# Patient Record
Sex: MALE | Race: WHITE | Employment: OTHER | URBAN - METROPOLITAN AREA
[De-identification: names, ages, dates, MRNs, and addresses within clinical notes are randomized per-mention and may not be internally consistent; named-entity substitution may affect disease eponyms.]

---

## 2017-03-06 ENCOUNTER — OFFICE VISIT (OUTPATIENT)
Dept: ORTHOPEDIC SURGERY | Age: 82
End: 2017-03-06

## 2017-03-06 DIAGNOSIS — M17.0 PRIMARY OSTEOARTHRITIS OF BOTH KNEES: Primary | ICD-10-CM

## 2017-03-06 PROCEDURE — 20611 DRAIN/INJ JOINT/BURSA W/US: CPT | Performed by: PHYSICIAN ASSISTANT

## 2017-05-09 PROBLEM — M00.062 STAPHYLOCOCCAL ARTHRITIS OF LEFT KNEE (HCC): Status: ACTIVE | Noted: 2017-05-09

## 2017-05-16 ENCOUNTER — TELEPHONE (OUTPATIENT)
Dept: ORTHOPEDIC SURGERY | Age: 82
End: 2017-05-16

## 2017-05-22 PROBLEM — M00.09 STAPHYLOCOCCAL ARTHRITIS OF MULTIPLE SITES (HCC): Status: ACTIVE | Noted: 2017-05-09

## 2017-05-22 PROBLEM — I80.8 THROMBOPHLEBITIS ARM: Status: ACTIVE | Noted: 2017-05-22

## 2017-05-31 ENCOUNTER — OFFICE VISIT (OUTPATIENT)
Dept: ORTHOPEDIC SURGERY | Age: 82
End: 2017-05-31

## 2017-05-31 DIAGNOSIS — M00.09 STAPHYLOCOCCAL ARTHRITIS OF MULTIPLE SITES (HCC): Primary | ICD-10-CM

## 2017-05-31 PROCEDURE — 99024 POSTOP FOLLOW-UP VISIT: CPT | Performed by: PHYSICIAN ASSISTANT

## 2017-09-08 ENCOUNTER — OFFICE VISIT (OUTPATIENT)
Dept: ORTHOPEDIC SURGERY | Age: 82
End: 2017-09-08

## 2017-09-08 DIAGNOSIS — M17.0 PRIMARY OSTEOARTHRITIS OF BOTH KNEES: Primary | ICD-10-CM

## 2017-09-08 PROCEDURE — 99213 OFFICE O/P EST LOW 20 MIN: CPT | Performed by: PHYSICIAN ASSISTANT

## 2019-01-08 ENCOUNTER — HOSPITAL ENCOUNTER (OUTPATIENT)
Dept: OPERATING ROOM | Age: 84
Setting detail: OUTPATIENT SURGERY
Discharge: HOME | End: 2019-01-08
Payer: MEDICARE

## 2019-01-08 VITALS — HEART RATE: 91 BPM | SYSTOLIC BLOOD PRESSURE: 177 MMHG | DIASTOLIC BLOOD PRESSURE: 73 MMHG

## 2019-01-08 PROCEDURE — 66821 AFTER CATARACT LASER SURGERY: CPT

## 2019-01-08 PROCEDURE — 6370000000 HC RX 637 (ALT 250 FOR IP): Performed by: OPHTHALMOLOGY

## 2019-01-08 RX ORDER — TROPICAMIDE 10 MG/ML
1 SOLUTION/ DROPS OPHTHALMIC
Status: COMPLETED | OUTPATIENT
Start: 2019-01-08 | End: 2019-01-08

## 2019-01-08 RX ORDER — PROPARACAINE HYDROCHLORIDE 5 MG/ML
1 SOLUTION/ DROPS OPHTHALMIC
Status: ACTIVE | OUTPATIENT
Start: 2019-01-08 | End: 2019-01-08

## 2019-01-08 RX ADMIN — TROPICAMIDE 1 DROP: 10 SOLUTION/ DROPS OPHTHALMIC at 07:01

## 2019-01-29 ENCOUNTER — HOSPITAL ENCOUNTER (OUTPATIENT)
Dept: OPERATING ROOM | Age: 84
Setting detail: OUTPATIENT SURGERY
Discharge: HOME | End: 2019-01-29
Payer: MEDICARE

## 2019-01-29 VITALS
SYSTOLIC BLOOD PRESSURE: 184 MMHG | HEART RATE: 53 BPM | OXYGEN SATURATION: 95 % | DIASTOLIC BLOOD PRESSURE: 71 MMHG | RESPIRATION RATE: 18 BRPM

## 2019-01-29 PROCEDURE — 66821 AFTER CATARACT LASER SURGERY: CPT

## 2019-01-29 PROCEDURE — 6370000000 HC RX 637 (ALT 250 FOR IP): Performed by: OPHTHALMOLOGY

## 2019-01-29 RX ORDER — PROPARACAINE HYDROCHLORIDE 5 MG/ML
1 SOLUTION/ DROPS OPHTHALMIC
Status: DISCONTINUED | OUTPATIENT
Start: 2019-01-29 | End: 2019-01-29

## 2019-01-29 RX ORDER — TROPICAMIDE 10 MG/ML
1 SOLUTION/ DROPS OPHTHALMIC
Status: DISCONTINUED | OUTPATIENT
Start: 2019-01-29 | End: 2019-01-29

## 2019-01-29 RX ADMIN — TROPICAMIDE 1 DROP: 10 SOLUTION/ DROPS OPHTHALMIC at 07:01

## 2021-03-22 ENCOUNTER — HOSPITAL ENCOUNTER (INPATIENT)
Age: 86
LOS: 2 days | Discharge: HOME | DRG: 391 | End: 2021-03-24
Attending: EMERGENCY MEDICINE | Admitting: INTERNAL MEDICINE
Payer: MEDICARE

## 2021-03-22 ENCOUNTER — APPOINTMENT (OUTPATIENT)
Dept: GENERAL RADIOLOGY | Age: 86
DRG: 391 | End: 2021-03-22
Payer: MEDICARE

## 2021-03-22 DIAGNOSIS — I49.3 PVC'S (PREMATURE VENTRICULAR CONTRACTIONS): ICD-10-CM

## 2021-03-22 DIAGNOSIS — R10.13 ABDOMINAL PAIN, EPIGASTRIC: ICD-10-CM

## 2021-03-22 DIAGNOSIS — R00.2 PALPITATIONS: Primary | ICD-10-CM

## 2021-03-22 LAB
A/G RATIO: 1.5 (ref 1.1–2.2)
ALBUMIN SERPL-MCNC: 4.1 G/DL (ref 3.4–5)
ALP BLD-CCNC: 52 U/L (ref 40–129)
ALT SERPL-CCNC: 29 U/L (ref 10–40)
ANION GAP SERPL CALCULATED.3IONS-SCNC: 12 MMOL/L (ref 3–16)
AST SERPL-CCNC: 33 U/L (ref 15–37)
BASOPHILS ABSOLUTE: 0 K/UL (ref 0–0.2)
BASOPHILS RELATIVE PERCENT: 0.6 %
BILIRUB SERPL-MCNC: 0.4 MG/DL (ref 0–1)
BUN BLDV-MCNC: 22 MG/DL (ref 7–20)
CALCIUM SERPL-MCNC: 9.4 MG/DL (ref 8.3–10.6)
CHLORIDE BLD-SCNC: 93 MMOL/L (ref 99–110)
CO2: 32 MMOL/L (ref 21–32)
CREAT SERPL-MCNC: 0.8 MG/DL (ref 0.8–1.3)
EKG ATRIAL RATE: 86 BPM
EKG DIAGNOSIS: NORMAL
EKG P AXIS: 23 DEGREES
EKG P-R INTERVAL: 172 MS
EKG Q-T INTERVAL: 418 MS
EKG QRS DURATION: 154 MS
EKG QTC CALCULATION (BAZETT): 500 MS
EKG R AXIS: -64 DEGREES
EKG T AXIS: 36 DEGREES
EKG VENTRICULAR RATE: 86 BPM
EOSINOPHILS ABSOLUTE: 0 K/UL (ref 0–0.6)
EOSINOPHILS RELATIVE PERCENT: 0.1 %
GFR AFRICAN AMERICAN: >60
GFR NON-AFRICAN AMERICAN: >60
GLOBULIN: 2.7 G/DL
GLUCOSE BLD-MCNC: 133 MG/DL (ref 70–99)
HCT VFR BLD CALC: 40 % (ref 40.5–52.5)
HEMOGLOBIN: 14.1 G/DL (ref 13.5–17.5)
LYMPHOCYTES ABSOLUTE: 0.6 K/UL (ref 1–5.1)
LYMPHOCYTES RELATIVE PERCENT: 9.6 %
MAGNESIUM: 1.9 MG/DL (ref 1.8–2.4)
MCH RBC QN AUTO: 32.8 PG (ref 26–34)
MCHC RBC AUTO-ENTMCNC: 35.4 G/DL (ref 31–36)
MCV RBC AUTO: 92.6 FL (ref 80–100)
MONOCYTES ABSOLUTE: 0.4 K/UL (ref 0–1.3)
MONOCYTES RELATIVE PERCENT: 6.1 %
NEUTROPHILS ABSOLUTE: 5.5 K/UL (ref 1.7–7.7)
NEUTROPHILS RELATIVE PERCENT: 83.6 %
PDW BLD-RTO: 14.2 % (ref 12.4–15.4)
PLATELET # BLD: 283 K/UL (ref 135–450)
PMV BLD AUTO: 7.3 FL (ref 5–10.5)
POTASSIUM SERPL-SCNC: 3.5 MMOL/L (ref 3.5–5.1)
RBC # BLD: 4.32 M/UL (ref 4.2–5.9)
SODIUM BLD-SCNC: 137 MMOL/L (ref 136–145)
TOTAL PROTEIN: 6.8 G/DL (ref 6.4–8.2)
TROPONIN: <0.01 NG/ML
WBC # BLD: 6.6 K/UL (ref 4–11)

## 2021-03-22 PROCEDURE — 84484 ASSAY OF TROPONIN QUANT: CPT

## 2021-03-22 PROCEDURE — 93005 ELECTROCARDIOGRAM TRACING: CPT | Performed by: EMERGENCY MEDICINE

## 2021-03-22 PROCEDURE — 80053 COMPREHEN METABOLIC PANEL: CPT

## 2021-03-22 PROCEDURE — 93010 ELECTROCARDIOGRAM REPORT: CPT | Performed by: INTERNAL MEDICINE

## 2021-03-22 PROCEDURE — 2060000000 HC ICU INTERMEDIATE R&B

## 2021-03-22 PROCEDURE — 96374 THER/PROPH/DIAG INJ IV PUSH: CPT

## 2021-03-22 PROCEDURE — 2500000003 HC RX 250 WO HCPCS: Performed by: EMERGENCY MEDICINE

## 2021-03-22 PROCEDURE — 83735 ASSAY OF MAGNESIUM: CPT

## 2021-03-22 PROCEDURE — 85025 COMPLETE CBC W/AUTO DIFF WBC: CPT

## 2021-03-22 PROCEDURE — 71045 X-RAY EXAM CHEST 1 VIEW: CPT

## 2021-03-22 PROCEDURE — 99285 EMERGENCY DEPT VISIT HI MDM: CPT

## 2021-03-22 RX ORDER — ASPIRIN 81 MG/1
81 TABLET, CHEWABLE ORAL DAILY
Status: DISCONTINUED | OUTPATIENT
Start: 2021-03-23 | End: 2021-03-24 | Stop reason: HOSPADM

## 2021-03-22 RX ORDER — ATORVASTATIN CALCIUM 10 MG/1
10 TABLET, FILM COATED ORAL DAILY
Status: DISCONTINUED | OUTPATIENT
Start: 2021-03-23 | End: 2021-03-24 | Stop reason: HOSPADM

## 2021-03-22 RX ORDER — ALBUTEROL SULFATE 90 UG/1
2 AEROSOL, METERED RESPIRATORY (INHALATION) EVERY 4 HOURS PRN
COMMUNITY
Start: 2021-03-18

## 2021-03-22 RX ORDER — NAPROXEN 500 MG/1
TABLET ORAL
Status: ON HOLD | COMMUNITY
Start: 2020-12-31 | End: 2021-04-06 | Stop reason: HOSPADM

## 2021-03-22 RX ORDER — PANTOPRAZOLE SODIUM 40 MG/1
40 TABLET, DELAYED RELEASE ORAL
Status: DISCONTINUED | OUTPATIENT
Start: 2021-03-23 | End: 2021-03-24 | Stop reason: HOSPADM

## 2021-03-22 RX ORDER — TAMSULOSIN HYDROCHLORIDE 0.4 MG/1
0.4 CAPSULE ORAL DAILY
Status: DISCONTINUED | OUTPATIENT
Start: 2021-03-23 | End: 2021-03-24 | Stop reason: HOSPADM

## 2021-03-22 RX ORDER — CEFUROXIME AXETIL 250 MG/1
TABLET ORAL
Status: ON HOLD | COMMUNITY
Start: 2021-03-18 | End: 2021-04-01 | Stop reason: HOSPADM

## 2021-03-22 RX ORDER — ESCITALOPRAM OXALATE 10 MG/1
5 TABLET ORAL DAILY
Status: DISCONTINUED | OUTPATIENT
Start: 2021-03-23 | End: 2021-03-23

## 2021-03-22 RX ORDER — POLYETHYLENE GLYCOL 3350 17 G/17G
17 POWDER, FOR SOLUTION ORAL DAILY PRN
Status: DISCONTINUED | OUTPATIENT
Start: 2021-03-22 | End: 2021-03-24 | Stop reason: HOSPADM

## 2021-03-22 RX ORDER — SUCRALFATE 1 G/1
1 TABLET ORAL
Status: DISCONTINUED | OUTPATIENT
Start: 2021-03-23 | End: 2021-03-24 | Stop reason: HOSPADM

## 2021-03-22 RX ORDER — ACETAMINOPHEN 650 MG/1
650 SUPPOSITORY RECTAL EVERY 6 HOURS PRN
Status: DISCONTINUED | OUTPATIENT
Start: 2021-03-22 | End: 2021-03-24 | Stop reason: HOSPADM

## 2021-03-22 RX ORDER — PROMETHAZINE HYDROCHLORIDE 25 MG/1
12.5 TABLET ORAL EVERY 6 HOURS PRN
Status: DISCONTINUED | OUTPATIENT
Start: 2021-03-22 | End: 2021-03-24 | Stop reason: HOSPADM

## 2021-03-22 RX ORDER — COLCHICINE 0.6 MG/1
TABLET ORAL
COMMUNITY
Start: 2021-02-03

## 2021-03-22 RX ORDER — PREDNISONE 10 MG/1
TABLET ORAL
COMMUNITY
Start: 2021-02-15

## 2021-03-22 RX ORDER — ONDANSETRON 2 MG/ML
4 INJECTION INTRAMUSCULAR; INTRAVENOUS EVERY 6 HOURS PRN
Status: DISCONTINUED | OUTPATIENT
Start: 2021-03-22 | End: 2021-03-22

## 2021-03-22 RX ORDER — NITROGLYCERIN 0.4 MG/1
0.4 TABLET SUBLINGUAL EVERY 5 MIN PRN
Status: DISCONTINUED | OUTPATIENT
Start: 2021-03-22 | End: 2021-03-24 | Stop reason: HOSPADM

## 2021-03-22 RX ORDER — ACETAMINOPHEN 325 MG/1
650 TABLET ORAL EVERY 6 HOURS PRN
Status: DISCONTINUED | OUTPATIENT
Start: 2021-03-22 | End: 2021-03-24 | Stop reason: HOSPADM

## 2021-03-22 RX ORDER — HYDROCHLOROTHIAZIDE 25 MG/1
25 TABLET ORAL DAILY
Status: ON HOLD | COMMUNITY
Start: 2021-01-22 | End: 2021-04-01 | Stop reason: HOSPADM

## 2021-03-22 RX ORDER — ASPIRIN 81 MG/1
81 TABLET ORAL DAILY
Status: DISCONTINUED | OUTPATIENT
Start: 2021-03-23 | End: 2021-03-22 | Stop reason: SDUPTHER

## 2021-03-22 RX ORDER — SODIUM CHLORIDE 0.9 % (FLUSH) 0.9 %
10 SYRINGE (ML) INJECTION EVERY 12 HOURS SCHEDULED
Status: DISCONTINUED | OUTPATIENT
Start: 2021-03-23 | End: 2021-03-24 | Stop reason: HOSPADM

## 2021-03-22 RX ORDER — SODIUM CHLORIDE 0.9 % (FLUSH) 0.9 %
10 SYRINGE (ML) INJECTION PRN
Status: DISCONTINUED | OUTPATIENT
Start: 2021-03-22 | End: 2021-03-24 | Stop reason: HOSPADM

## 2021-03-22 RX ORDER — CEFUROXIME AXETIL 250 MG/1
250 TABLET ORAL EVERY 12 HOURS SCHEDULED
Status: DISCONTINUED | OUTPATIENT
Start: 2021-03-23 | End: 2021-03-24 | Stop reason: HOSPADM

## 2021-03-22 RX ORDER — METOPROLOL TARTRATE 5 MG/5ML
2.5 INJECTION INTRAVENOUS ONCE
Status: COMPLETED | OUTPATIENT
Start: 2021-03-22 | End: 2021-03-22

## 2021-03-22 RX ORDER — TIMOLOL MALEATE 5 MG/ML
1 SOLUTION/ DROPS OPHTHALMIC 2 TIMES DAILY
Status: DISCONTINUED | OUTPATIENT
Start: 2021-03-23 | End: 2021-03-24 | Stop reason: HOSPADM

## 2021-03-22 RX ORDER — UMECLIDINIUM BROMIDE AND VILANTEROL TRIFENATATE 62.5; 25 UG/1; UG/1
1 POWDER RESPIRATORY (INHALATION) DAILY
COMMUNITY
Start: 2021-03-18

## 2021-03-22 RX ORDER — ACETAMINOPHEN 325 MG/1
650 TABLET ORAL EVERY 6 HOURS PRN
COMMUNITY

## 2021-03-22 RX ADMIN — METOPROLOL TARTRATE 2.5 MG: 5 INJECTION INTRAVENOUS at 16:53

## 2021-03-22 NOTE — H&P
Hospital Medicine History & Physical      PCP: Giovanna Dee MD    Date of Admission: 3/22/2021    Date of Service: Pt seen/examined on 3/22/2021 and Admitted to Inpatient with expected LOS greater than two midnights due to medical therapy. Chief Complaint: Palpitation and chest discomfort      History Of Present Illness:  )    80 y.o. male who presented to [facility] with above complaint. He was seen by primary care doctor 4 days ago for similar complaints. There was a suspicion of pneumonia and he was started on Ceftin. Still he has been taking this medication. He does have some cough no fever sputum change in mental status nausea vomiting diarrhea or any focal neurological symptoms. He gets a feeling of palpitation or fluttering on and off since last Thursday. This is not associated with dizziness or syncope. But he always has some chest discomfort and shortness of breath during these episodes. He denies any new medications recently    Past Medical History:          Diagnosis Date    Cancer [facility])     prostate  HAD SEED IMPLANTS    GERD (gastroesophageal reflux disease)     Glaucoma     History of stomach ulcers 1993+/-    Hyperlipidemia     Hypertension     Nausea alone     Pneumonia     1944    Psychiatric problem     anxiety    S/P radiation therapy 2007    Prostate seeds    Seasonal allergies     Staphylococcal arthritis of left knee ([facility] [address] 5/9/2017       Past Surgical History:          Procedure Laterality Date    APPENDECTOMY      CATARACT REMOVAL WITH IMPLANT Right 12/01/2016    CATARACT REMOVAL WITH IMPLANT Left 12/13/2016    CHOLECYSTECTOMY      COLONOSCOPY  10/20/1999    polyp    COLONOSCOPY  01/16/2002    ?     ENDOSCOPY, COLON, DIAGNOSTIC      EYE SURGERY      cataract implants bilat eyes    KNEE SURGERY Right 05/12/2017    video arthroscopy and I&D right knee, partial medial and lateral meniscectomy right knee    OTHER SURGICAL HISTORY  7/27/2012 Historical Provider, MD   predniSONE (DELTASONE) 10 MG tablet  2/15/21   Historical Provider, MD   cefUROXime (CEFTIN) 250 MG tablet  3/18/21   Historical Provider, MD   ketoconazole (NIZORAL) 2 % cream Apply topically daily. 5/23/17   Lani Rico MD   apixaban (ELIQUIS) 5 MG TABS tablet Take 5 mg by mouth 2 times daily    Historical Provider, MD   sodium chloride (OCEAN, BABY AYR) 0.65 % nasal spray 2 sprays by Nasal route as needed for Congestion (every  2 hours prn)    Historical Provider, MD   cetirizine (ZYRTEC) 10 MG tablet Take 1 tablet by mouth daily 5/16/17   Iman Brooks MD   sucralfate (CARAFATE) 1 GM tablet Take 1 tablet by mouth 4 times daily (before meals and nightly). 4/20/13   Claribel Wang MD   promethazine (PHENERGAN) 25 MG tablet Take 0.5-1 tablets by mouth every 6 hours as needed for Nausea. 4/20/13   Claribel Wang MD   escitalopram (LEXAPRO) 5 MG tablet Take 5 mg by mouth daily. Historical Provider, MD   Azelastine HCl (ASTEPRO) 0.15 % SOLN by Nasal route as needed. Historical Provider, MD       Allergies:  Neomycin-polymyxin-gramicidin and Neosporin [bacitracin-neomycin-polymyxin]    Social History:      The patient currently lives at home pretty independent    TOBACCO:   reports that he has been smoking cigars. He has smoked for the past 38.00 years. He has never used smokeless tobacco.  ETOH:   reports no history of alcohol use. E-Cigarettes/Vaping Use     Questions Responses    E-Cigarette/Vaping Use     Start Date     Passive Exposure     Quit Date     Counseling Given     Comments             Family History:       Reviewed in detail and negative for DM, CAD, Cancer, CVA. Positive as follows:        Problem Relation Age of Onset   Mirtaen Daunt Cancer Daughter         ovarian    Stroke Mother     High Blood Pressure Father     Other Father         circulation issues       REVIEW OF SYSTEMS:   Pertinent positives as noted in the HPI.  All other systems reviewed and Result   Stable chronic changes with no acute abnormality seen             ASSESSMENT:    Active Hospital Problems    Diagnosis Date Noted    Palpitation [R00.2] 03/22/2021         PLAN:    Palpitation/chest discomfort and shortness of breath-episodic-admit, telemetry, troponin, echocardiogram, cardiology evaluation    Hypertension- home medication ,not quite sure about the diuretics that he is medication list says he is on hydrochlorothiazide and Lasix-hold and verify    Hyperlipidemia-statin    On Eliquis? Indication    GERD-Protonix and Carafate    Possible bronchitis or pneumonia-started on Ceftin as an outpatient, continue 3 more days and stop    Cardiac murmur-echocardiogram pending        DVT Prophylaxis: On Eliquis  Diet: Cardiac  Code Status: Full code    PT/OT Eval Status:     Dispo -admitted to Yisel Wilkerson MD    Thank you Kuldip Foreman MD for the opportunity to be involved in this patient's care. If you have any questions or concerns please feel free to contact me at [phone].

## 2021-03-23 LAB
CHOLESTEROL, TOTAL: 161 MG/DL (ref 0–199)
HCT VFR BLD CALC: 38.5 % (ref 40.5–52.5)
HDLC SERPL-MCNC: 64 MG/DL (ref 40–60)
HEMOGLOBIN: 13.5 G/DL (ref 13.5–17.5)
LDL CHOLESTEROL CALCULATED: 79 MG/DL
MCH RBC QN AUTO: 32.8 PG (ref 26–34)
MCHC RBC AUTO-ENTMCNC: 35 G/DL (ref 31–36)
MCV RBC AUTO: 93.7 FL (ref 80–100)
PDW BLD-RTO: 13.9 % (ref 12.4–15.4)
PLATELET # BLD: 231 K/UL (ref 135–450)
PMV BLD AUTO: 7.1 FL (ref 5–10.5)
RBC # BLD: 4.11 M/UL (ref 4.2–5.9)
TRIGL SERPL-MCNC: 89 MG/DL (ref 0–150)
TROPONIN: <0.01 NG/ML
TSH REFLEX FT4: 0.95 UIU/ML (ref 0.27–4.2)
VLDLC SERPL CALC-MCNC: 18 MG/DL
WBC # BLD: 7.9 K/UL (ref 4–11)

## 2021-03-23 PROCEDURE — 6370000000 HC RX 637 (ALT 250 FOR IP): Performed by: INTERNAL MEDICINE

## 2021-03-23 PROCEDURE — 84484 ASSAY OF TROPONIN QUANT: CPT

## 2021-03-23 PROCEDURE — 94640 AIRWAY INHALATION TREATMENT: CPT

## 2021-03-23 PROCEDURE — 6370000000 HC RX 637 (ALT 250 FOR IP)

## 2021-03-23 PROCEDURE — 85027 COMPLETE CBC AUTOMATED: CPT

## 2021-03-23 PROCEDURE — 99221 1ST HOSP IP/OBS SF/LOW 40: CPT | Performed by: INTERNAL MEDICINE

## 2021-03-23 PROCEDURE — 80061 LIPID PANEL: CPT

## 2021-03-23 PROCEDURE — 84443 ASSAY THYROID STIM HORMONE: CPT

## 2021-03-23 PROCEDURE — 2060000000 HC ICU INTERMEDIATE R&B

## 2021-03-23 PROCEDURE — 2580000003 HC RX 258: Performed by: INTERNAL MEDICINE

## 2021-03-23 RX ORDER — MAGNESIUM SULFATE 1 G/100ML
1000 INJECTION INTRAVENOUS ONCE
Status: COMPLETED | OUTPATIENT
Start: 2021-03-23 | End: 2021-03-23

## 2021-03-23 RX ORDER — PREDNISONE 1 MG/1
5 TABLET ORAL DAILY
Status: DISCONTINUED | OUTPATIENT
Start: 2021-03-23 | End: 2021-03-23

## 2021-03-23 RX ORDER — PREDNISONE 10 MG/1
10 TABLET ORAL DAILY
Status: DISCONTINUED | OUTPATIENT
Start: 2021-03-23 | End: 2021-03-24 | Stop reason: HOSPADM

## 2021-03-23 RX ORDER — POTASSIUM CHLORIDE 20 MEQ/1
40 TABLET, EXTENDED RELEASE ORAL ONCE
Status: COMPLETED | OUTPATIENT
Start: 2021-03-23 | End: 2021-03-23

## 2021-03-23 RX ADMIN — SUCRALFATE 1 G: 1 TABLET ORAL at 05:03

## 2021-03-23 RX ADMIN — PANTOPRAZOLE SODIUM 40 MG: 40 TABLET, DELAYED RELEASE ORAL at 09:53

## 2021-03-23 RX ADMIN — MAGNESIUM GLUCONATE 500 MG ORAL TABLET 400 MG: 500 TABLET ORAL at 15:21

## 2021-03-23 RX ADMIN — ATORVASTATIN CALCIUM 10 MG: 10 TABLET, FILM COATED ORAL at 09:52

## 2021-03-23 RX ADMIN — POTASSIUM CHLORIDE 40 MEQ: 1500 TABLET, EXTENDED RELEASE ORAL at 15:20

## 2021-03-23 RX ADMIN — PREDNISONE 10 MG: 10 TABLET ORAL at 15:20

## 2021-03-23 RX ADMIN — MAGNESIUM SULFATE 1000 MG: 1 INJECTION INTRAVENOUS at 15:20

## 2021-03-23 RX ADMIN — SUCRALFATE 1 G: 1 TABLET ORAL at 17:56

## 2021-03-23 RX ADMIN — SUCRALFATE 1 G: 1 TABLET ORAL at 11:11

## 2021-03-23 RX ADMIN — SODIUM CHLORIDE, PRESERVATIVE FREE 10 ML: 5 INJECTION INTRAVENOUS at 09:53

## 2021-03-23 RX ADMIN — TIMOLOL MALEATE 1 DROP: 5 SOLUTION/ DROPS OPHTHALMIC at 09:53

## 2021-03-23 RX ADMIN — TIMOLOL MALEATE 1 DROP: 5 SOLUTION/ DROPS OPHTHALMIC at 20:46

## 2021-03-23 RX ADMIN — SODIUM CHLORIDE, PRESERVATIVE FREE 10 ML: 5 INJECTION INTRAVENOUS at 20:46

## 2021-03-23 RX ADMIN — METOPROLOL TARTRATE 25 MG: 25 TABLET, FILM COATED ORAL at 20:46

## 2021-03-23 RX ADMIN — SUCRALFATE 1 G: 1 TABLET ORAL at 20:46

## 2021-03-23 RX ADMIN — TAMSULOSIN HYDROCHLORIDE 0.4 MG: 0.4 CAPSULE ORAL at 09:53

## 2021-03-23 RX ADMIN — CEFUROXIME AXETIL 250 MG: 250 TABLET ORAL at 09:52

## 2021-03-23 RX ADMIN — GLYCOPYRROLATE AND FORMOTEROL FUMARATE 2 PUFF: 9; 4.8 AEROSOL, METERED RESPIRATORY (INHALATION) at 08:19

## 2021-03-23 RX ADMIN — GLYCOPYRROLATE AND FORMOTEROL FUMARATE 2 PUFF: 9; 4.8 AEROSOL, METERED RESPIRATORY (INHALATION) at 19:52

## 2021-03-23 RX ADMIN — CEFUROXIME AXETIL 250 MG: 250 TABLET ORAL at 20:46

## 2021-03-23 RX ADMIN — Medication: at 18:11

## 2021-03-23 RX ADMIN — ASPIRIN 81 MG 81 MG: 81 TABLET ORAL at 09:52

## 2021-03-23 ASSESSMENT — PAIN SCALES - GENERAL
PAINLEVEL_OUTOF10: 0
PAINLEVEL_OUTOF10: 0

## 2021-03-23 NOTE — FLOWSHEET NOTE
03/23/21 0224   Assessment   Charting Type Admission   Neurological   Neuro (WDL) WDL   Level of Consciousness Alert (0)   Orientation Level Oriented X4;Oriented to place;Oriented to time;Oriented to situation;Oriented to person   Cognition Appropriate judgement; Appropriate for developmental age; Follows commands; Appropriate safety awareness; Appropriate attention/concentration   Language Clear   Monarch Coma Scale   Eye Opening 4   Best Verbal Response 5   Best Motor Response 6   Dora Coma Scale Score 15   HEENT   HEENT (WDL) X   Right Eye Impaired vision   Left Eye Impaired vision   Right Ear Impaired hearing   Left Ear Impaired hearing   Respiratory   Respiratory (WDL) WDL   Respiratory Pattern Regular   Respiratory Depth Normal   Respiratory Quality/Effort Unlabored   Chest Assessment Chest expansion symmetrical;Trachea midline   L Breath Sounds Diminished   R Breath Sounds Diminished   Cardiac   Cardiac (WDL) X   Cardiac Regularity Regular   Heart Sounds Murmur   Cardiac Rhythm NSR;PVC   Cardiac Monitor   Telemetry Monitor On Yes   Telemetry Audible Yes   Telemetry Alarms Set Yes   Gastrointestinal   Abdominal (WDL) WDL   Peripheral Vascular   Peripheral Vascular (WDL) WDL   Edema None   Skin Color/Condition   Skin Color/Condition (WDL) WDL   Skin Integrity   Skin Integrity (WDL) WDL   Musculoskeletal   Musculoskeletal (WDL) WDL   Genitourinary   Genitourinary (WDL) WDL   Flank Tenderness No   Suprapubic Tenderness No   Dysuria No   Anus/Rectum   Anus/Rectum (WDL) WDL   Psychosocial   Psychosocial (WDL) WDL

## 2021-03-23 NOTE — PROGRESS NOTES
Patient admitted to room 446 from ed. Patient oriented to room, call light, bed rails, phone, lights and bathroom. Patient instructed about the schedule of the day including: vital sign frequency, lab draws, possible tests, frequency of MD and staff rounds, including RN/MD rounding together at bedside, daily weights, and I &O's. Patient instructed about prescribed diet, how to use 8MENU, and television. bed alarm in place, patient aware of placement and reason. Telemetry box  in place, patient aware of placement and reason. Bed locked, in lowest position, side rails up 2/4, call light within reach. Will continue to monitor.

## 2021-03-23 NOTE — PROGRESS NOTES
edema bilaterally. Full range of motion without deformity. Skin: Skin color, texture, turgor normal.  No rashes or lesions. Neurologic:  Neurovascularly intact without any focal sensory/motor deficits. Cranial nerves: II-XII intact, grossly non-focal.  Psychiatric: Alert and oriented, thought content appropriate, normal insight  Capillary Refill: Brisk,< 3 seconds   Peripheral Pulses: +2 palpable, equal bilaterally       Labs:   Recent Labs     03/22/21  1555 03/23/21  0319   WBC 6.6 7.9   HGB 14.1 13.5   HCT 40.0* 38.5*    231     Recent Labs     03/22/21  1555      K 3.5   CL 93*   CO2 32   BUN 22*   CREATININE 0.8   CALCIUM 9.4     Recent Labs     03/22/21  1555   AST 33   ALT 29   BILITOT 0.4   ALKPHOS 52     No results for input(s): INR in the last 72 hours. Recent Labs     03/22/21  1555 03/23/21  0319   TROPONINI <0.01 <0.01       Urinalysis:      Lab Results   Component Value Date    NITRU Negative 05/14/2017    BLOODU Negative 05/14/2017    SPECGRAV 1.010 05/14/2017    GLUCOSEU Negative 05/14/2017       Radiology:  XR CHEST PORTABLE   Final Result   Stable chronic changes with no acute abnormality seen                 Assessment/Plan:    Active Hospital Problems    Diagnosis    Palpitation [R00.2]       \"80 y.o. male who presented to [facility] with palpitations and chest discomfort. He was seen by primary care doctor 4 days ago for similar complaints. There was a suspicion of pneumonia and he was started on Ceftin. He gets a feeling of palpitation or fluttering on and off since last Thursday. This is not associated with dizziness or syncope. But he always has some chest discomfort and shortness of breath during these episodes. \"      Palpitations. Likely due to PVCs, with a trigeminy pattern. No other significant events on tele. TSH and electrolytes OK, no offending medications. F/u TTE. Chest discomfort. Troponin and EKG reassuring. TTE as above.   Appreciate cardiology input.  - He tells me that his discomfort felt like a \"nausea in his chest.\"  Perhaps GI related. Conservative management with PPI. Cardiac murmur. TTE as above. Dyspnea. This was mild and seemed to improve spontaneously. Saturated well on RA.  CHF, PE, and a number of other acute etiologies seem unlikely. He can finish the course of cefuroxime from his PCP for pneumonia. He does not take apixaban. 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 He ended up getting admitted shortly thereafter, and the hospitalist, Dr. Ibeth Jarrett, recommended that he continue apixaban for another 10-14 days at most.  His PCP notes don't mention apixaban and his daughter confirmed that he does not take this medication. I am not sure why he takes prednisone 10 mg po qd, but nursing confirmed this with the daughter. Prednisone does appear on his PCP's med list, but the situation is still unclear. Continue prednisone for now and f/u with PCP for clarification. DVT Prophylaxis: enoxaparin  Diet: DIET GENERAL;  Code Status: Full Code    PT/OT Eval Status: not indicated    Dispo - potentially 3/23-24, pending TTE and cardiology input. He lives at home.       Gopi Duque MD

## 2021-03-23 NOTE — PROGRESS NOTES
4 Eyes Skin Assessment     NAME:  Andreas Stoll  YOB: 1925  MEDICAL RECORD NUMBER:  [id number]    The patient is being assess for  Admission    I agree that 2 RN's have performed a thorough Head to Toe Skin Assessment on the patient. ALL assessment sites listed below have been assessed. Areas assessed by both nurses:    Head, Face, Ears, Shoulders, Back, Chest, Arms, Elbows, Hands, Sacrum. Buttock, Coccyx, Ischium and Legs. Feet and Heels        Does the Patient have a Wound?  No noted wound(s)       Louie Prevention initiated:  No   Wound Care Orders initiated:  No    Pressure Injury (Stage 3,4, Unstageable, DTI, NWPT, and Complex wounds) if present place consult order under [de-identified] No    New and Established Ostomies if present place consult order under : No      Nurse 1 eSignature: Electronically signed by Jeronimo Cuadra RN on 3/23/21 at 2:29 AM EDT    **SHARE this note so that the co-signing nurse is able to place an eSignature**    Nurse 2 eSignature: {Esignature:[id number]}

## 2021-03-23 NOTE — CARE COORDINATION
CASE MANAGEMENT INITIAL ASSESSMENT      Reviewed chart and completed assessment with: patient and daugther  Explained Case Management role/services. Health Care Decision Maker :   Primary Decision Maker: Coco Mari - [phone]          Can this person be reached and be able to respond quickly, such as within a few minutes or hours? Yes      Admit date/status: 3/22/21 Inpatient   Diagnosis: palpitations    Is this a Readmission?:  No      [facility] Yes       3 night stay required: No    Living arrangements, Adls, care needs, prior to admission: lives alone in a one level house     Transportation: patient      [facility] at home:  Walker__Cane_X_RTS_X_ BSC__Shower Chair_X_  02__ HHN__ CPAP__  BiPap__  Hospital Bed__ W/C__X_ Other_grab bars, Lorenzo lift _________    Services in the home and/or outpatient, prior to admission: private duty for cooking and cleaning and companionship    Dialysis Facility (if applicable)   · Name:  · Address:  · Dialysis Schedule:  · Phone:  · Fax:    PT/OT recs: none    Hospital Exemption Notification (HEN): not initiated     Barriers to discharge: none    Plan/comments: Patient is independent at home. His wife recently  so the private duty retired nurse is mostly there for companionship. His daughter states she gets over there to see him as much as possible. They don't anticipate any needs at this time. Please notify CM should any needs arise.

## 2021-03-23 NOTE — CONSULTS
[facility]  [phone]      Attending Physician: Bridget Stone MD  Reason for Consultation/Chief Complaint: palpitations    Subjective   History of Present Illness:  Dean Stoll is a 80 y.o. patient who presented to the hospital with complaints of palpitations, states SOb for a few days woke from sleep and could not sleep. Felt like was smothered. No CP. Did not feel anything palpitations, no racing heart. SOB lasted short term then stopped and off and on. Not worse with excertion, no orthopnea, PND or edema. No syncope. Can't eat, \"feel full\" has not gotten laxative. No syncope or dizziness. Past Medical History:   has a past medical history of Cancer ([facility] [address] GERD (gastroesophageal reflux disease), Glaucoma, History of stomach ulcers, Hyperlipidemia, Hypertension, Nausea alone, Pneumonia, Psychiatric problem, S/P radiation therapy, Seasonal allergies, and Staphylococcal arthritis of left knee ([facility] [address] Surgical History:   has a past surgical history that includes Appendectomy; Cholecystectomy; skin biopsy; other surgical history (7/27/2012); Colonoscopy (10/20/1999); Colonoscopy (01/16/2002); Upper gastrointestinal endoscopy (04/30/2004); Upper gastrointestinal endoscopy (12/12/2007); Prostate biopsy (2007); Upper gastrointestinal endoscopy (4/19/2013); Cataract removal with implant (Right, 12/01/2016); Cataract removal with implant (Left, 12/13/2016); other surgical history (Left, 05/09/2017); Wrist surgery (Right, 05/12/2017); knee surgery (Right, 05/12/2017); eye surgery; and Endoscopy, colon, diagnostic. Social History:   reports that he has been smoking cigars. He has smoked for the past 38.00 years. He has never used smokeless tobacco. He reports that he does not drink alcohol or use drugs. Family History:  family history includes Cancer in his daughter; High Blood Pressure in his father; Other in his father; Stroke in his mother.       Home Medications:  Were reviewed and are listed in nursing record and/or below  Prior to Admission medications    Medication Sig Start Date End Date Taking? Authorizing Provider   hydroCHLOROthiazide (HYDRODIURIL) 25 MG tablet Take 25 mg by mouth daily 1/22/21  Yes Historical Provider, MD   naproxen (NAPROSYN) 500 MG tablet TAKE 1 TABLET TWICE A DAY  (WITH MEALS) 12/31/20  Yes Historical Provider, MD   albuterol sulfate HFA (PROAIR HFA) 108 (90 Base) MCG/ACT inhaler Inhale 2 puffs into the lungs every 4 hours as needed 3/18/21  Yes Historical Provider, MD   acetaminophen (TYLENOL) 325 MG tablet Take 650 mg by mouth every 6 hours as needed for Pain   Yes Historical Provider, MD   umeclidinium-vilanterol (ANORO ELLIPTA) 62.5-25 MCG/INH AEPB inhaler Inhale 1 puff into the lungs daily 3/18/21  Yes Historical Provider, MD   sennosides-docusate sodium (SENOKOT-S) 8.6-50 MG tablet Take 2 tablets by mouth daily as needed for Constipation 5/16/17  Yes Tammie Hoyos MD   Furosemide (LASIX PO) Take 40 mg by mouth    Yes Historical Provider, MD   aspirin 81 MG EC tablet Take 81 mg by mouth daily. Yes Historical Provider, MD   simvastatin (ZOCOR) 10 MG tablet Take 10 mg by mouth nightly. Yes Historical Provider, MD   tamsulosin (FLOMAX) 0.4 MG capsule Take 0.4 mg by mouth daily. Yes Historical Provider, MD   timolol (TIMOPTIC) 0.5 % ophthalmic solution Place 1 drop into both eyes 2 times daily    Yes Historical Provider, MD   colchicine (COLCRYS) 0.6 MG tablet  2/3/21   Historical Provider, MD   predniSONE (DELTASONE) 10 MG tablet  2/15/21   Historical Provider, MD   cefUROXime (CEFTIN) 250 MG tablet  3/18/21   Historical Provider, MD   ketoconazole (NIZORAL) 2 % cream Apply topically daily.  5/23/17   Derrick Bajwa MD   sodium chloride (OCEAN, BABY AYR) 0.65 % nasal spray 2 sprays by Nasal route as needed for Congestion (every  2 hours prn)    Historical Provider, MD   cetirizine (ZYRTEC) 10 MG tablet Take 1 tablet by mouth daily 5/16/17   Eliana Rebolledo MD   sucralfate (CARAFATE) 1 GM tablet Take 1 tablet by mouth 4 times daily (before meals and nightly). 4/20/13   Lady Negron MD   promethazine (PHENERGAN) 25 MG tablet Take 0.5-1 tablets by mouth every 6 hours as needed for Nausea. 4/20/13   Lady Negron MD   Azelastine HCl (ASTEPRO) 0.15 % SOLN by Nasal route as needed. Historical Provider, MD        CURRENT Medications:  glycopyrrolate-formoterol (BEVESPI) 9-4.8 MCG/ACT inhaler 2 puff, BID  apixaban (ELIQUIS) tablet 5 mg, BID  cefUROXime (CEFTIN) tablet 250 mg, 2 times per day  escitalopram (LEXAPRO) tablet 5 mg, Daily  atorvastatin (LIPITOR) tablet 10 mg, Daily  sucralfate (CARAFATE) tablet 1 g, 4x Daily AC & HS  tamsulosin (FLOMAX) capsule 0.4 mg, Daily  timolol (TIMOPTIC) 0.5 % ophthalmic solution 1 drop, BID  sodium chloride flush 0.9 % injection 10 mL, 2 times per day  sodium chloride flush 0.9 % injection 10 mL, PRN  promethazine (PHENERGAN) tablet 12.5 mg, Q6H PRN  acetaminophen (TYLENOL) tablet 650 mg, Q6H PRN    Or  acetaminophen (TYLENOL) suppository 650 mg, Q6H PRN  polyethylene glycol (GLYCOLAX) packet 17 g, Daily PRN  aspirin chewable tablet 81 mg, Daily  nitroGLYCERIN (NITROSTAT) SL tablet 0.4 mg, Q5 Min PRN  perflutren lipid microspheres (DEFINITY) injection 1.65 mg, ONCE PRN  pantoprazole (PROTONIX) tablet 40 mg, QAM AC        Allergies:  Neomycin-polymyxin-gramicidin and Neosporin [bacitracin-neomycin-polymyxin]     Review of Systems:   A 14 point review of symptoms completed. Pertinent positives identified in the HPI, all other review of symptoms negative as below.       Objective   PHYSICAL EXAM:    Vitals:    03/23/21 0821   BP:    Pulse:    Resp:    Temp:    SpO2: 97%              General Appearance:  Alert, cooperative, no distress, appears stated age   Head:  Normocephalic, without obvious abnormality, atraumatic   Eyes:  PERRL, conjunctiva/corneas clear   Nose: Nares normal, no drainage or sinus tenderness   Throat: Lips, mucosa, and tongue normal   Neck: Supple, symmetrical, trachea midline, no adenopathy, thyroid: not enlarged, symmetric, no tenderness/mass/nodules, no carotid bruit or JVD   Lungs:   Clear to auscultation bilaterally, respirations unlabored   Chest Wall:  No deformity or tenderness   Heart:  Regular rate and rhythm, S1, S2 normal, no murmur, rub or gallop   Abdomen:   Soft, non-tender, bowel sounds active all four quadrants,  no masses, no organomegaly   Extremities: Extremities normal, atraumatic, no cyanosis or edema   Pulses: 2+ and symmetric   Skin: Skin color, texture, turgor normal, no rashes or lesions   Pysch: Normal mood and affect   Neurologic: Normal gross motor and sensory exam.         Labs   CBC:   Lab Results   Component Value Date    WBC 7.9 03/23/2021    RBC 4.11 03/23/2021    HGB 13.5 03/23/2021    HCT 38.5 03/23/2021    MCV 93.7 03/23/2021    RDW 13.9 03/23/2021     03/23/2021     CMP:  Lab Results   Component Value Date     03/22/2021    K 3.5 03/22/2021    CL 93 03/22/2021    CO2 32 03/22/2021    BUN 22 03/22/2021    CREATININE 0.8 03/22/2021    GFRAA >60 03/22/2021    GFRAA >60 04/19/2013    AGRATIO 1.5 03/22/2021    LABGLOM >60 03/22/2021    GLUCOSE 133 03/22/2021    PROT 6.8 03/22/2021    PROT 7.1 09/24/2012    CALCIUM 9.4 03/22/2021    BILITOT 0.4 03/22/2021    ALKPHOS 52 03/22/2021    AST 33 03/22/2021    ALT 29 03/22/2021     PT/INR:  No results found for: PTINR  HgBA1c:No results found for: LABA1C  Lab Results   Component Value Date    CKMB 1.72 03/31/2013    TROPONINI <0.01 03/23/2021         Cardiac Data     Last EKG:   3/22/2021 NSR with RBBB, LAD, PRWP, LVH by voltage    TEle: frequent PVCS oftenin trigeminy pattern    Echo: 5/2017   Summary   No valvular endocarditis seen. Left ventricular systolic function is normal with ejection fraction   estimated at 55 %.     Stress Test:    Cath:    Studies:     Assessment and Plan      1. Irregular HR  2. PVC: frequent, Trigeminy  3. SOB  4. HTN      Plan  1. ECHO  2. ? Why pt on Eliquis  3. Start Mag Oxide and lopressor  4. Replete K to >4 and mag >2        Patient Active Problem List   Diagnosis    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 [address]    Near syncope    Bradycardia    Nausea    Weight loss    EKG abnormalities    HTN (hypertension)    Hiatal hernia    PUD (peptic ulcer disease)    Localized osteoarthrosis, lower leg    Knee pain, bilateral    Primary osteoarthritis of both knees    Anterior tibialis tendinitis    Staphylococcal arthritis of multiple sites ([facility] [address]    Swelling of knee joint    Acute arthritis    Crystal arthropathy    Calcium pyrophosphate arthropathy of multiple sites    Cellulitis of left lower extremity    Thrombophlebitis arm    Left arm cellulitis    Palpitation           Thank you for allowing us to participate in the care of Andreas Stoll. Please call me with any questions [phone]. Pravin Meza MD, [facility] Cardiologist  [address]  [phone] [facility]  [phone] [address] [place] [address]  3/23/2021 10:47 AM    I will address the patient's cardiac risk factors and adjusted pharmacologic treatment as needed. In addition, I have reinforced the need for patient directed risk factor modification. All questions and concerns were addressed to the patient/family. Alternatives to my treatment were discussed. The note was completed using EMR. Every effort was made to ensure accuracy; however, inadvertent computerized transcription errors may be present.

## 2021-03-24 VITALS
TEMPERATURE: 95.5 F | BODY MASS INDEX: 29.4 KG/M2 | OXYGEN SATURATION: 94 % | RESPIRATION RATE: 16 BRPM | WEIGHT: 194 LBS | HEIGHT: 68 IN | SYSTOLIC BLOOD PRESSURE: 147 MMHG | DIASTOLIC BLOOD PRESSURE: 72 MMHG | HEART RATE: 59 BPM

## 2021-03-24 LAB
LV EF: 58 %
LVEF MODALITY: NORMAL

## 2021-03-24 PROCEDURE — C8929 TTE W OR WO FOL WCON,DOPPLER: HCPCS

## 2021-03-24 PROCEDURE — 6360000002 HC RX W HCPCS: Performed by: INTERNAL MEDICINE

## 2021-03-24 PROCEDURE — 6370000000 HC RX 637 (ALT 250 FOR IP): Performed by: INTERNAL MEDICINE

## 2021-03-24 PROCEDURE — 99232 SBSQ HOSP IP/OBS MODERATE 35: CPT | Performed by: NURSE PRACTITIONER

## 2021-03-24 PROCEDURE — 2580000003 HC RX 258: Performed by: INTERNAL MEDICINE

## 2021-03-24 PROCEDURE — 94640 AIRWAY INHALATION TREATMENT: CPT

## 2021-03-24 RX ADMIN — SODIUM CHLORIDE, PRESERVATIVE FREE 10 ML: 5 INJECTION INTRAVENOUS at 08:43

## 2021-03-24 RX ADMIN — SUCRALFATE 1 G: 1 TABLET ORAL at 11:17

## 2021-03-24 RX ADMIN — PANTOPRAZOLE SODIUM 40 MG: 40 TABLET, DELAYED RELEASE ORAL at 08:44

## 2021-03-24 RX ADMIN — GLYCOPYRROLATE AND FORMOTEROL FUMARATE 2 PUFF: 9; 4.8 AEROSOL, METERED RESPIRATORY (INHALATION) at 08:50

## 2021-03-24 RX ADMIN — SUCRALFATE 1 G: 1 TABLET ORAL at 05:01

## 2021-03-24 RX ADMIN — TAMSULOSIN HYDROCHLORIDE 0.4 MG: 0.4 CAPSULE ORAL at 08:44

## 2021-03-24 RX ADMIN — TIMOLOL MALEATE 1 DROP: 5 SOLUTION/ DROPS OPHTHALMIC at 08:44

## 2021-03-24 RX ADMIN — CEFUROXIME AXETIL 250 MG: 250 TABLET ORAL at 08:44

## 2021-03-24 RX ADMIN — MAGNESIUM GLUCONATE 500 MG ORAL TABLET 400 MG: 500 TABLET ORAL at 08:44

## 2021-03-24 RX ADMIN — ENOXAPARIN SODIUM 40 MG: 40 INJECTION SUBCUTANEOUS at 08:43

## 2021-03-24 RX ADMIN — ATORVASTATIN CALCIUM 10 MG: 10 TABLET, FILM COATED ORAL at 08:44

## 2021-03-24 RX ADMIN — PREDNISONE 10 MG: 10 TABLET ORAL at 08:44

## 2021-03-24 RX ADMIN — ASPIRIN 81 MG 81 MG: 81 TABLET ORAL at 08:44

## 2021-03-24 ASSESSMENT — ENCOUNTER SYMPTOMS
GASTROINTESTINAL NEGATIVE: 1
RESPIRATORY NEGATIVE: 1

## 2021-03-24 ASSESSMENT — PAIN SCALES - GENERAL
PAINLEVEL_OUTOF10: 0
PAINLEVEL_OUTOF10: 0

## 2021-03-24 NOTE — DISCHARGE SUMMARY
Hospital Medicine Discharge Summary    Patient ID: Mariana Valles      Patient's PCP: Aroldo Dietz MD    Admit Date: 3/22/2021     Discharge Date:   03/24/21     Admitting Physician: Vj Martinez MD     Discharge Physician: Suann Baumgarten, MD     Discharge Diagnoses: Active Hospital Problems    Diagnosis    PVC's (premature ventricular contractions) [I49.3]    SOB (shortness of breath) [R06.02]    Palpitations [R00.2]       The patient was seen and examined on day of discharge and this discharge summary is in conjunction with any daily progress note from day of discharge. Hospital Course:  \"80 y. o. male who presented to [facility] with palpitations and chest discomfort.  He was seen by primary care doctor 4 days ago for similar complaints. Vernell Files was a suspicion of pneumonia and he was started on Ceftin. He gets a feeling of palpitation or fluttering on and off since last Thursday.  This is not associated with dizziness or syncope.  But he always has some chest discomfort and shortness of breath during these episodes. \"        Palpitations. Likely due to PVCs, with a trigeminy pattern. No other significant events on tele. TSH and electrolytes OK, no offending medications. TTE is pending at the time of this note. Cardiology started low-dose metoprolol, which he tolerated with HR's in the high 50's.      Chest discomfort. Troponin and EKG reassuring. TTE as above. Appreciate cardiology input.  - He tells me that his discomfort felt like a \"nausea in his chest.\"  Perhaps GI related. Conservative management with PPI.     Cardiac murmur. TTE as above.     Dyspnea. This was mild and seemed to improve spontaneously. Saturated well on RA.  CHF, PE, and a number of other acute etiologies seem unlikely. He can finish the course of cefuroxime from his PCP for pneumonia.     He does not take apixaban.   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 He ended up getting admitted shortly thereafter, and the hospitalist, Dr. Max Trent, recommended that he continue apixaban for another 10-14 days at most.  His PCP notes don't mention apixaban and his daughter confirmed that he does not take this medication.      I am not sure why he takes prednisone 10 mg po qd, but nursing confirmed this with the daughter. Prednisone does appear on his PCP's med list, but the situation is still unclear. Continue prednisone for now and f/u with PCP for clarification. Physical Exam Performed:     BP (!) 146/68   Pulse 55   Temp 97.3 °F (36.3 °C) (Oral)   Resp 17   Ht 5' 8\" (1.727 m)   Wt 194 lb 0.1 oz (88 kg)   SpO2 94%   BMI 29.50 kg/m²       General appearance: No apparent distress, appears stated age and cooperative. HEENT: Pupils equal, round, and reactive to light. Conjunctivae/corneas clear. Neck: Supple, with full range of motion. No jugular venous distention. Trachea midline. Respiratory:  Normal respiratory effort. Clear to auscultation, bilaterally without Rales/Wheezes/Rhonchi. Cardiovascular: Regular rate and rhythm with normal S1/S2 without rubs or gallops. 3/6 systolic murmur at the LUSB. Abdomen: Soft, non-tender, non-distended with normal bowel sounds. Musculoskeletal: No clubbing, cyanosis or edema bilaterally. Full range of motion without deformity. Skin: Skin color, texture, turgor normal.  No rashes or lesions. Neurologic:  Neurovascularly intact without any focal sensory/motor deficits. Cranial nerves: II-XII intact, grossly non-focal.  Psychiatric: Alert and oriented, thought content appropriate, normal insight  Capillary Refill: Brisk,< 3 seconds   Peripheral Pulses: +2 palpable, equal bilaterally       Labs:  For convenience and continuity at follow-up the following most recent labs are provided:      CBC:    Lab Results   Component Value Date WBC 7.9 03/23/2021    HGB 13.5 03/23/2021    HCT 38.5 03/23/2021     03/23/2021       Renal:    Lab Results   Component Value Date     03/22/2021    K 3.5 03/22/2021    CL 93 03/22/2021    CO2 32 03/22/2021    BUN 22 03/22/2021    CREATININE 0.8 03/22/2021    CALCIUM 9.4 03/22/2021    PHOS 2.7 05/23/2017         Significant Diagnostic Studies    Radiology:   XR CHEST PORTABLE   Final Result   Stable chronic changes with no acute abnormality seen                Consults:     IP CONSULT TO HOSPITALIST  IP CONSULT TO CARDIOLOGY    Disposition:  home     Condition at Discharge: Stable    Discharge Instructions/Follow-up:  Follow up with PCP within 1-2 weeks. Code Status:  Full Code     Activity: activity as tolerated    Diet: regular diet      Discharge Medications:     Current Discharge Medication List           Details   metoprolol tartrate (LOPRESSOR) 25 MG tablet Take 0.5 tablets by mouth 2 times daily  Qty: 60 tablet, Refills: 3              Details   hydroCHLOROthiazide (HYDRODIURIL) 25 MG tablet Take 25 mg by mouth daily      colchicine (COLCRYS) 0.6 MG tablet       predniSONE (DELTASONE) 10 MG tablet       naproxen (NAPROSYN) 500 MG tablet TAKE 1 TABLET TWICE A DAY  (WITH MEALS)      albuterol sulfate HFA (PROAIR HFA) 108 (90 Base) MCG/ACT inhaler Inhale 2 puffs into the lungs every 4 hours as needed      acetaminophen (TYLENOL) 325 MG tablet Take 650 mg by mouth every 6 hours as needed for Pain      cefUROXime (CEFTIN) 250 MG tablet       umeclidinium-vilanterol (ANORO ELLIPTA) 62.5-25 MCG/INH AEPB inhaler Inhale 1 puff into the lungs daily      sennosides-docusate sodium (SENOKOT-S) 8.6-50 MG tablet Take 2 tablets by mouth daily as needed for Constipation      Furosemide (LASIX PO) Take 40 mg by mouth       aspirin 81 MG EC tablet Take 81 mg by mouth daily. simvastatin (ZOCOR) 10 MG tablet Take 10 mg by mouth nightly.         tamsulosin (FLOMAX) 0.4 MG capsule Take 0.4 mg by mouth daily.        timolol (TIMOPTIC) 0.5 % ophthalmic solution Place 1 drop into both eyes 2 times daily       ketoconazole (NIZORAL) 2 % cream Apply topically daily. Qty: 30 g, Refills: 1      sodium chloride (OCEAN, BABY AYR) 0.65 % nasal spray 2 sprays by Nasal route as needed for Congestion (every  2 hours prn)      promethazine (PHENERGAN) 25 MG tablet Take 0.5-1 tablets by mouth every 6 hours as needed for Nausea. Qty: 30 tablet, Refills: 0               Time Spent on discharge is more than 30 minutes in the examination, evaluation, counseling and review of medications and discharge plan. Signed:    Yenny Beckford MD   3/24/2021      Thank you Jackeline Martínez MD for the opportunity to be involved in this patient's care. If you have any questions or concerns please feel free to contact me at [phone].

## 2021-03-24 NOTE — DISCHARGE INSTR - COC
GASTROINTESTINAL ENDOSCOPY  12/12/2007    Hiatal Hernia, Gastritis    UPPER GASTROINTESTINAL ENDOSCOPY  4/19/2013    gastritis    WRIST SURGERY Right 05/12/2017    I&D right wrist       Immunization History:   Immunization History   Administered Date(s) Administered    Influenza Virus Vaccine 01/19/2013    Pneumococcal Conjugate 7-valent ([employer]) 04/19/2011       Active Problems:  Patient Active Problem List   Diagnosis Code    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 ([facility] [address] G40.409    Near syncope R55    Bradycardia R00.1    Nausea R11.0    Weight loss R63.4    EKG abnormalities R94.31    HTN (hypertension) I10    Hiatal hernia K44.9    PUD (peptic ulcer disease) K27.9    Localized osteoarthrosis, lower leg M17.10    Knee pain, bilateral M25.561, M25.562    Primary osteoarthritis of both knees M17.0    Anterior tibialis tendinitis M76.819    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 116    Thrombophlebitis arm I80.8    Left arm cellulitis L03.114    Palpitations R00.2    PVC's (premature ventricular contractions) I49.3    SOB (shortness of breath) R06.02       Isolation/Infection:   Isolation          No Isolation        Patient Infection Status     None to display          Nurse Assessment:  Last Vital Signs: BP (!) 147/72   Pulse 59   Temp 95.5 °F (35.3 °C) (Oral)   Resp 16   Ht 5' 8\" (1.727 m)   Wt 194 lb 0.1 oz (88 kg)   SpO2 94%   BMI 29.50 kg/m²     Last documented pain score (0-10 scale): Pain Level: 0  Last Weight:   Wt Readings from Last 1 Encounters:   03/24/21 194 lb 0.1 oz (88 kg)     Mental Status:  {IP PT MENTAL STATUS:93694}    IV Access:  {[facility] IV ACCESS:974290934}    Nursing Mobility/ADLs:  Walking   {[facility] QZBO:611118521}  Transfer  {[facility] ZORK:104535582}  Bathing  {[facility] SUBB:082731137}  Dressing  {CHP DME XASE:163896288}  Toileting  {CHP DME JER}  Feeding  {CHP DME ZFVW:461273007}  Med Admin  {CHP DME YEYN:383218960}  Med Delivery   { DEVANG MED Delivery:903283943}    Wound Care Documentation and Therapy:  Incision 17 Knee Left; Anterior (Active)   Number of days: 1414       Incision 17 Knee Right (Active)   Number of days: 1411       Incision 17 Wrist Right (Active)   Number of days: 1411        Elimination:  Continence:   · Bowel: {YES / LW:11173}  · Bladder: {YES / VX:97345}  Urinary Catheter: {Urinary Catheter:780860658}   Colostomy/Ileostomy/Ileal Conduit: {YES / SE:18700}       Date of Last BM: ***    Intake/Output Summary (Last 24 hours) at 3/24/2021 1426  Last data filed at 3/24/2021 1223  Gross per 24 hour   Intake 840 ml   Output 1125 ml   Net -285 ml     I/O last 3 completed shifts:   In: 5 [P.O.:720]  Out: 775 [Urine:775]    Safety Concerns:     [employer] Safety Concerns:738841406}    Impairments/Disabilities:      [employer] Impairments/Disabilities:222026121}    Nutrition Therapy:  Current Nutrition Therapy:   [employer] Diet List:108727406}    Routes of Feeding: {[facility] DME Other Feedings:894951467}  Liquids: {Slp liquid thickness:05390}  Daily Fluid Restriction: {CHP DME Yes amt example:091837743}  Last Modified Barium Swallow with Video (Video Swallowing Test): {Done Not Done AAYX:013105564}    Treatments at the Time of Hospital Discharge:   Respiratory Treatments: ***  Oxygen Therapy:  {Therapy; copd oxygen:71896}  Ventilator:    { NITHYA Vent PDGE:762244149}    Rehab Therapies: {THERAPEUTIC INTERVENTION:3596626985}  Weight Bearing Status/Restrictions: [employer] Weight Bearin}  Other Medical Equipment (for information only, NOT a DME order):  {EQUIPMENT:422605012}  Other Treatments: ***    Patient's personal belongings (please select all that are sent with patient):  {TOMY DME Belongings:030874519}    RN SIGNATURE: {Esignature:764468118}    CASE MANAGEMENT/SOCIAL WORK SECTION    Inpatient Status Date: ***    Readmission Risk Assessment Score:  Readmission Risk              Risk of Unplanned Readmission:        10           Discharging to Facility/ Agency   · Name:   · Address:  · Phone:  · Fax:    Dialysis Facility (if applicable)   · Name:  · Address:  · Dialysis Schedule:  · Phone:  · Fax:    / signature: {Esignature:455877191}    PHYSICIAN SECTION    Prognosis: {Prognosis:5885501189}    Condition at Discharge: [address] [place] [address] Patient Condition:995407951}    Rehab Potential (if transferring to Rehab): {Prognosis:4453121204}    Recommended Labs or Other Treatments After Discharge: ***    Physician Certification: I certify the above information and transfer of Evans Hickman  is necessary for the continuing treatment of the diagnosis listed and that he requires {Admit to Appropriate Level of Care:49213} for {GREATER/LESS:634352590} 30 days.      Update Admission H&P: {CHP DME Changes in JELED:038616024}    PHYSICIAN SIGNATURE:  {Esignature:057620233}

## 2021-03-24 NOTE — PROGRESS NOTES
mg  0.4 mg Oral Daily Juju Berry MD   0.4 mg at 03/24/21 0844    timolol (TIMOPTIC) 0.5 % ophthalmic solution 1 drop  1 drop Both Eyes BID Juju Berry MD   1 drop at 03/24/21 0844    sodium chloride flush 0.9 % injection 10 mL  10 mL Intravenous 2 times per day Juju Berry MD   10 mL at 03/24/21 0843    sodium chloride flush 0.9 % injection 10 mL  10 mL Intravenous PRN Juju Berry MD        promethazine (PHENERGAN) tablet 12.5 mg  12.5 mg Oral Q6H PRN Juju Berry MD        acetaminophen (TYLENOL) tablet 650 mg  650 mg Oral Q6H PRN Juju Berry MD        Or    acetaminophen (TYLENOL) suppository 650 mg  650 mg Rectal Q6H PRN Juju Berry MD        polyethylene glycol (GLYCOLAX) packet 17 g  17 g Oral Daily PRN Juju Berry MD        aspirin chewable tablet 81 mg  81 mg Oral Daily Juju Berry MD   81 mg at 03/24/21 0844    nitroGLYCERIN (NITROSTAT) SL tablet 0.4 mg  0.4 mg Sublingual Q5 Min PRN Juju Berry MD        perflutren lipid microspheres (DEFINITY) injection 1.65 mg  1.5 mL Intravenous ONCE PRN Juju Berry MD        pantoprazole (PROTONIX) tablet 40 mg  40 mg Oral QAM AC Juju Berry MD   40 mg at 03/24/21 0844     Review of Systems   Constitutional: Negative. Respiratory: Negative. Cardiovascular: Negative. Gastrointestinal: Negative. Neurological: Negative.       Objective:     Telemetry monitor: SR 50s    Physical Exam:  Constitutional:  Comfortable and alert, NAD, appears stated age  Eyes: PERRL, sclera nonicteric  Neck:  Supple, no masses, no thyroidmegaly, no JVD  Skin:  Warm and dry; no rash or lesions  Heart: Regular, normal apex, S1 and S2 normal, +murmur  Lungs:  Normal respiratory effort; clear; no wheezing/rhonchi/rales  Abdomen: soft, non tender, + bowel sounds  Extremities:  No edema or cyanosis; no clubbing  Neuro: alert and oriented, moves legs and arms equally, normal mood and affect    Data Reviewed:    RACHAEL 2017:   No valvular endocarditis seen. Left ventricular systolic function is normal with ejection fraction   estimated at 55 %. Lab Reviewed:     Renal Profile:  Lab Results   Component Value Date    CREATININE 0.8 03/22/2021    BUN 22 03/22/2021     03/22/2021    K 3.5 03/22/2021    CL 93 03/22/2021    CO2 32 03/22/2021     CBC:    Lab Results   Component Value Date    WBC 7.9 03/23/2021    RBC 4.11 03/23/2021    HGB 13.5 03/23/2021    HCT 38.5 03/23/2021    MCV 93.7 03/23/2021    RDW 13.9 03/23/2021     03/23/2021     BNP:  No results found for: PROBNP  Fasting Lipid Panel:    Lab Results   Component Value Date    CHOL 161 03/23/2021    HDL 64 03/23/2021    HDL 60 02/24/2012    TRIG 89 03/23/2021     Cardiac Enzymes:  CK/MbTroponin  Lab Results   Component Value Date    CKMB 1.72 03/31/2013    TROPONINI <0.01 03/23/2021     PT/ INR   Lab Results   Component Value Date    INR 1.14 03/31/2013    PROTIME 12.9 03/31/2013     PTT No results found for: PTT   Lab Results   Component Value Date    MG 1.90 03/22/2021      Lab Results   Component Value Date    TSH 0.33 04/19/2013     All labs and imaging reviewed today    Assessment:  Palpitations: improved  Frequent PVCs  Bradycardia: improved  Murmur    Plan:   1. Await echo  2. Decrease metoprolol to 12.5 mg BID due to bradycardia  3. Continue magnesium oxide  4. Pending echo results, ok to discharge from cardiology perspective. Follow up will be arranged.      Jewels Cordero, APRN-CNP  [employer]  [phone]

## 2021-03-24 NOTE — PLAN OF CARE
Patient discharge completed. Discharge information included information on diagnosis including signs and symptoms, complications and when to seek medical attention. Information on new medications also provided included use for the medication, side effects and when to call the doctor. Patient verbalized understanding of all discharge information. Patient escorted out by staff with all documented belongings. Home with family.

## 2021-03-30 ENCOUNTER — HOSPITAL ENCOUNTER (INPATIENT)
Age: 86
LOS: 7 days | Discharge: HOME HEALTH | DRG: 309 | End: 2021-04-06
Attending: EMERGENCY MEDICINE | Admitting: HOSPITALIST
Payer: MEDICARE

## 2021-03-30 ENCOUNTER — APPOINTMENT (OUTPATIENT)
Dept: GENERAL RADIOLOGY | Age: 86
DRG: 309 | End: 2021-03-30
Payer: MEDICARE

## 2021-03-30 DIAGNOSIS — R55 SYNCOPE AND COLLAPSE: Primary | ICD-10-CM

## 2021-03-30 DIAGNOSIS — E87.6 HYPOKALEMIA: ICD-10-CM

## 2021-03-30 DIAGNOSIS — R00.1 SINUS BRADYCARDIA: ICD-10-CM

## 2021-03-30 DIAGNOSIS — I49.3 PVC'S (PREMATURE VENTRICULAR CONTRACTIONS): ICD-10-CM

## 2021-03-30 DIAGNOSIS — M25.469 SWELLING OF KNEE JOINT, UNSPECIFIED LATERALITY: ICD-10-CM

## 2021-03-30 PROBLEM — E87.1 HYPONATREMIA: Status: ACTIVE | Noted: 2021-03-30

## 2021-03-30 LAB
A/G RATIO: 1.6 (ref 1.1–2.2)
ALBUMIN SERPL-MCNC: 3.9 G/DL (ref 3.4–5)
ALP BLD-CCNC: 42 U/L (ref 40–129)
ALT SERPL-CCNC: 22 U/L (ref 10–40)
ANION GAP SERPL CALCULATED.3IONS-SCNC: 9 MMOL/L (ref 3–16)
AST SERPL-CCNC: 23 U/L (ref 15–37)
BASOPHILS ABSOLUTE: 0 K/UL (ref 0–0.2)
BASOPHILS RELATIVE PERCENT: 0.6 %
BILIRUB SERPL-MCNC: 0.6 MG/DL (ref 0–1)
BILIRUBIN URINE: NEGATIVE
BLOOD, URINE: NEGATIVE
BUN BLDV-MCNC: 23 MG/DL (ref 7–20)
CALCIUM SERPL-MCNC: 9.3 MG/DL (ref 8.3–10.6)
CHLORIDE BLD-SCNC: 91 MMOL/L (ref 99–110)
CLARITY: CLEAR
CO2: 31 MMOL/L (ref 21–32)
COLOR: YELLOW
CREAT SERPL-MCNC: 0.7 MG/DL (ref 0.8–1.3)
EKG ATRIAL RATE: 56 BPM
EKG DIAGNOSIS: NORMAL
EKG P AXIS: 38 DEGREES
EKG P-R INTERVAL: 210 MS
EKG Q-T INTERVAL: 512 MS
EKG QRS DURATION: 170 MS
EKG QTC CALCULATION (BAZETT): 494 MS
EKG R AXIS: -51 DEGREES
EKG T AXIS: -8 DEGREES
EKG VENTRICULAR RATE: 56 BPM
EOSINOPHILS ABSOLUTE: 0.1 K/UL (ref 0–0.6)
EOSINOPHILS RELATIVE PERCENT: 1 %
GFR AFRICAN AMERICAN: >60
GFR NON-AFRICAN AMERICAN: >60
GLOBULIN: 2.5 G/DL
GLUCOSE BLD-MCNC: 135 MG/DL (ref 70–99)
GLUCOSE URINE: NEGATIVE MG/DL
HCT VFR BLD CALC: 40 % (ref 40.5–52.5)
HEMOGLOBIN: 13.8 G/DL (ref 13.5–17.5)
KETONES, URINE: NEGATIVE MG/DL
LEUKOCYTE ESTERASE, URINE: NEGATIVE
LYMPHOCYTES ABSOLUTE: 0.7 K/UL (ref 1–5.1)
LYMPHOCYTES RELATIVE PERCENT: 9.4 %
MCH RBC QN AUTO: 32.2 PG (ref 26–34)
MCHC RBC AUTO-ENTMCNC: 34.6 G/DL (ref 31–36)
MCV RBC AUTO: 93.1 FL (ref 80–100)
MICROSCOPIC EXAMINATION: NORMAL
MONOCYTES ABSOLUTE: 0.6 K/UL (ref 0–1.3)
MONOCYTES RELATIVE PERCENT: 8.5 %
NEUTROPHILS ABSOLUTE: 6 K/UL (ref 1.7–7.7)
NEUTROPHILS RELATIVE PERCENT: 80.5 %
NITRITE, URINE: NEGATIVE
PDW BLD-RTO: 14.1 % (ref 12.4–15.4)
PH UA: 6.5 (ref 5–8)
PLATELET # BLD: 253 K/UL (ref 135–450)
PMV BLD AUTO: 6.8 FL (ref 5–10.5)
POTASSIUM SERPL-SCNC: 3.3 MMOL/L (ref 3.5–5.1)
PROTEIN UA: NEGATIVE MG/DL
RBC # BLD: 4.3 M/UL (ref 4.2–5.9)
SODIUM BLD-SCNC: 131 MMOL/L (ref 136–145)
SPECIFIC GRAVITY UA: 1.02 (ref 1–1.03)
TOTAL PROTEIN: 6.4 G/DL (ref 6.4–8.2)
TROPONIN: <0.01 NG/ML
URINE TYPE: NORMAL
UROBILINOGEN, URINE: 0.2 E.U./DL
WBC # BLD: 7.4 K/UL (ref 4–11)

## 2021-03-30 PROCEDURE — 96365 THER/PROPH/DIAG IV INF INIT: CPT

## 2021-03-30 PROCEDURE — 71045 X-RAY EXAM CHEST 1 VIEW: CPT

## 2021-03-30 PROCEDURE — 84484 ASSAY OF TROPONIN QUANT: CPT

## 2021-03-30 PROCEDURE — 80053 COMPREHEN METABOLIC PANEL: CPT

## 2021-03-30 PROCEDURE — 99284 EMERGENCY DEPT VISIT MOD MDM: CPT

## 2021-03-30 PROCEDURE — 2580000003 HC RX 258: Performed by: HOSPITALIST

## 2021-03-30 PROCEDURE — 6370000000 HC RX 637 (ALT 250 FOR IP): Performed by: HOSPITALIST

## 2021-03-30 PROCEDURE — 93005 ELECTROCARDIOGRAM TRACING: CPT | Performed by: EMERGENCY MEDICINE

## 2021-03-30 PROCEDURE — 81003 URINALYSIS AUTO W/O SCOPE: CPT

## 2021-03-30 PROCEDURE — 85025 COMPLETE CBC W/AUTO DIFF WBC: CPT

## 2021-03-30 PROCEDURE — 93010 ELECTROCARDIOGRAM REPORT: CPT | Performed by: INTERNAL MEDICINE

## 2021-03-30 PROCEDURE — 96366 THER/PROPH/DIAG IV INF ADDON: CPT

## 2021-03-30 PROCEDURE — 1200000000 HC SEMI PRIVATE

## 2021-03-30 PROCEDURE — 6360000002 HC RX W HCPCS: Performed by: EMERGENCY MEDICINE

## 2021-03-30 PROCEDURE — 6360000002 HC RX W HCPCS: Performed by: HOSPITALIST

## 2021-03-30 RX ORDER — ASPIRIN 81 MG/1
81 TABLET ORAL DAILY
Status: DISCONTINUED | OUTPATIENT
Start: 2021-03-31 | End: 2021-04-06 | Stop reason: HOSPADM

## 2021-03-30 RX ORDER — ACETAMINOPHEN 325 MG/1
650 TABLET ORAL EVERY 6 HOURS PRN
Status: DISCONTINUED | OUTPATIENT
Start: 2021-03-30 | End: 2021-04-06 | Stop reason: HOSPADM

## 2021-03-30 RX ORDER — SENNA PLUS 8.6 MG/1
1 TABLET ORAL DAILY PRN
Status: DISCONTINUED | OUTPATIENT
Start: 2021-03-30 | End: 2021-04-06 | Stop reason: HOSPADM

## 2021-03-30 RX ORDER — SODIUM CHLORIDE 0.9 % (FLUSH) 0.9 %
10 SYRINGE (ML) INJECTION PRN
Status: DISCONTINUED | OUTPATIENT
Start: 2021-03-30 | End: 2021-04-06 | Stop reason: HOSPADM

## 2021-03-30 RX ORDER — TAMSULOSIN HYDROCHLORIDE 0.4 MG/1
0.4 CAPSULE ORAL DAILY
Status: DISCONTINUED | OUTPATIENT
Start: 2021-03-31 | End: 2021-04-06 | Stop reason: HOSPADM

## 2021-03-30 RX ORDER — PROMETHAZINE HYDROCHLORIDE 25 MG/1
12.5 TABLET ORAL EVERY 6 HOURS PRN
Status: DISCONTINUED | OUTPATIENT
Start: 2021-03-30 | End: 2021-04-06 | Stop reason: HOSPADM

## 2021-03-30 RX ORDER — POTASSIUM CHLORIDE 20 MEQ/1
40 TABLET, EXTENDED RELEASE ORAL PRN
Status: DISCONTINUED | OUTPATIENT
Start: 2021-03-30 | End: 2021-04-06 | Stop reason: HOSPADM

## 2021-03-30 RX ORDER — MAGNESIUM SULFATE IN WATER 40 MG/ML
2000 INJECTION, SOLUTION INTRAVENOUS PRN
Status: DISCONTINUED | OUTPATIENT
Start: 2021-03-30 | End: 2021-04-06 | Stop reason: HOSPADM

## 2021-03-30 RX ORDER — SODIUM CHLORIDE 0.9 % (FLUSH) 0.9 %
10 SYRINGE (ML) INJECTION EVERY 12 HOURS SCHEDULED
Status: DISCONTINUED | OUTPATIENT
Start: 2021-03-30 | End: 2021-04-06 | Stop reason: HOSPADM

## 2021-03-30 RX ORDER — PREDNISONE 10 MG/1
10 TABLET ORAL DAILY
Status: DISCONTINUED | OUTPATIENT
Start: 2021-03-31 | End: 2021-04-06 | Stop reason: HOSPADM

## 2021-03-30 RX ORDER — FAMOTIDINE 20 MG/1
20 TABLET, FILM COATED ORAL DAILY
Status: DISCONTINUED | OUTPATIENT
Start: 2021-03-30 | End: 2021-04-06 | Stop reason: HOSPADM

## 2021-03-30 RX ORDER — POTASSIUM CHLORIDE 7.45 MG/ML
10 INJECTION INTRAVENOUS PRN
Status: DISCONTINUED | OUTPATIENT
Start: 2021-03-30 | End: 2021-04-06 | Stop reason: HOSPADM

## 2021-03-30 RX ORDER — SODIUM CHLORIDE 9 MG/ML
INJECTION, SOLUTION INTRAVENOUS CONTINUOUS
Status: ACTIVE | OUTPATIENT
Start: 2021-03-30 | End: 2021-03-31

## 2021-03-30 RX ORDER — TIMOLOL MALEATE 5 MG/ML
1 SOLUTION/ DROPS OPHTHALMIC 2 TIMES DAILY
Status: DISCONTINUED | OUTPATIENT
Start: 2021-03-30 | End: 2021-04-06 | Stop reason: HOSPADM

## 2021-03-30 RX ORDER — ATORVASTATIN CALCIUM 10 MG/1
10 TABLET, FILM COATED ORAL DAILY
Status: DISCONTINUED | OUTPATIENT
Start: 2021-03-31 | End: 2021-04-06 | Stop reason: HOSPADM

## 2021-03-30 RX ORDER — ACETAMINOPHEN 650 MG/1
650 SUPPOSITORY RECTAL EVERY 6 HOURS PRN
Status: DISCONTINUED | OUTPATIENT
Start: 2021-03-30 | End: 2021-04-06 | Stop reason: HOSPADM

## 2021-03-30 RX ORDER — ONDANSETRON 2 MG/ML
4 INJECTION INTRAMUSCULAR; INTRAVENOUS EVERY 6 HOURS PRN
Status: DISCONTINUED | OUTPATIENT
Start: 2021-03-30 | End: 2021-04-06 | Stop reason: HOSPADM

## 2021-03-30 RX ORDER — SENNA AND DOCUSATE SODIUM 50; 8.6 MG/1; MG/1
2 TABLET, FILM COATED ORAL DAILY PRN
Status: DISCONTINUED | OUTPATIENT
Start: 2021-03-30 | End: 2021-04-06 | Stop reason: HOSPADM

## 2021-03-30 RX ORDER — SODIUM CHLORIDE 9 MG/ML
25 INJECTION, SOLUTION INTRAVENOUS PRN
Status: DISCONTINUED | OUTPATIENT
Start: 2021-03-30 | End: 2021-04-06 | Stop reason: HOSPADM

## 2021-03-30 RX ORDER — POTASSIUM CHLORIDE 7.45 MG/ML
10 INJECTION INTRAVENOUS ONCE
Status: COMPLETED | OUTPATIENT
Start: 2021-03-30 | End: 2021-03-30

## 2021-03-30 RX ADMIN — POTASSIUM CHLORIDE 10 MEQ: 7.46 INJECTION, SOLUTION INTRAVENOUS at 18:15

## 2021-03-30 RX ADMIN — SODIUM CHLORIDE: 9 INJECTION, SOLUTION INTRAVENOUS at 22:06

## 2021-03-30 RX ADMIN — ENOXAPARIN SODIUM 40 MG: 40 INJECTION SUBCUTANEOUS at 22:06

## 2021-03-30 RX ADMIN — Medication 10 ML: at 22:07

## 2021-03-30 RX ADMIN — TIMOLOL MALEATE 1 DROP: 5 SOLUTION OPHTHALMIC at 23:04

## 2021-03-30 RX ADMIN — FAMOTIDINE 20 MG: 20 TABLET ORAL at 22:06

## 2021-03-30 NOTE — ED NOTES

## 2021-03-30 NOTE — ED NOTES
Granddaughter advised via phone of pt's condition. Pt advised of call.        Aubree Sierra, RN  03/30/21 9224

## 2021-03-30 NOTE — ED PROVIDER NOTES
CHIEF COMPLAINT  Loss of Consciousness (per pt family member he passed out earlier unaware of how long he was out/seen here last week for heart issues\")      HISTORY OF PRESENT ILLNESS  Kelli Mcgee is a 80 y.o. male with a history of prostate cancer; reflux disease; gastric ulcer; hyperlipidemia; hypertension; anxiety; lives alone with the assistance of a visiting nurse who presents to the ED complaining of brief syncope with no known precipitating factors. He says he woke up on his kitchen floor after he was preparing some food for himself. He was able to get himself up off the ground and has no physical complaints in the ED. He did just start metoprolol low-dose which was prescribed by cardiology for recent hospitalization for palpitations and chest pain. He is not on Eliquis although it was listed on his med list and was discharged on March 24 of this year and was told specifically did not take it although family reports that he had not been taking it. Notes also report that he was tolerating and heart rate in the 50s without being symptomatic. No other complaints, modifying factors or associated symptoms. I have reviewed the following from the nursing documentation. Past Medical History:   Diagnosis Date    Cancer [facility])     prostate  HAD SEED IMPLANTS    GERD (gastroesophageal reflux disease)     Glaucoma     History of stomach ulcers 1993+/-    Hyperlipidemia     Hypertension     Nausea alone     Pneumonia     1944    Psychiatric problem     anxiety    S/P radiation therapy 2007    Prostate seeds    Seasonal allergies     Staphylococcal arthritis of left knee ([facility] [address] 5/9/2017     Past Surgical History:   Procedure Laterality Date    APPENDECTOMY      CATARACT REMOVAL WITH IMPLANT Right 12/01/2016    CATARACT REMOVAL WITH IMPLANT Left 12/13/2016    CHOLECYSTECTOMY      COLONOSCOPY  10/20/1999    polyp    COLONOSCOPY  01/16/2002    ?     ENDOSCOPY, COLON, DIAGNOSTIC      EYE SURGERY      cataract implants bilat eyes    KNEE SURGERY Right 05/12/2017    video arthroscopy and I&D right knee, partial medial and lateral meniscectomy right knee    OTHER SURGICAL HISTORY  7/27/2012    excision squamouscell carcinoma     right cheek     OTHER SURGICAL HISTORY Left 05/09/2017    Irrigation and Drainage of left knee    PROSTATE BIOPSY  2007    Cancer treated with seeds    SKIN BIOPSY      cyst    UPPER GASTROINTESTINAL ENDOSCOPY  04/30/2004    Hiatal Hernia, Gastritis    UPPER GASTROINTESTINAL ENDOSCOPY  12/12/2007    Hiatal Hernia, Gastritis    UPPER GASTROINTESTINAL ENDOSCOPY  4/19/2013    gastritis    WRIST SURGERY Right 05/12/2017    I&D right wrist     Family History   Problem Relation Age of Onset    Cancer Daughter         ovarian    Stroke Mother     High Blood Pressure Father     Other Father         circulation issues     Social History     Socioeconomic History    Marital status:      Spouse name:  Debo Soler Number of children: 1    Years of education: Not on file    Highest education level: Not on file   Occupational History    Not on file   Social Needs    Financial resource strain: Not on file    Food insecurity     Worry: Not on file     Inability: Not on file    Transportation needs     Medical: Not on file     Non-medical: Not on file   Tobacco Use    Smoking status: Former Smoker     Years: 38.00     Types: Cigars    Smokeless tobacco: Never Used   Substance and Sexual Activity    Alcohol use: No    Drug use: No    Sexual activity: Not on file   Lifestyle    Physical activity     Days per week: Not on file     Minutes per session: Not on file    Stress: Not on file   Relationships    Social connections     Talks on phone: Not on file     Gets together: Not on file     Attends Gnosticist service: Not on file     Active member of club or organization: Not on file     Attends meetings of clubs or organizations: Not on file     Relationship status: Not on file    Intimate partner violence     Fear of current or ex partner: Not on file     Emotionally abused: Not on file     Physically abused: Not on file     Forced sexual activity: Not on file   Other Topics Concern    Not on file   Social History Narrative    Not on file     Current Facility-Administered Medications   Medication Dose Route Frequency Provider Last Rate Last Admin    oxyCODONE-acetaminophen (PERCOCET) 5-325 MG per tablet 1 tablet  1 tablet Oral Q4H PRN Ronald Alter, APRN - CNP        Or    oxyCODONE-acetaminophen (PERCOCET) 5-325 MG per tablet 2 tablet  2 tablet Oral Q4H PRN Ronald Alter, APRN - CNP        amLODIPine (NORVASC) tablet 2.5 mg  2.5 mg Oral Daily Ronald Alter, APRN - CNP   2.5 mg at 04/02/21 1003    acetaminophen (TYLENOL) tablet 650 mg  650 mg Oral Q6H PRN Yaw Domínguez MD        aspirin EC tablet 81 mg  81 mg Oral Daily Yaw Domínguez MD   81 mg at 04/02/21 1004    glycopyrrolate-formoterol (BEVESPI) 9-4.8 MCG/ACT inhaler 2 puff  2 puff Inhalation BID Yaw Domínguez MD   2 puff at 04/02/21 0800    timolol (TIMOPTIC) 0.5 % ophthalmic solution 1 drop  1 drop Both Eyes BID Yaw Domínguez MD   1 drop at 04/02/21 1005    tamsulosin (FLOMAX) capsule 0.4 mg  0.4 mg Oral Daily Yaw Domínguez MD   0.4 mg at 04/02/21 1004    sodium chloride (OCEAN, BABY AYR) 0.65 % nasal spray 2 spray  2 spray Nasal PRN Yaw Domínguez MD        atorvastatin (LIPITOR) tablet 10 mg  10 mg Oral Daily Yaw Domínguez MD   10 mg at 04/02/21 1004    sennosides-docusate sodium (SENOKOT-S) 8.6-50 MG tablet 2 tablet  2 tablet Oral Daily PRN Yaw Domínguez MD        predniSONE (DELTASONE) tablet 10 mg  10 mg Oral Daily Yaw Domínguez MD   10 mg at 04/02/21 1004    magnesium oxide (MAG-OX) tablet 400 mg  400 mg Oral Daily Yaw Domínguez MD   400 mg at 04/02/21 1004    sodium chloride flush 0.9 % injection 10 mL  10 mL Intravenous 2 times per day Yaw Domínguez MD 10 mL at 04/02/21 1008    sodium chloride flush 0.9 % injection 10 mL  10 mL Intravenous PRN Radha Laguerre MD        0.9 % sodium chloride infusion  25 mL Intravenous PRN Radha Laguerre MD        potassium chloride (KLOR-CON M) extended release tablet 40 mEq  40 mEq Oral PRN Radha Laguerre MD        Or    potassium bicarb-citric acid (EFFER-K) effervescent tablet 40 mEq  40 mEq Oral PRN Radha Laguerre MD        Or    potassium chloride 10 mEq/100 mL IVPB (Peripheral Line)  10 mEq Intravenous PRN Radha Laguerre MD        magnesium sulfate 2000 mg in 50 mL IVPB premix  2,000 mg Intravenous PRN Radha Laguerre MD        enoxaparin (LOVENOX) injection 40 mg  40 mg Subcutaneous Daily Radha Laguerre MD   40 mg at 04/02/21 1004    promethazine (PHENERGAN) tablet 12.5 mg  12.5 mg Oral Q6H PRN Radha Laguerre MD        Or    [facility]ansetron [facility]) injection 4 mg  4 mg Intravenous Q6H PRN Radha Laguerre MD        senna (SENOKOT) tablet 8.6 mg  1 tablet Oral Daily PRN Radha Laguerre MD        acetaminophen (TYLENOL) tablet 650 mg  650 mg Oral Q6H PRN Radha Laguerre MD   650 mg at 04/02/21 1635    Or    acetaminophen (TYLENOL) suppository 650 mg  650 mg Rectal Q6H PRN Radha Laguerre MD        famotidine (PEPCID) tablet 20 mg  20 mg Oral Daily Radha Laguerre MD   20 mg at 04/02/21 1004     Allergies   Allergen Reactions    Neomycin-Polymyxin-Gramicidin     Neosporin [Bacitracin-Neomycin-Polymyxin] Rash       REVIEW OF SYSTEMS  10 systems reviewed, pertinent positives per HPI otherwise noted to be negative. PHYSICAL EXAM  BP (!) 168/79   Pulse 70   Temp 98 °F (36.7 °C) (Oral)   Resp 17   Wt 146 lb 9.7 oz (66.5 kg)   SpO2 95%   BMI 22.29 kg/m²   GENERAL APPEARANCE: Awake and alert. Cooperative. No acute distress. Frail  HEAD: Normocephalic. Atraumatic. EYES: PERRL. EOM's grossly intact. Test of Skew:negative  ENT: Mucous membranes are moist.   NECK: Supple, trachea midline.    HEART: RRR. Normal S1S2, no rubs, gallops, or murmurs noted  LUNGS: Respirations unlabored. CTAB. Good air exchange. No wheezes, rales, or rhonchi. Speaking comfortably in full sentences. ABDOMEN: Soft. Non-distended. Non-tender. No guarding or rebound. Normal bowel sounds. EXTREMITIES: No peripheral edema. MAEE. No acute deformities. SKIN: Warm and dry. No acute rashes. NEUROLOGICAL: Alert and oriented X 3. CN II-XII intact. No gross facial drooping. Strength 5/5, sensation intact. Normal coordination. No pronator drift. No truncal instability; Gait normal.   PSYCHIATRIC: Normal mood and affect. LABS  I have reviewed all labs for this visit.    Labs Reviewed   CBC WITH AUTO DIFFERENTIAL - Abnormal; Notable for the following components:       Result Value    Hematocrit 40.0 (*)     Lymphocytes Absolute 0.7 (*)     All other components within normal limits    Narrative:     Performed at:  [address],  [address] [place] [address], [facility] [employer]   Phone [phone]   COMPREHENSIVE METABOLIC PANEL - Abnormal; Notable for the following components:    Sodium 131 (*)     Potassium 3.3 (*)     Chloride 91 (*)     Glucose 135 (*)     BUN 23 (*)     CREATININE 0.7 (*)     All other components within normal limits    Narrative:     Performed at:  [address],  [address] [place] [address], [facility] [employer]   Phone [phone]   CBC WITH AUTO DIFFERENTIAL - Abnormal; Notable for the following components:    RBC 4.18 (*)     Hematocrit 39.1 (*)     All other components within normal limits    Narrative:     Performed at:  [address],  [address] [place] [address], [facility] [employer]   Phone [phone]   COMPREHENSIVE METABOLIC PANEL W/ REFLEX TO MG FOR LOW K - Abnormal; Notable for the following components:    Glucose 106 (*)     CREATININE 0.6 (*)     Total Protein 5.8 (*)     All other components within normal limits    Narrative: Performed at:  [address], [facility] [place]   Phone [phone]   URINALYSIS    Narrative:     Performed at:  [facility] [place] [address] [place]   Phone [phone]   TROPONIN    Narrative:     Performed at:  [address], [facility] [place]   Phone [phone]   HEMOGLOBIN A1C    Narrative:     Performed at:  [facility]  [address] [facility]   Phone [phone]       EKG  The Ekg interpreted by myself  sinus bradycardia, rate=56   Axis is   Left axis deviation  QTc is  494  Intervals and Durations are unremarkable with the exception of first-degree AV block; bifascicular block; minimal voltage criteria for LVH      No specific ST-T wave changes appreciated. No evidence of acute ischemia. Significant change from prior EKG dated March 22, 2021: Sinus bradycardia with first-degree AV block now president    Cardiac Monitoring: sinus bradycardia without ectopy        RADIOLOGY  X-RAYS:  I have reviewed radiologic plain film image(s). ALL OTHER NON-PLAIN FILM IMAGES SUCH AS CT, ULTRASOUND AND MRI HAVE BEEN READ BY THE RADIOLOGIST. XR CHEST PORTABLE   Final Result   No acute process. Bibasilar hypoaeration                Rechecks: Physical assessment performed. Stable symptoms in the ED      ED COURSE/MDM  Patient seen and evaluated. Old records reviewed. Differential diagnosis includes arrhythmia; seizure; occult infection. Labs and imaging reviewed and results discussed with patient. Patient was given potassium in the ED. Patient was reassessed as noted above. Plan of care discussed with patient and family. Patient and family in agreement with plan. CLINICAL IMPRESSION  1. Syncope and collapse    2. Hypokalemia    3. Sinus bradycardia    4.  PVC's (premature ventricular contractions) Blood pressure (!) 168/79, pulse 70, temperature 98 °F (36.7 °C), temperature source Oral, resp. rate 17, weight 146 lb 9.7 oz (66.5 kg), SpO2 95 %. DISPOSITION  Andreas Stoll was admitted in guarded condition.       Jack Gomez MD  04/02/21 5495

## 2021-03-30 NOTE — ED NOTES
PT scores as fall risk. Pt has fall band on, bed alarm on and call light within reach. Pt has non skid shoes on and is alert and oriented.       Yasmeen Valente RN  03/30/21 2902

## 2021-03-30 NOTE — H&P
HOSPITALISTS HISTORY AND PHYSICAL    3/30/2021 7:52 PM    Patient Information:  Carleen Aldana is a 80 y.o. male [id number]  PCP:  Ray Cifuentes MD (Tel: [phone] )    Chief complaint:    Chief Complaint   Patient presents with    Loss of Consciousness     per pt family member he passed out earlier unaware of how long he was out/seen here last week for heart issues\"        History of Present Illness:  Hugo Avila is a 80 y.o. male who presented to the ED to be evaluated for syncope and collapse less than 1 week following recent hospitalization for trigeminy, dyspnea. During recent hospitalization the patient was treated with Ceftin for possible bronchopneumonia, and he underwent telemetry monitoring and TTE to evaluate his cardiac palpitations. Trigeminy tracing pattern was identified, and cardiology consultation initiated low-dose metoprolol 12.5 mg twice daily. The patient was noted to be mildly bradycardic in the upper 50s following initiation of said medication, however he tolerated it without difficulty. Review of his recent hospital record as well as discharge summary reveals that while he was admitted, his Lasix and HCTZ was held due to concerns of overdiuresis. Discharge instructions stated that he was to resume both medications which is likely resulted in his current hyponatremia and hypokalemia at 131/3.3 respectively. Upon arrival to the ED earlier this morning EKG was obtained and notable for sinus bradycardia with first-degree AV block, RBBB, and left anterior fascicular block. CXR was notable for bibasilar hypoaeration but otherwise interpreted as normal.  Patient will be admitted for syncope and collapse with notable bradycardia with bifascicular block.       History obtained from patient and review of Epic chart      REVIEW OF SYSTEMS:   Constitutional: Negative for fever,chills,weight loss, and generalized weakness  ENT: Negative for rhinorrhea, epistaxis, hoarseness, and sore throat. Respiratory: Chronic shortness of breath; without wheezing, and cough  Cardiovascular: Negative for chest pain, palpitations, and peripheral edema; no orthopnea or PND  Gastrointestinal: Negative for N/V/D; no hematemesis, hematochezia, or melena; no anorexia  Genitourinary: Negative for dysuria, frequency, hesitancy, and urgency; no incontinence  Hematologic/Lymphatic: Negative for bleeding tendency, excessive bruising, and enlarged LN  Musculoskeletal: Negative for myalgias and arthalgias; able to ambulate without difficulty  Neurologic: Positive LOC with syncope; no seizure activity, paresthesias, dysarthria, vertigo, and gait disturbance  Skin: Negative for itching,rash  Psychiatric: Negative for depression,anxiety, and agitation; denies SI/HI  Endocrine: Negative for polyuria/polydipsia/polyphagia; no heat/cold intolerance    Past Medical History:   has a past medical history of Cancer ([facility] [address] GERD (gastroesophageal reflux disease), Glaucoma, History of stomach ulcers, Hyperlipidemia, Hypertension, Nausea alone, Pneumonia, Psychiatric problem, S/P radiation therapy, Seasonal allergies, and Staphylococcal arthritis of left knee ([facility] [address] Past Surgical History:   has a past surgical history that includes Appendectomy; Cholecystectomy; skin biopsy; other surgical history (7/27/2012); Colonoscopy (10/20/1999); Colonoscopy (01/16/2002); Upper gastrointestinal endoscopy (04/30/2004); Upper gastrointestinal endoscopy (12/12/2007); Prostate biopsy (2007); Upper gastrointestinal endoscopy (4/19/2013); Cataract removal with implant (Right, 12/01/2016); Cataract removal with implant (Left, 12/13/2016); other surgical history (Left, 05/09/2017); Wrist surgery (Right, 05/12/2017); knee surgery (Right, 05/12/2017); eye surgery; and Endoscopy, colon, diagnostic.      Medications:  No current cigars. He has smoked for the past 38.00 years. He has never used smokeless tobacco. He reports that he does not drink alcohol or use drugs. Family History:  family history includes Cancer in his daughter; High Blood Pressure in his father; Other in his father; Stroke in his mother.      Physical Exam:  BP (!) 143/72   Pulse 59   Temp 97.5 °F (36.4 °C) (Oral)   Resp 18   Wt 195 lb (88.5 kg)   SpO2 93%   BMI 29.65 kg/m²     General appearance: WDWN male sleeping in bed but easily awakened  Eyes: Sclera clear without conjunctival injection; PERRLA; EOMI  ENT: Mucous membranes moist without thrush; normal dentition  Neck: Supple without meningismus; no goiter; no carotid bruit bilaterally  Cardiovascular: Bradycardic rhythm with frequent ectopy; normal S1-S2 with 3/6 holosystolic murmur; no peripheral edema; no JVD  Respiratory: No tachypnea; CTAB with adequate diminished basilar air exchange, no wheeze, rhonchi or rales; I:E intact  Gastrointestinal: Abdomen soft, non-tender, not distended; bowel sounds normal x4 quadrants; no masses/organomegaly appreciated  Musculoskeletal: FROM spine and extremities x4; no gross deformity  Neurology: A&O x3; cranial nerves 2-12 grossly intact; motor 5/5  BUE/BLE; finger-to-nose/heel-to-shin intact; no pronator drift; no seizure activity  Psychiatry: Well-groomed with good eye contact; appropriate affect; no visual/auditory hallucination  Skin: Warm, dry, normal turgor, no rash  PV: 2/4 radial and dorsalis pedis bilaterally; brisk capillary refill    Labs:  CBC:   Lab Results   Component Value Date    WBC 7.4 03/30/2021    RBC 4.30 03/30/2021    HGB 13.8 03/30/2021    HCT 40.0 03/30/2021    MCV 93.1 03/30/2021    MCH 32.2 03/30/2021    MCHC 34.6 03/30/2021    RDW 14.1 03/30/2021     03/30/2021    MPV 6.8 03/30/2021     BMP:    Lab Results   Component Value Date     03/30/2021    K 3.3 03/30/2021    CL 91 03/30/2021    CO2 31 03/30/2021    BUN 23 03/30/2021 sliding scale  -Once again, HCTZ and Lasix will be held for now with strict I's and O's  -If diuretic therapy deemed absolutely necessary at time of discharge, recommend Lasix be used instead of HCTZ (due to less potential for loop diuretic to cause electrolyte disturbance). COPD  -Patient stable on room air  -Recommend incentive spirometry every 4 hours while awake  -Continue Anoro Ellipta inhaler per home dosage  -Patient on prednisone 10 mg daily which has been confirmed with his PCP and family members; continue at current dosage for now    History of PUD  -Continue ASA but not sure enteric-coated  -NSAIDs currently held as they may be contributing to his hyponatremia  -Initiate H2 blocker Pepcid once daily (rather than PPI as latter agent may worsen hyponatremia)      DVT prophylaxis-Lovenox 40 mg subcu daily  Code status-full code  Diet-cardiac MANE with 2000 cc fluid restriction (an attempt to avoid need for diuretic)  IV access-PIV established in ED      Admit as inpatient. I anticipate hospitalization spanning more than two midnights for investigation and treatment of the above medically necessary diagnoses. Please note that some part of this chart was generated using Dragon dictation software. Although every effort was made to ensure the accuracy of this automated transcription, some errors in transcription may have occurred inadvertently. If you may need any clarification, please do not hesitate to contact me through [facility].        Luis Hui MD    3/30/2021 7:52 PM

## 2021-03-30 NOTE — ADT AUTH CERT
Cardiology [address] Day 3 (3/24/2021) by Camron Bean RN       Review Status Review Entered   Completed 3/30/2021 12:02      Criteria Review      Care Day: 3 Care Date: 3/24/2021 Level of Care: Step Down    Guideline Day 3    Level Of Care    (X) * Activity level acceptable    3/30/2021 12:02 PM EDT by Hilaria Jaimes dc home today    Clinical Status    (X) * Hemodynamic stability    3/30/2021 12:02 PM EDT by Hilaria Jaimes      97.3  17  sinus bradycardia continues HR 54-68  146/68  94%ra    (X) * Cardiovascular status acceptable    3/30/2021 12:02 PM EDT by Hilaria nuñez dc per Cardiology    (X) * Cardiac inflammation absent or controlled    3/30/2021 12:02 PM EDT by Haleigh Noyola Echo today:  Normal left ventricular systolic function w/ EF   02-55%  Grade I diastolic dysfunction with normal filling pressure  Compared to  5- no changes noted in left ventricular function.   Mild mitral and aortic regurgitation.    (X) * General Discharge Criteria met    Interventions    (X) * Intake acceptable    ( ) * No inpatient interventions needed    3/30/2021 12:02 PM EDT by Hilaria Jaimes      asa 81mg qd, ceftin 250mg qd, mag oxide 400mg bid, prednisone 10mg qd, protonix 40mg qd, carafate qid, flomax 0.4mg qd, bevespi inhaler bid  lopressor decreased to 12.5mg bid per Cardiology    * Milestone

## 2021-03-31 LAB
EKG ATRIAL RATE: 59 BPM
EKG DIAGNOSIS: NORMAL
EKG P AXIS: 74 DEGREES
EKG P-R INTERVAL: 196 MS
EKG Q-T INTERVAL: 486 MS
EKG QRS DURATION: 164 MS
EKG QTC CALCULATION (BAZETT): 481 MS
EKG R AXIS: -54 DEGREES
EKG T AXIS: -2 DEGREES
EKG VENTRICULAR RATE: 59 BPM

## 2021-03-31 PROCEDURE — 97161 PT EVAL LOW COMPLEX 20 MIN: CPT

## 2021-03-31 PROCEDURE — 97110 THERAPEUTIC EXERCISES: CPT

## 2021-03-31 PROCEDURE — 1200000000 HC SEMI PRIVATE

## 2021-03-31 PROCEDURE — 93005 ELECTROCARDIOGRAM TRACING: CPT | Performed by: HOSPITALIST

## 2021-03-31 PROCEDURE — 2580000003 HC RX 258: Performed by: HOSPITALIST

## 2021-03-31 PROCEDURE — 93010 ELECTROCARDIOGRAM REPORT: CPT | Performed by: INTERNAL MEDICINE

## 2021-03-31 PROCEDURE — 6360000002 HC RX W HCPCS: Performed by: HOSPITALIST

## 2021-03-31 PROCEDURE — 6370000000 HC RX 637 (ALT 250 FOR IP): Performed by: HOSPITALIST

## 2021-03-31 PROCEDURE — 97165 OT EVAL LOW COMPLEX 30 MIN: CPT

## 2021-03-31 PROCEDURE — 97116 GAIT TRAINING THERAPY: CPT

## 2021-03-31 PROCEDURE — 99222 1ST HOSP IP/OBS MODERATE 55: CPT | Performed by: INTERNAL MEDICINE

## 2021-03-31 PROCEDURE — 6370000000 HC RX 637 (ALT 250 FOR IP): Performed by: NURSE PRACTITIONER

## 2021-03-31 PROCEDURE — 94640 AIRWAY INHALATION TREATMENT: CPT

## 2021-03-31 PROCEDURE — 97530 THERAPEUTIC ACTIVITIES: CPT

## 2021-03-31 RX ORDER — AMLODIPINE BESYLATE 2.5 MG/1
2.5 TABLET ORAL DAILY
Status: DISCONTINUED | OUTPATIENT
Start: 2021-03-31 | End: 2021-04-06 | Stop reason: HOSPADM

## 2021-03-31 RX ADMIN — TIMOLOL MALEATE 1 DROP: 5 SOLUTION OPHTHALMIC at 09:39

## 2021-03-31 RX ADMIN — ASPIRIN 81 MG: 81 TABLET, COATED ORAL at 09:36

## 2021-03-31 RX ADMIN — GLYCOPYRROLATE AND FORMOTEROL FUMARATE 2 PUFF: 9; 4.8 AEROSOL, METERED RESPIRATORY (INHALATION) at 08:15

## 2021-03-31 RX ADMIN — TAMSULOSIN HYDROCHLORIDE 0.4 MG: 0.4 CAPSULE ORAL at 09:34

## 2021-03-31 RX ADMIN — PREDNISONE 10 MG: 10 TABLET ORAL at 09:35

## 2021-03-31 RX ADMIN — ATORVASTATIN CALCIUM 10 MG: 10 TABLET, FILM COATED ORAL at 09:38

## 2021-03-31 RX ADMIN — GLYCOPYRROLATE AND FORMOTEROL FUMARATE 2 PUFF: 9; 4.8 AEROSOL, METERED RESPIRATORY (INHALATION) at 19:16

## 2021-03-31 RX ADMIN — ENOXAPARIN SODIUM 40 MG: 40 INJECTION SUBCUTANEOUS at 09:38

## 2021-03-31 RX ADMIN — FAMOTIDINE 20 MG: 20 TABLET ORAL at 09:38

## 2021-03-31 RX ADMIN — AMLODIPINE BESYLATE 2.5 MG: 2.5 TABLET ORAL at 16:51

## 2021-03-31 RX ADMIN — MAGNESIUM GLUCONATE 500 MG ORAL TABLET 400 MG: 500 TABLET ORAL at 09:36

## 2021-03-31 RX ADMIN — TIMOLOL MALEATE 1 DROP: 5 SOLUTION OPHTHALMIC at 19:35

## 2021-03-31 RX ADMIN — Medication 10 ML: at 19:35

## 2021-03-31 NOTE — PROGRESS NOTES
Physical Therapy  Facility/Department: [facility] C5 - MED SURG/ORTHO  Daily Treatment Note  NAME: Abebe Stoll  : 10/29/1925  MRN: [id number]    Date of Service: 3/31/2021    Discharge Recommendations:  24 hour supervision or assist, Home with Home health PT   PT Equipment Recommendations  Equipment Needed: No(Pt reports own RW)    Assessment   Body structures, Functions, Activity limitations: Decreased functional mobility ; Decreased strength;Decreased safe awareness;Decreased endurance;Decreased balance  Assessment: Pt is a 80year old male who presents to [facility] following syncope and collapse. PTA, pt reports he lives alone and has family near by and RN 3x/week; independent with quad cane for functional mobility. Currently, pt requires CGA for functional mobility, demonstrates improved balance with use of RW vs quad cane. Pt may benefit from continued skilled PT to address deficits listed above and maximize independence in d/c environment. Recommend Home with inital 24 hr sup/assist and HHPT  Treatment Diagnosis: impaired functional mobility  Prognosis: Good  Decision Making: Low Complexity  PT Education: Goals; General Safety;Gait Training;PT Role;Plan of Care;Transfer Training;Disease Specific Education  Patient Education: Pt edu on safety with RW vs quad cane- verbalizes understanding, but will benefit from reinforcement  Barriers to Learning: short term memory  REQUIRES PT FOLLOW UP: Yes  Activity Tolerance  Activity Tolerance: Patient Tolerated treatment well  Activity Tolerance: BP after ambulation 172/91, HR 62 bpm, SpO2 96% on RA     Patient Diagnosis(es): The primary encounter diagnosis was Syncope and collapse. A diagnosis of Hypokalemia was also pertinent to this visit.      has a past medical history of Cancer ([address] GERD (gastroesophageal reflux disease), Glaucoma, History of stomach ulcers, Hyperlipidemia, Hypertension, Nausea alone, Pneumonia, Psychiatric problem, S/P radiation therapy, Seasonal allergies, and Staphylococcal arthritis of left knee ([facility] [address] has a past surgical history that includes Appendectomy; Cholecystectomy; skin biopsy; other surgical history (7/27/2012); Colonoscopy (10/20/1999); Colonoscopy (01/16/2002); Upper gastrointestinal endoscopy (04/30/2004); Upper gastrointestinal endoscopy (12/12/2007); Prostate biopsy (2007); Upper gastrointestinal endoscopy (4/19/2013); Cataract removal with implant (Right, 12/01/2016); Cataract removal with implant (Left, 12/13/2016); other surgical history (Left, 05/09/2017); Wrist surgery (Right, 05/12/2017); knee surgery (Right, 05/12/2017); eye surgery; and Endoscopy, colon, diagnostic. Restrictions  Restrictions/Precautions  Restrictions/Precautions: General Precautions, Fall Risk  Position Activity Restriction  Other position/activity restrictions: up with assistance  Subjective   General  Chart Reviewed: Yes  Response To Previous Treatment: Not applicable  Family / Caregiver Present: No  Referring Practitioner: Radha Laguerre MD  Subjective  Subjective: Pt supine in bed upon arrival. Agreeble to PT eval  General Comment  Comments: RN cleared pt for therapy  Pain Screening  Patient Currently in Pain: Denies  Vital Signs  Patient Currently in Pain: Denies       Orientation  Orientation  Overall Orientation Status: Within Functional Limits  Cognition      Objective   Bed mobility  Supine to Sit: Stand by assistance  Sit to Supine: Unable to assess(Pt in chair at end of session)  Scooting: Stand by assistance  Transfers  Sit to Stand: Contact guard assistance  Stand to sit: Contact guard assistance  Bed to Chair: Contact guard assistance  Comment: From EOB with quad cane; from chair to RW  Ambulation  Ambulation?: Yes  More Ambulation?: Yes  Ambulation 1  Surface: level tile  Device: Small Filemon Thayer  Assistance: Contact guard assistance  Gait Deviations: Slow Afia; Increased RYAN; Decreased step length;Decreased step height  Distance: 30'  Comments: CGA for balance, pt only contacting 1 point of quad cane during ambulation  Ambulation 2  Surface - 2: level tile  Device 2: Rolling Walker  Assistance 2: Contact guard assistance  Gait Deviations: Increased RYAN; Decreased step length;Decreased step height  Distance: 40'  Comments: Pt demo smoother gait pattern and decreased sway with use of RW. RW adjusted for height of pt     Balance  Sitting - Static: Good  Sitting - Dynamic: Good;-  Standing - Static: Fair  Standing - Dynamic: Fair  Comments: Sitting Balance: SBA sitting EOB; Standing Balance: CGA with RW/quad cane  Exercises  Hip Flexion: Bx15 seated  Hip Abduction: Bx15 seated  Knee Long Arc Quad: Bx15 seated  Ankle Pumps: Bx15 seated  Upper Extremity: See OT noted for details  Comments: Verbal cues for sequencing and pacing                        AM-PAC Score     AM-PAC Inpatient Mobility without Stair Climbing Raw Score : 16 (03/31/21 1214)  AM-PAC Inpatient without Stair Climbing T-Scale Score : 45.54 (03/31/21 1214)  Mobility Inpatient CMS 0-100% Score: 40.64 (03/31/21 1214)  Mobility Inpatient without Stair CMS G-Code Modifier : CK (03/31/21 1214)       Goals  Short term goals  Time Frame for Short term goals: 7 days (4/7/2021)  Short term goal 1: Pt will perform bed mobility independently  Short term goal 2: Pt will perform transfers with LRAD and supervision  Short term goal 3: Pt will ambulate 76' with LRAD and supervision  Short term goal 4: Pt will perform 15-20 reps BLE exercises to improve BLE strength  Patient Goals   Patient goals : \"to walk by myself\"    Plan    Plan  Times per week: 3-5x/week  Current Treatment Recommendations: Strengthening, Balance Training, Functional Mobility Training, Transfer Training, Gait Training, Endurance Training, Neuromuscular Re-education, Safety Education & Training, Patient/Caregiver Education & Training  Safety Devices  Type of devices:  All fall risk precautions in place, Left in chair, Call light within reach, Chair alarm in place, Gait belt, Patient at risk for falls  Restraints  Initially in place: No     Therapy Time   Individual Concurrent Group Co-treatment   Time In 1139         Time Out 1213         Minutes 34         Timed Code Treatment Minutes: 24 Minutes(10 min eval)     If pt is unable to be seen after this session, please let this note serve as discharge summary. Please see case management note for discharge disposition. Thank you.     Salvatore Boyd, PT

## 2021-03-31 NOTE — PROGRESS NOTES
Occupational Therapy   Occupational Therapy Initial Assessment/Treatment   Date: 3/31/2021   Patient Name: Romie Dietz  MRN: [id number]     : 10/29/1925    Date of Service: 3/31/2021    Discharge Recommendations:  24 hour supervision or assist(initially)  OT Equipment Recommendations  Equipment Needed: No    Assessment   Performance deficits / Impairments: Decreased high-level IADLs;Decreased functional mobility ; Decreased balance;Decreased vision/visual deficit; Decreased ADL status; Decreased strength    Assessment: Pt is 80 y.o. male with syncope. Pt was previously independent with all ADLs and recieves assistance from caregiver 3x week for high-level IADLs. Prior to syncope, pt used quad cane for functional mobility in home and community. Pt currently requires CGA for grooming ADLs and standing balance task and requires SBA for sitting balance at EOB. Pt trialed RW and demo'd increased stability and speed with functional mobility. Pt would benefit from continued OT services while in acute care to improve strength and endurance. Prognosis: Good  Decision Making: Low Complexity  OT Education: OT Role;Plan of Care;Home Exercise Program;Precautions;Transfer Training;ADL Adaptive Strategies  Patient Education: importance of continued activity  REQUIRES OT FOLLOW UP: Yes  Activity Tolerance  Activity Tolerance: Patient Tolerated treatment well  Safety Devices  Safety Devices in place: Yes  Type of devices: Call light within reach; Chair alarm in place; Left in chair;Gait belt;Nurse notified           Patient Diagnosis(es): The primary encounter diagnosis was Syncope and collapse. A diagnosis of Hypokalemia was also pertinent to this visit.      has a past medical history of Cancer ([facility] [address] GERD (gastroesophageal reflux disease), Glaucoma, History of stomach ulcers, Hyperlipidemia, Hypertension, Nausea alone, Pneumonia, Psychiatric problem, S/P radiation therapy, Seasonal allergies, and Staphylococcal arthritis of left knee ([facility] [address] has a past surgical history that includes Appendectomy; Cholecystectomy; skin biopsy; other surgical history (7/27/2012); Colonoscopy (10/20/1999); Colonoscopy (01/16/2002); Upper gastrointestinal endoscopy (04/30/2004); Upper gastrointestinal endoscopy (12/12/2007); Prostate biopsy (2007); Upper gastrointestinal endoscopy (4/19/2013); Cataract removal with implant (Right, 12/01/2016); Cataract removal with implant (Left, 12/13/2016); other surgical history (Left, 05/09/2017); Wrist surgery (Right, 05/12/2017); knee surgery (Right, 05/12/2017); eye surgery; and Endoscopy, colon, diagnostic.            Restrictions  Restrictions/Precautions  Restrictions/Precautions: General Precautions, Fall Risk  Position Activity Restriction  Other position/activity restrictions: up with assistance    Subjective   General  Chart Reviewed: Yes  Patient assessed for rehabilitation services?: Yes  Family / Caregiver Present: No  Referring Practitioner: Vilma Jamison MD  Diagnosis: syncope  Subjective  Subjective: Pt pleasant and agreeable to therapy  General Comment  Comments: RN approved therapy  Patient Currently in Pain: Denies  Vital Signs  Pulse: 61  Heart Rate Source: Monitor  BP: (!) 169/77  BP Location: Right upper arm  Patient Position: Semi fowlers  Patient Currently in Pain: Denies  Oxygen Therapy  SpO2: 96 %     Social/Functional History  Social/Functional History  Lives With: Alone  Type of Home: House  Home Layout: One level  Home Access: Ramped entrance  Bathroom Shower/Tub: Tub/Shower unit, Walk-in shower  Bathroom Toilet: Handicap height  Bathroom Equipment: Built-in shower seat, Grab bars in shower, Grab bars around toilet, 3-in-1 commode  Home Equipment: 4 wheeled walker, Quad cane, Rolling walker  Receives Help From: Other (comment)  ADL Assistance: Independent  Ambulation Assistance: Independent  Active : Yes  Occupation: Retired  Additional Comments: family lives close by, no other recent falls       Objective   Vision: Impaired  Vision Exceptions: Wears glasses at all times  Hearing: Exceptions to [facility]  Hearing Exceptions: Hard of hearing/hearing concerns    Orientation  Overall Orientation Status: Within Functional Limits  Observation/Palpation  Posture: Good  Balance  Sitting Balance: Stand by assistance(seated EOB)  Standing Balance: Contact guard assistance  Standing Balance  Time: ~3min  Activity: hair combing standing at sink  Functional Mobility  Functional - Mobility Device: Cane  Activity: To/from bathroom  Assist Level: Contact guard assistance  Functional Mobility Comments: trialed RW; pt demo'd increased speed and stability with RW and reported that he has one at home  ADL  Grooming: Contact guard assistance(hair combing standing at sink; face washing sitting in chair)  Additional Comments: pt declined further adls  Tone RUE  RUE Tone: Normotonic  Tone LUE  LUE Tone: Normotonic  Coordination  Movements Are Fluid And Coordinated: Yes     Bed mobility  Supine to Sit: Stand by assistance  Transfers  Sit to stand: Contact guard assistance  Stand to sit: Contact guard assistance  Transfer Comments: with quad cane     Cognition  Overall Cognitive Status: Exceptions  Arousal/Alertness: Appropriate responses to stimuli  Following Commands:  Follows multistep commands with repitition  Attention Span: Attends with cues to redirect  Memory: Decreased short term memory  Problem Solving: Assistance required to generate solutions           Type of ROM/Therapeutic Exercise  Type of ROM/Therapeutic Exercise: AROM  Comment: B UE  Exercises  Shoulder Depression: x10  Shoulder Elevation: x10  Horizontal ABduction: x10  Horizontal ADduction: x10  Elbow Flexion: x10  Elbow Extension: x10  Supination: x10  Pronation: x10  Grasp/Release: x10     LUE AROM (degrees)  LUE AROM : WFL  RUE AROM (degrees)  RUE AROM : WFL  LUE Strength  Gross LUE Strength: WFL  RUE Strength  Gross RUE Strength: [facility] Plan   Plan  Times per week: 3-5x/wk  Current Treatment Recommendations: Strengthening, Endurance Training, Balance Training, Functional Mobility Training, Safety Education & Training, Self-Care / ADL    AM-PAC Score        AM-PAC Inpatient Daily Activity Raw Score: 20 (03/31/21 1324)  AM-PAC Inpatient ADL T-Scale Score : 42.03 (03/31/21 1324)  ADL Inpatient CMS 0-100% Score: 38.32 (03/31/21 1324)  ADL Inpatient CMS G-Code Modifier : Velvet Milli (03/31/21 1324)    Goals  Short term goals  Time Frame for Short term goals: One week (4/6/2021)  Short term goal 1: Pt will complete 15 reps of BUE exercises to increase strength to engage in ADLs (by 4/3/2021)  Short term goal 2: Pt will complete grooming activity at sink for 5 minutes with supervision  Short term goal 3: Pt will complete toilet transfer with mod I  Patient Goals   Patient goals : \"to go home\"       Therapy Time   Individual Concurrent Group Co-treatment   Time In 1138         Time Out 1213         Minutes 35         Timed Code Treatment Minutes: 25 Minutes(10 minutes for evaluation)       LILIANA Bañuelos/L      If pt is unable to be seen after this session, please let this note serve as discharge summary. Please see case management note for discharge disposition. Thank you.

## 2021-03-31 NOTE — CONSULTS
[facility] Nurse  Consult Note       NAME:  Candida Stoll  MEDICAL RECORD NUMBER:  [id number]  AGE: 80 y.o. GENDER: male  : 10/29/1925  TODAY'S DATE:  3/31/2021    Subjective: I use to go to my podiatrist faithfully but they wouldn't trim my nails or cut anything off. Reason for WOCN Evaluation and Assessment: Bilateral Feet Plantar - callous with wound underneith      Andreas Stoll is a 80 y.o. male referred by:   [] Physician  [x] Nursing  [] Other:     Wound Identification:  Wound Type: pressure  Contributing Factors: chronic pressure    Wound History: Anne Marie Palumbo is a 80 y.o. male who presented to the ED to be evaluated for syncope and collapse less than 1 week following recent hospitalization for trigeminy, dyspnea. During recent hospitalization the patient was treated with Ceftin for possible bronchopneumonia, and he underwent telemetry monitoring and TTE to evaluate his cardiac palpitations. Trigeminy tracing pattern was identified, and cardiology consultation initiated low-dose metoprolol 12.5 mg twice daily. The patient was noted to be mildly bradycardic in the upper 50s following initiation of said medication, however he tolerated it without difficulty. Review of his recent hospital record as well as discharge summary reveals that while he was admitted, his Lasix and HCTZ was held due to concerns of overdiuresis. Discharge instructions stated that he was to resume both medications which is likely resulted in his current hyponatremia and hypokalemia at 131/3.3 respectively. Upon arrival to the ED earlier this morning EKG was obtained and notable for sinus bradycardia with first-degree AV block, RBBB, and left anterior fascicular block. CXR was notable for bibasilar hypoaeration but otherwise interpreted as normal.  Patient will be admitted for syncope and collapse with notable bradycardia with bifascicular block.   Current Wound Care Treatment: Bilateral Feet Plantar - betadine    Patient Goal of Care:  [x] Wound Healing  [] Odor Control  [] Palliative Care  [] Pain Control   [] Other:         PAST MEDICAL HISTORY        Diagnosis Date    Cancer [facility])     prostate  HAD SEED IMPLANTS    GERD (gastroesophageal reflux disease)     Glaucoma     History of stomach ulcers 1993+/-    Hyperlipidemia     Hypertension     Nausea alone     Pneumonia     1944    Psychiatric problem     anxiety    S/P radiation therapy 2007    Prostate seeds    Seasonal allergies     Staphylococcal arthritis of left knee ([address] 5/9/2017       PAST SURGICAL HISTORY    Past Surgical History:   Procedure Laterality Date    APPENDECTOMY      CATARACT REMOVAL WITH IMPLANT Right 12/01/2016    CATARACT REMOVAL WITH IMPLANT Left 12/13/2016    CHOLECYSTECTOMY      COLONOSCOPY  10/20/1999    polyp    COLONOSCOPY  01/16/2002    ?     ENDOSCOPY, COLON, DIAGNOSTIC      EYE SURGERY      cataract implants bilat eyes    KNEE SURGERY Right 05/12/2017    video arthroscopy and I&D right knee, partial medial and lateral meniscectomy right knee    OTHER SURGICAL HISTORY  7/27/2012    excision squamouscell carcinoma     right cheek     OTHER SURGICAL HISTORY Left 05/09/2017    Irrigation and Drainage of left knee    PROSTATE BIOPSY  2007    Cancer treated with seeds    SKIN BIOPSY      cyst    UPPER GASTROINTESTINAL ENDOSCOPY  04/30/2004    Hiatal Hernia, Gastritis    UPPER GASTROINTESTINAL ENDOSCOPY  12/12/2007    Hiatal Hernia, Gastritis    UPPER GASTROINTESTINAL ENDOSCOPY  4/19/2013    gastritis    WRIST SURGERY Right 05/12/2017    I&D right wrist       FAMILY HISTORY    Family History   Problem Relation Age of Onset    Cancer Daughter         ovarian    Stroke Mother     High Blood Pressure Father     Other Father         circulation issues       SOCIAL HISTORY    Social History     Tobacco Use    Smoking status: Former Smoker     Years: 38.00     Types: Cigars    Smokeless tobacco: Never Used   Substance Use Topics    Alcohol use: No    Drug use: No       ALLERGIES    Allergies   Allergen Reactions    Neomycin-Polymyxin-Gramicidin     Neosporin [Bacitracin-Neomycin-Polymyxin] Rash       MEDICATIONS    No current facility-administered medications on file prior to encounter. Current Outpatient Medications on File Prior to Encounter   Medication Sig Dispense Refill    metoprolol tartrate (LOPRESSOR) 25 MG tablet Take 0.5 tablets by mouth 2 times daily 60 tablet 3    magnesium oxide (MAG-OX) 400 (241.3 Mg) MG TABS tablet Take 1 tablet by mouth daily 30 tablet 0    hydroCHLOROthiazide (HYDRODIURIL) 25 MG tablet Take 25 mg by mouth daily      colchicine (COLCRYS) 0.6 MG tablet       predniSONE (DELTASONE) 10 MG tablet       naproxen (NAPROSYN) 500 MG tablet TAKE 1 TABLET TWICE A DAY  (WITH MEALS)      albuterol sulfate HFA (PROAIR HFA) 108 (90 Base) MCG/ACT inhaler Inhale 2 puffs into the lungs every 4 hours as needed      acetaminophen (TYLENOL) 325 MG tablet Take 650 mg by mouth every 6 hours as needed for Pain      cefUROXime (CEFTIN) 250 MG tablet       ketoconazole (NIZORAL) 2 % cream Apply topically daily. 30 g 1    sennosides-docusate sodium (SENOKOT-S) 8.6-50 MG tablet Take 2 tablets by mouth daily as needed for Constipation      Furosemide (LASIX PO) Take 40 mg by mouth       aspirin 81 MG EC tablet Take 81 mg by mouth daily.  simvastatin (ZOCOR) 10 MG tablet Take 10 mg by mouth nightly.  timolol (TIMOPTIC) 0.5 % ophthalmic solution Place 1 drop into both eyes 2 times daily       umeclidinium-vilanterol (ANORO ELLIPTA) 62.5-25 MCG/INH AEPB inhaler Inhale 1 puff into the lungs daily      sodium chloride (OCEAN, BABY AYR) 0.65 % nasal spray 2 sprays by Nasal route as needed for Congestion (every  2 hours prn)      promethazine (PHENERGAN) 25 MG tablet Take 0.5-1 tablets by mouth every 6 hours as needed for Nausea.  30 tablet 0    tamsulosin (FLOMAX) 0.4 MG capsule Take 0.4 mg by mouth daily. Objective: A/O; lying in bed; granddaughter at bedside    BP (!) 158/76   Pulse 60   Temp 98 °F (36.7 °C) (Oral)   Resp 18   Wt 195 lb (88.5 kg)   SpO2 94%   BMI 29.65 kg/m²     LABS:  WBC:    Lab Results   Component Value Date    WBC 7.4 03/30/2021     H/H:    Lab Results   Component Value Date    HGB 13.8 03/30/2021    HCT 40.0 03/30/2021     PTT:    Lab Results   Component Value Date    APTT 32.6 05/11/2017   [APTT}  PT/INR:    Lab Results   Component Value Date    PROTIME 12.9 03/31/2013    INR 1.14 03/31/2013     HgBA1c:  No results found for: LABA1C      Patient Active Problem List   Diagnosis Code    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 ([facility] [address] G40.409    Near syncope R55    Bradycardia R00.1    Nausea R11.0    Weight loss R63.4    EKG abnormalities R94.31    HTN (hypertension) I10    Hiatal hernia K44.9    PUD (peptic ulcer disease) K27.9    Localized osteoarthrosis, lower leg M17.10    Knee pain, bilateral M25.561, M25.562    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 116    Thrombophlebitis arm I80.8    Left arm cellulitis L03.114    Palpitations R00.2    PVC's (premature ventricular contractions) I49.3    SOB (shortness of breath) R06.02    Syncope and collapse R55    Hyponatremia E87.1    Hypokalemia E87.6       Measurements:  L Foot Plantar:    Intact callous upper edge darker in color; measures 1.4 cm x 1 cm x 0 cm    R Foot Plantar:      Intact callous with dark center; measures 1 cm x 0.5 cm x 0 cm      Louie Risk Score: Louie Scale Score: 19    Response to treatment:  Well tolerated by patient.      Pain Assessment:  Severity:  0 / 10  Quality of pain: N/A  Wound Pain Timing/Severity: none  Premedicated: No    Plan   Plan of Care:    Recommendation: Bilateral Feet Plantar - clean with foamy cleanser; paint callous with betadine daily; apply moisturizing lotion to feet and lower legs daily  Wound Care to sign off, please reconsult for changes or deterioration [phone]    Recommended to patient and granddaughter to follow up with a podiatrist to have the callouses removed. List placed in [address] [place] [address] for patient and family. Specialty Bed Required : No   [] Low Air Loss   [] Pressure Redistribution  [] Fluid Immersion  [] Bariatric  [] Total Pressure Relief  [] Other:     Current Diet: DIET CARDIAC; Carb Control: 4 carb choices (60 gms)/meal; No Added Salt (3-4 GM);  Daily Fluid Restriction: 2000 ml  Dietician consult:  No    Discharge Plan:  Placement for patient upon discharge: home with support    Patient appropriate for Outpatient [address]: No    Referrals:  [x]   [] [facility]  [] Supplies  [] Other    Patient/Caregiver Teaching:  Level of patient/caregiver understanding able to:   [x] Indicates understanding       [] Needs reinforcement  [] Unsuccessful      [] Verbal Understanding  [] Demonstrated understanding       [] No evidence of learning  [] Refused teaching         [] N/A       Electronically signed by Demario Narayanan RN, CWOCN on 3/31/2021 at 10:46 AM

## 2021-03-31 NOTE — PROGRESS NOTES
(hypertension) [I10]    Hiatal hernia [K44.9]    PUD (peptic ulcer disease) [K27.9]     Syncope and collapse   -Suspect may be secondary to recent addition of Lopressor 12.5 mg twice daily, which was started to address his trigeminy or perhaps 2/2  Resuming lasix and hctz as OP setting, these were held while IP w/o adverse effects. PT was given IVF in the ED   -Beta-blocker held  -consult cardiology for further recommendations  -Fall and seizure precautions in place  -Orthostatic vital signs negative  -Strict I's and O's  -ECHO completed 3/24/2021   -PT/OT to evaluate and treat  - tele      Hyponatremia/hypokalemia  -Patient with normal sodium and potassium levels just 7 days prior to admission  -Review of epic medical record reveals that both his HCTZ and Lasix were HELD during his recent hospital stay with no adverse effects; review of medications for discharge reveals that BOTH diuretics were resumed after leaving  - IVF given   - replace and monitor     COPD  -Patient stable on room air  -Recommend incentive spirometry every 4 hours while awake  -Continue Anoro Ellipta inhaler per home dosage  -Patient on prednisone 10 mg daily which has been confirmed with his PCP and family members; continue at current dosage for now     History of PUD  -Continue ASA but not sure enteric-coated  -NSAIDs currently held as they may be contributing to his hyponatremia  -Initiate H2 blocker Pepcid once daily (rather than PPI as latter agent may worsen hyponatremia)    HTN- sub therapeutic  - added 2.5 Norvasc monitor         DVT Prophylaxis: lovenox   Diet: DIET CARDIAC; Carb Control: 4 carb choices (60 gms)/meal; No Added Salt (3-4 GM);  Daily Fluid Restriction: 2000 ml  Code Status: Full Code    PT/OT Eval Status: ordered rec's for HHT     Dispo - pending toleration off meds and clinical course     Kylie Bauer, APRN - CNP

## 2021-03-31 NOTE — CARE COORDINATION
CASE MANAGEMENT INITIAL ASSESSMENT      Reviewed chart and completed assessment with:patient and daughter via phone  Explained Case Management role/services. Primary contact information:see below  Health Care Decision Maker :   Primary Decision Maker: Solitario Hoyos - [phone]    Primary Decision Maker: Solitario Hoyos - [phone]    Secondary Decision Maker: Kylah Martinez - [phone]      Can this person be reached and be able to respond quickly, such as within a few minutes or hours? Yes  Admit date/status:03/30/2021  Diagnosis:Syncope and collapse   Is this a Readmission?:  No      Insurance:[employer] Medicare   Precert required for SNF: Yes       3 night stay required: No    Living arrangements, Adls, care needs, prior to admission:Pt lives alone in a ranch home w/ a ramp. Pt has private duty aide who sees him 3x week for assistance w/housekeeping. Transportation:private     Durable Medical Equipment at home:  Walker_RW_Cane_x_RTS_x_ BSC__Shower Chair_x_  02__ HHN__ CPAP__  BiPap__  Hospital Bed__ W/C__x_ Other__grab bars, hoyer________    Services in the home and/or outpatient, prior to admission:private duty aide 3x week      PT/OT recs:pending    Hospital Exemption Notification (HEN):not initiated    Barriers to discharge:none    Plan/comments:Pt plans to d/c home when medically stable. CM spoke to daughter who would be in agreement to [place] [id number] and had no preference. CM will continue to follow for needs, including HHC.   Sole Ritchie RN       ECOC on chart for MD signature

## 2021-03-31 NOTE — CONSULTS
listed in nursing record and/or below  Prior to Admission medications    Medication Sig Start Date End Date Taking? Authorizing Provider   metoprolol tartrate (LOPRESSOR) 25 MG tablet Take 0.5 tablets by mouth 2 times daily 3/24/21  Yes Arcelia Day MD   magnesium oxide (MAG-OX) 400 (241.3 Mg) MG TABS tablet Take 1 tablet by mouth daily 3/25/21  Yes Arcelia Day MD   hydroCHLOROthiazide (HYDRODIURIL) 25 MG tablet Take 25 mg by mouth daily 1/22/21  Yes Historical Provider, MD   colchicine (COLCRYS) 0.6 MG tablet  2/3/21  Yes Historical Provider, MD   predniSONE (DELTASONE) 10 MG tablet  2/15/21  Yes Historical Provider, MD   naproxen (NAPROSYN) 500 MG tablet TAKE 1 TABLET TWICE A DAY  (WITH MEALS) 12/31/20  Yes Historical Provider, MD   albuterol sulfate HFA (PROAIR HFA) 108 (90 Base) MCG/ACT inhaler Inhale 2 puffs into the lungs every 4 hours as needed 3/18/21  Yes Historical Provider, MD   acetaminophen (TYLENOL) 325 MG tablet Take 650 mg by mouth every 6 hours as needed for Pain   Yes Historical Provider, MD   cefUROXime (CEFTIN) 250 MG tablet  3/18/21  Yes Historical Provider, MD   ketoconazole (NIZORAL) 2 % cream Apply topically daily. 5/23/17  Yes Carlito Gibbs MD   sennosides-docusate sodium (SENOKOT-S) 8.6-50 MG tablet Take 2 tablets by mouth daily as needed for Constipation 5/16/17  Yes Gretchen Kang MD   Furosemide (LASIX PO) Take 40 mg by mouth    Yes Historical Provider, MD   aspirin 81 MG EC tablet Take 81 mg by mouth daily. Yes Historical Provider, MD   simvastatin (ZOCOR) 10 MG tablet Take 10 mg by mouth nightly.      Yes Historical Provider, MD   timolol (TIMOPTIC) 0.5 % ophthalmic solution Place 1 drop into both eyes 2 times daily    Yes Historical Provider, MD   umeclidinium-vilanterol (ANORO ELLIPTA) 62.5-25 MCG/INH AEPB inhaler Inhale 1 puff into the lungs daily 3/18/21   Historical Provider, MD   sodium chloride (OCEAN, BABY AYR) 0.65 % nasal spray 2 sprays by Nasal route as needed for Congestion (every  2 hours prn)    Historical Provider, MD   promethazine (PHENERGAN) 25 MG tablet Take 0.5-1 tablets by mouth every 6 hours as needed for Nausea. 4/20/13   Alysha Guerrero MD   tamsulosin (FLOMAX) 0.4 MG capsule Take 0.4 mg by mouth daily. Historical Provider, MD        CURRENT Medications:  acetaminophen (TYLENOL) tablet 650 mg, Q6H PRN  aspirin EC tablet 81 mg, Daily  glycopyrrolate-formoterol (BEVESPI) 9-4.8 MCG/ACT inhaler 2 puff, BID  timolol (TIMOPTIC) 0.5 % ophthalmic solution 1 drop, BID  tamsulosin (FLOMAX) capsule 0.4 mg, Daily  sodium chloride (OCEAN, BABY AYR) 0.65 % nasal spray 2 spray, PRN  atorvastatin (LIPITOR) tablet 10 mg, Daily  sennosides-docusate sodium (SENOKOT-S) 8.6-50 MG tablet 2 tablet, Daily PRN  predniSONE (DELTASONE) tablet 10 mg, Daily  [Held by provider] metoprolol tartrate (LOPRESSOR) tablet 12.5 mg, BID  magnesium oxide (MAG-OX) tablet 400 mg, Daily  sodium chloride flush 0.9 % injection 10 mL, 2 times per day  sodium chloride flush 0.9 % injection 10 mL, PRN  0.9 % sodium chloride infusion, PRN  potassium chloride (KLOR-CON M) extended release tablet 40 mEq, PRN    Or  potassium bicarb-citric acid (EFFER-K) effervescent tablet 40 mEq, PRN    Or  potassium chloride 10 mEq/100 mL IVPB (Peripheral Line), PRN  magnesium sulfate 2000 mg in 50 mL IVPB premix, PRN  enoxaparin (LOVENOX) injection 40 mg, Daily  promethazine (PHENERGAN) tablet 12.5 mg, Q6H PRN    Or  ondansetron (ZOFRAN) injection 4 mg, Q6H PRN  senna (SENOKOT) tablet 8.6 mg, Daily PRN  acetaminophen (TYLENOL) tablet 650 mg, Q6H PRN    Or  acetaminophen (TYLENOL) suppository 650 mg, Q6H PRN  famotidine (PEPCID) tablet 20 mg, Daily        Allergies:  Neomycin-polymyxin-gramicidin and Neosporin [bacitracin-neomycin-polymyxin]     Review of Systems:   A 14 point review of symptoms completed.  Pertinent positives identified in the HPI, all other review of symptoms negative as below.      Objective   PHYSICAL EXAM:    Vitals:    03/31/21 0335   BP: (!) 146/74   Pulse: 62   Resp: 18   Temp: 97.7 °F (36.5 °C)   SpO2: 93%    Weight: 195 lb (88.5 kg)         General Appearance:  Alert, cooperative, no distress, appears stated age, [place]   Head:  Normocephalic, without obvious abnormality, atraumatic   Eyes:  PERRL, conjunctiva/corneas clear   Nose: Nares normal, no drainage or sinus tenderness   Throat: Lips, mucosa, and tongue normal   Neck: Supple, symmetrical, trachea midline, no adenopathy, thyroid: not enlarged, symmetric, no tenderness/mass/nodules, no carotid bruit or JVD   Lungs:   Clear to auscultation bilaterally, respirations unlabored   Chest Wall:  No deformity or tenderness   Heart:  Regular rate and rhythm, S1, S2 normal, no murmur, rub or gallop   Abdomen:   Soft, non-tender, bowel sounds active all four quadrants,  no masses, no organomegaly   Extremities: Extremities normal, atraumatic, no cyanosis or edema   Pulses: 2+ and symmetric   Skin: Skin color, texture, turgor normal, no rashes or lesions   Pysch: Normal mood and affect   Neurologic: Normal gross motor and sensory exam.         Labs   CBC:   Lab Results   Component Value Date    WBC 7.4 03/30/2021    RBC 4.30 03/30/2021    HGB 13.8 03/30/2021    HCT 40.0 03/30/2021    MCV 93.1 03/30/2021    RDW 14.1 03/30/2021     03/30/2021     CMP:  Lab Results   Component Value Date     03/30/2021    K 3.3 03/30/2021    CL 91 03/30/2021    CO2 31 03/30/2021    BUN 23 03/30/2021    CREATININE 0.7 03/30/2021    GFRAA >60 03/30/2021    GFRAA >60 04/19/2013    AGRATIO 1.6 03/30/2021    LABGLOM >60 03/30/2021    GLUCOSE 135 03/30/2021    PROT 6.4 03/30/2021    PROT 7.1 09/24/2012    CALCIUM 9.3 03/30/2021    BILITOT 0.6 03/30/2021    ALKPHOS 42 03/30/2021    AST 23 03/30/2021    ALT 22 03/30/2021     PT/INR:  No results found for: PTINR  HgBA1c:No results found for: LABA1C  Lab Results   Component Value Date    CKMB 1.72 03/31/2013    TROPONINI <0.01 03/30/2021         Cardiac Data     Last EKG:   3/30/2021 NSR with 1st AVB, RBBB, LAD    Echo: 3/2021   Technically difficult study due to body surface area and patient is a smoker. Normal left ventricular systolic function with ejection fraction of   55-60%. Definity echo contrast was used. No regional wall motion abnormalites are seen. Grade I diastolic dysfunction with normal filling pressure. Compared to previous study from 5- no changes noted in left   ventricular function. Mild mitral and aortic regurgitation. Mild aortic stenosis. Systolic pulmonic artery pressure (SPAP) is normal estimated at 28 mmHg   (Right atrial pressure of 3 mmHg). Stress Test:    Cath:    Studies:     Assessment and Plan      1. Sinus bradycardia  2. Syncope  3. HTN:  4. PVC: was in frequent trigeminy on last admission  5. Hyponatremia  6. Hypokalemia    PLAN  1. Syncope: appears to be miscommunication as HCTZ at D/c but unknown to cards. However I do feel this was appropriate given HTN upon d/c the patient proven unable to tolerate   - in face of new BB unlikely able to mount reflex and led to syncope  2. Patient was tolerating Lopressor during last hospitalization without issues. - Given high burden of trigeminy PVC felt that risk for heart failure progression was significant and required beta-blocker treatment   -On telemetry, currently no further PVCs and now somewhat bradycardic in the lower 50s, hold beta-blocker   -If he goes home without a beta-blocker I would place a 2-week monitor to assess PVC burden  3. HOLD LASIX and HCTZ. - suspect long term unlikely to tolerate HCTZ   - at time of last D/c pt was becoming hypetensive so may need alternative agent at slow start, ? norvasc 2.5mg if SBP >130  4.  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 ([address]    Near syncope    Bradycardia    Nausea    Weight loss    EKG abnormalities    HTN (hypertension)    Hiatal hernia    PUD (peptic ulcer disease)    Localized osteoarthrosis, lower leg    Knee pain, bilateral    Primary osteoarthritis of both knees    Anterior tibialis tendinitis    Staphylococcal arthritis of multiple sites ([address]    Swelling of knee joint    Acute arthritis    Crystal arthropathy    Calcium pyrophosphate arthropathy of multiple sites    Cellulitis of left lower extremity    Thrombophlebitis arm    Left arm cellulitis    Palpitations    PVC's (premature ventricular contractions)    SOB (shortness of breath)    Syncope and collapse    Hyponatremia    Hypokalemia           Thank you for allowing us to participate in the care of Andreas Stoll. Please call me with any questions [phone]. Lam Cox MD, [address] Cardiologist  [place] [address]  [phone] [facility]  [phone] [address] [place] [address]  3/31/2021 8:02 AM    I will address the patient's cardiac risk factors and adjusted pharmacologic treatment as needed. In addition, I have reinforced the need for patient directed risk factor modification. All questions and concerns were addressed to the patient/family. Alternatives to my treatment were discussed. The note was completed using EMR. Every effort was made to ensure accuracy; however, inadvertent computerized transcription errors may be present.

## 2021-03-31 NOTE — ACP (ADVANCE CARE PLANNING)
Advance Care Planning     General Advance Care Planning (ACP) Conversation    Date of Conversation: 3/30/2021  Conducted with: Patient with Decision Making Capacity    Healthcare Decision Maker:      Primary Decision Maker: Coco Mari - [phone]    Primary Decision Maker: Coco Mari - [phone]    Secondary Decision Maker: Nellie Miller - [phone]    Click here to complete [address] including selection of the Healthcare Decision Maker Relationship (ie \"Primary\")  Today we documented Decision Maker(s) consistent with Legal Next of Kin hierarchy. Content/Action Overview:   Has ACP document(s) NOT on file - requested patient to provide  Reviewed DNR/DNI and patient elects Full Code (Attempt Resuscitation)        Length of Voluntary ACP Conversation in minutes:  <16 minutes (Non-Billable)    Kirstie Mccarthy

## 2021-03-31 NOTE — DISCHARGE INSTR - COC
Continuity of Care Form    Patient Name: Ivonne Roa   :  10/29/1925  MRN:  [id number]    Admit date:  3/30/2021  Discharge date:  ***    Code Status Order: Full Code   Advance Directives:   Advance Care Flowsheet Documentation       Date/Time Healthcare Directive Type of Healthcare Directive Copy in [address] Agent's Name Healthcare Agent's Phone Number    21  Yes, patient has an advance directive for healthcare treatment  Living will  No, copy requested from other (See comment)  [facility]  --            Admitting Physician:  Shanika Krishna MD  PCP: Earl Littlejohn MD    Discharging Nurse: [facility] Unit/Room#: 9321/9368-21  Discharging Unit Phone Number: ***    Emergency Contact:   Extended Emergency Contact Information  Primary Emergency Contact: Coco Mari  Address: [address] [place] [address] Phone: [phone]  Relation: Child  Secondary Emergency Contact: Eunice Mari  Address: [address] [place] [address] Phone: [phone]  Relation: Grandchild    Past Surgical History:  Past Surgical History:   Procedure Laterality Date    APPENDECTOMY      CATARACT REMOVAL WITH IMPLANT Right 2016    CATARACT REMOVAL WITH IMPLANT Left 2016    CHOLECYSTECTOMY      COLONOSCOPY  10/20/1999    polyp    COLONOSCOPY  2002    ?     ENDOSCOPY, COLON, DIAGNOSTIC      EYE SURGERY      cataract implants bilat eyes    KNEE SURGERY Right 2017    video arthroscopy and I&D right knee, partial medial and lateral meniscectomy right knee    OTHER SURGICAL HISTORY  2012    excision squamouscell carcinoma     right cheek     OTHER SURGICAL HISTORY Left 2017    Irrigation and Drainage of left knee    PROSTATE BIOPSY  2007    Cancer treated with seeds    SKIN BIOPSY      cyst    UPPER GASTROINTESTINAL ENDOSCOPY  04/30/2004    Hiatal Hernia, Gastritis    UPPER GASTROINTESTINAL ENDOSCOPY  12/12/2007    Hiatal Hernia, Gastritis    UPPER GASTROINTESTINAL ENDOSCOPY  4/19/2013    gastritis    WRIST SURGERY Right 05/12/2017    I&D right wrist       Immunization History:   Immunization History   Administered Date(s) Administered    Influenza Virus Vaccine 01/19/2013    Pneumococcal Conjugate 7-valent (Vero Rump) 04/19/2011       Active Problems:  Patient Active Problem List   Diagnosis Code    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 ([facility] G40.409    Near syncope R55    Bradycardia R00.1    Nausea R11.0    Weight loss R63.4    EKG abnormalities R94.31    HTN (hypertension) I10    Hiatal hernia K44.9    PUD (peptic ulcer disease) K27.9    Localized osteoarthrosis, lower leg M17.10    Knee pain, bilateral M25.561, M25.562    Primary osteoarthritis of both knees M17.0    Anterior tibialis tendinitis M76.819    Staphylococcal arthritis of multiple sites ([facility] M00.09    Swelling of knee joint M25.469    Acute arthritis M19.90    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 116    Thrombophlebitis arm I80.8    Left arm cellulitis L03.114    Palpitations R00.2    PVC's (premature ventricular contractions) I49.3    SOB (shortness of breath) R06.02    Syncope and collapse R55    Hyponatremia E87.1    Hypokalemia E87.6       Isolation/Infection:   Isolation            No Isolation          Patient Infection Status       None to display            Nurse Assessment:  Last Vital Signs: BP (!) 158/76   Pulse 60   Temp 98 °F (36.7 °C) (Oral)   Resp 18   Wt 195 lb (88.5 kg)   SpO2 94%   BMI 29.65 kg/m²     Last documented pain score (0-10 scale):    Last Weight:   Wt Readings from Last 1 Encounters:   03/30/21 195 lb (88.5 kg)     Mental Status:  oriented and alert    IV Access:  - None    Nursing Mobility/ADLs:  Walking Assisted  Transfer  Assisted  Bathing  Assisted  Dressing  Assisted  Toileting  Assisted  Feeding  Independent  Med Admin  Independent  Med Delivery   whole and prefers mixed with applesauce    Wound Care Documentation and Therapy:  Incision 05/09/17 Knee Left; Anterior (Active)   Number of days: 1421       Incision 05/12/17 Knee Right (Active)   Number of days: 1418       Incision 05/12/17 Wrist Right (Active)   Number of days: 1418        Elimination:  Continence:   · Bowel: Yes  · Bladder: Yes  Urinary Catheter: None   Colostomy/Ileostomy/Ileal Conduit: No       Date of Last BM: ***    Intake/Output Summary (Last 24 hours) at 3/31/2021 1101  Last data filed at 3/31/2021 1043  Gross per 24 hour   Intake 240 ml   Output 225 ml   Net 15 ml     No intake/output data recorded. Safety Concerns: At Risk for Falls    Impairments/Disabilities:      None    Nutrition Therapy:  Current Nutrition Therapy:   - Oral Diet:  Cardiac    Routes of Feeding: Oral  Liquids: No Restrictions  Daily Fluid Restriction: no  Last Modified Barium Swallow with Video (Video Swallowing Test): not done    Treatments at the Time of Hospital Discharge:   Respiratory Treatments: ***  Oxygen Therapy:  is not on home oxygen therapy.   Ventilator:    - No ventilator support    Rehab Therapies: Physical Therapy, Occupational Therapy and Nurse  Weight Bearing Status/Restrictions: No weight bearing restirctions  Other Medical Equipment (for information only, NOT a DME order):  wheelchair and walker  Other Treatments: [facility]: LEVEL 3 [address] to establish plan of care for patient over 60 day period   [address] Initial home SN evaluation visit to occur within 24-48 hours for:  1)  medication management  2)  VS and clinical assessment  3)  S&S chronic disease exacerbation education + when to contact MD/NP  4)  care coordination   Medication Reconciliation during 1st SN visit   PT/OT/Speech    Evaluations in home

## 2021-03-31 NOTE — PROGRESS NOTES
4 Eyes Skin Assessment     The patient is being assess for   Admission    I agree that 2 RN's have performed a thorough Head to Toe Skin Assessment on the patient. ALL assessment sites listed below have been assessed. Areas assessed by both nurses:   [x]   Head, Face, and Ears   [x]   Shoulders, Back, and Chest, Abdomen  [x]   Arms, Elbows, and Hands   [x]   Coccyx, Sacrum, and Ischium  [x]   Legs, Feet, and Heels        Scattered abrasions and bruising across all extremities. Scabs on scalp. Dry flaky skin on B/l feet and buttocks. Unstageable wound on bottom of R foot. **SHARE this note so that the co-signing nurse is able to place an eSignature**    Co-signer eSignature: Electronically signed by Harika Leo on 3/31/21 at 6:09 AM EDT    Does the Patient have Skin Breakdown?  yes          Louie Prevention initiated: yes   Wound Care Orders initiated:  yes      [facility] nurse consulted for Pressure Injury (Stage 3,4, Unstageable, DTI, NWPT, Complex wounds)and New or Established Ostomies:  Unstageable wound on Right foot.       Primary Nurse eSignature: Electronically signed by La Nena Whitney RN on 3/30/21 at 11:38 PM EDT

## 2021-04-01 LAB
A/G RATIO: 1.8 (ref 1.1–2.2)
ALBUMIN SERPL-MCNC: 3.7 G/DL (ref 3.4–5)
ALP BLD-CCNC: 45 U/L (ref 40–129)
ALT SERPL-CCNC: 20 U/L (ref 10–40)
ANION GAP SERPL CALCULATED.3IONS-SCNC: 5 MMOL/L (ref 3–16)
AST SERPL-CCNC: 26 U/L (ref 15–37)
BASOPHILS ABSOLUTE: 0.1 K/UL (ref 0–0.2)
BASOPHILS RELATIVE PERCENT: 0.8 %
BILIRUB SERPL-MCNC: 0.6 MG/DL (ref 0–1)
BUN BLDV-MCNC: 13 MG/DL (ref 7–20)
CALCIUM SERPL-MCNC: 9.2 MG/DL (ref 8.3–10.6)
CHLORIDE BLD-SCNC: 99 MMOL/L (ref 99–110)
CO2: 32 MMOL/L (ref 21–32)
CREAT SERPL-MCNC: 0.6 MG/DL (ref 0.8–1.3)
EOSINOPHILS ABSOLUTE: 0.1 K/UL (ref 0–0.6)
EOSINOPHILS RELATIVE PERCENT: 1.5 %
ESTIMATED AVERAGE GLUCOSE: 125.5 MG/DL
GFR AFRICAN AMERICAN: >60
GFR NON-AFRICAN AMERICAN: >60
GLOBULIN: 2.1 G/DL
GLUCOSE BLD-MCNC: 106 MG/DL (ref 70–99)
HBA1C MFR BLD: 6 %
HCT VFR BLD CALC: 39.1 % (ref 40.5–52.5)
HEMOGLOBIN: 13.6 G/DL (ref 13.5–17.5)
LYMPHOCYTES ABSOLUTE: 1.2 K/UL (ref 1–5.1)
LYMPHOCYTES RELATIVE PERCENT: 17.4 %
MCH RBC QN AUTO: 32.6 PG (ref 26–34)
MCHC RBC AUTO-ENTMCNC: 34.8 G/DL (ref 31–36)
MCV RBC AUTO: 93.5 FL (ref 80–100)
MONOCYTES ABSOLUTE: 0.8 K/UL (ref 0–1.3)
MONOCYTES RELATIVE PERCENT: 11.3 %
NEUTROPHILS ABSOLUTE: 4.8 K/UL (ref 1.7–7.7)
NEUTROPHILS RELATIVE PERCENT: 69 %
PDW BLD-RTO: 13.6 % (ref 12.4–15.4)
PLATELET # BLD: 244 K/UL (ref 135–450)
PMV BLD AUTO: 7 FL (ref 5–10.5)
POTASSIUM REFLEX MAGNESIUM: 4.5 MMOL/L (ref 3.5–5.1)
RBC # BLD: 4.18 M/UL (ref 4.2–5.9)
SODIUM BLD-SCNC: 136 MMOL/L (ref 136–145)
TOTAL PROTEIN: 5.8 G/DL (ref 6.4–8.2)
WBC # BLD: 6.9 K/UL (ref 4–11)

## 2021-04-01 PROCEDURE — 83036 HEMOGLOBIN GLYCOSYLATED A1C: CPT

## 2021-04-01 PROCEDURE — 85025 COMPLETE CBC W/AUTO DIFF WBC: CPT

## 2021-04-01 PROCEDURE — 97530 THERAPEUTIC ACTIVITIES: CPT

## 2021-04-01 PROCEDURE — 1200000000 HC SEMI PRIVATE

## 2021-04-01 PROCEDURE — 97110 THERAPEUTIC EXERCISES: CPT

## 2021-04-01 PROCEDURE — 80053 COMPREHEN METABOLIC PANEL: CPT

## 2021-04-01 PROCEDURE — 97116 GAIT TRAINING THERAPY: CPT

## 2021-04-01 PROCEDURE — 94640 AIRWAY INHALATION TREATMENT: CPT

## 2021-04-01 PROCEDURE — 6370000000 HC RX 637 (ALT 250 FOR IP): Performed by: HOSPITALIST

## 2021-04-01 PROCEDURE — 6360000002 HC RX W HCPCS: Performed by: HOSPITALIST

## 2021-04-01 PROCEDURE — 99232 SBSQ HOSP IP/OBS MODERATE 35: CPT | Performed by: NURSE PRACTITIONER

## 2021-04-01 PROCEDURE — 2580000003 HC RX 258: Performed by: HOSPITALIST

## 2021-04-01 PROCEDURE — 6370000000 HC RX 637 (ALT 250 FOR IP): Performed by: NURSE PRACTITIONER

## 2021-04-01 PROCEDURE — 36415 COLL VENOUS BLD VENIPUNCTURE: CPT

## 2021-04-01 PROCEDURE — 97535 SELF CARE MNGMENT TRAINING: CPT

## 2021-04-01 RX ORDER — AMLODIPINE BESYLATE 2.5 MG/1
2.5 TABLET ORAL DAILY
Qty: 30 TABLET | Refills: 3 | Status: SHIPPED | OUTPATIENT
Start: 2021-04-02

## 2021-04-01 RX ADMIN — ENOXAPARIN SODIUM 40 MG: 40 INJECTION SUBCUTANEOUS at 09:24

## 2021-04-01 RX ADMIN — Medication 10 ML: at 09:30

## 2021-04-01 RX ADMIN — TIMOLOL MALEATE 1 DROP: 5 SOLUTION OPHTHALMIC at 09:30

## 2021-04-01 RX ADMIN — MAGNESIUM GLUCONATE 500 MG ORAL TABLET 400 MG: 500 TABLET ORAL at 09:24

## 2021-04-01 RX ADMIN — TIMOLOL MALEATE 1 DROP: 5 SOLUTION OPHTHALMIC at 20:24

## 2021-04-01 RX ADMIN — GLYCOPYRROLATE AND FORMOTEROL FUMARATE 2 PUFF: 9; 4.8 AEROSOL, METERED RESPIRATORY (INHALATION) at 19:40

## 2021-04-01 RX ADMIN — Medication 10 ML: at 20:25

## 2021-04-01 RX ADMIN — PREDNISONE 10 MG: 10 TABLET ORAL at 09:24

## 2021-04-01 RX ADMIN — AMLODIPINE BESYLATE 2.5 MG: 2.5 TABLET ORAL at 09:24

## 2021-04-01 RX ADMIN — FAMOTIDINE 20 MG: 20 TABLET ORAL at 09:24

## 2021-04-01 RX ADMIN — ASPIRIN 81 MG: 81 TABLET, COATED ORAL at 09:24

## 2021-04-01 RX ADMIN — GLYCOPYRROLATE AND FORMOTEROL FUMARATE 2 PUFF: 9; 4.8 AEROSOL, METERED RESPIRATORY (INHALATION) at 08:31

## 2021-04-01 RX ADMIN — TAMSULOSIN HYDROCHLORIDE 0.4 MG: 0.4 CAPSULE ORAL at 09:24

## 2021-04-01 RX ADMIN — ATORVASTATIN CALCIUM 10 MG: 10 TABLET, FILM COATED ORAL at 09:24

## 2021-04-01 ASSESSMENT — PAIN SCALES - GENERAL: PAINLEVEL_OUTOF10: 0

## 2021-04-01 NOTE — PROGRESS NOTES
Occupational Therapy  Facility/Department: [place] [address] - MED SURG/ORTHO  Daily Treatment Note  NAME: Robin Stoll  : 10/29/1925  MRN: [id number]    Date of Service: 2021    Discharge Recommendations:  24 hour supervision or assist(initially)     Assessment   Performance deficits / Impairments: Decreased high-level IADLs;Decreased functional mobility ; Decreased balance;Decreased vision/visual deficit; Decreased ADL status; Decreased strength  Assessment: Pt progressing towards OT goals. Pt required SBA to complete grooming tasks while standing at sink with use of RW for ~4 minutes. Pt SBA for toileting and toilet transfers with use of bilateral grab bars. Pt performed 15 reps of BUE exercises to help increase strength. Pt demos need for SBA for static standing ~3 minutes to increase balance using RW. Pt would benefit from 24 hr A and HHOT continue to address the occupational performance deficits noted above. Prognosis: Good  OT Education: OT Role;Plan of Care;Home Exercise Program;Precautions;Transfer Training;ADL Adaptive Strategies  Patient Education: disease specific: role of OT, therex, safety transfers  REQUIRES OT FOLLOW UP: Yes    Activity Tolerance  Activity Tolerance: Patient Tolerated treatment well  Activity Tolerance: Vitals: AC=573/56, HR=70, O2=98%    Safety Devices  Safety Devices in place: Yes  Type of devices: Call light within reach; Chair alarm in place; Left in chair;Gait belt       Patient Diagnosis(es): The primary encounter diagnosis was Syncope and collapse. Diagnoses of Hypokalemia, Sinus bradycardia, and PVC's (premature ventricular contractions) were also pertinent to this visit. has a past medical history of Cancer ([address] GERD (gastroesophageal reflux disease), Glaucoma, History of stomach ulcers, Hyperlipidemia, Hypertension, Nausea alone, Pneumonia, Psychiatric problem, S/P radiation therapy, Seasonal allergies, and Staphylococcal arthritis of left knee ([address]    has a past surgical history that includes Appendectomy; Cholecystectomy; skin biopsy; other surgical history (7/27/2012); Colonoscopy (10/20/1999); Colonoscopy (01/16/2002); Upper gastrointestinal endoscopy (04/30/2004); Upper gastrointestinal endoscopy (12/12/2007); Prostate biopsy (2007); Upper gastrointestinal endoscopy (4/19/2013); Cataract removal with implant (Right, 12/01/2016); Cataract removal with implant (Left, 12/13/2016); other surgical history (Left, 05/09/2017); Wrist surgery (Right, 05/12/2017); knee surgery (Right, 05/12/2017); eye surgery; and Endoscopy, colon, diagnostic. Restrictions  Restrictions/Precautions  Restrictions/Precautions: General Precautions, Fall Risk  Position Activity Restriction  Other position/activity restrictions: up with assistance    Subjective   General  Chart Reviewed: Yes  Patient assessed for rehabilitation services?: Yes  Response to previous treatment: Patient with no complaints from previous session  Family / Caregiver Present: No  Referring Practitioner: Pee Lozada MD  Diagnosis: syncope    Subjective  Subjective: Pt sitting EOB, pleasant and agreeable to OT treatment. General Comment  Comments: RN approved therapy  Patient Currently in Pain: Denies     Orientation  Orientation  Overall Orientation Status: Within Functional Limits    Objective    ADL  Grooming: Stand by assistance(standing at sink to comb hair and wash face)  Toileting: Stand by assistance    Balance  Standing Balance: Stand by assistance(using RW)  Standing Balance  Activity: standing at sink using RW for ~4 minutes to comb hair and wash face.  Static stand from chair using RW for 3 minutes    Functional Mobility  Functional - Mobility Device: Rolling Walker  Activity: To/from bathroom (use of RW)  Assist Level: Stand by assistance    Toilet Transfers  Toilet - Technique: Ambulating  Equipment Used: Standard toilet  Toilet Transfer: Stand by assistance(use of bilateral grab bars)     Transfers  Sit to stand: Stand by assistance (use of RW)  Stand to sit: Stand by assistance  Transfer Comments: using RW; VC needed for hand placement    Cognition  Overall Cognitive Status: Exceptions  Arousal/Alertness: Appropriate responses to stimuli  Following Commands: Follows multistep commands with repitition  Attention Span: Attends with cues to redirect  Problem Solving: Assistance required to correct errors made  Insights: Decreased awareness of deficits  Initiation: Requires cues for some     Type of ROM/Therapeutic Exercise  Type of ROM/Therapeutic Exercise: AROM  Comment: BUE, seated in chair  Exercises  Shoulder Flexion: 15x  Shoulder Extension: 15x  Horizontal ABduction: 15x  Horizontal ADduction: 15x  Elbow Flexion: 15x  Elbow Extension: 15x  Finger Flexion: 15x  Finger Extension: 15    Plan   Plan  Times per week: 3-5x/wk  Current Treatment Recommendations: Strengthening, Endurance Training, Balance Training, Functional Mobility Training, Safety Education & Training, Self-Care / ADL         AM-PAC Score  AM-[facility] Inpatient Daily Activity Raw Score: 20 (04/01/21 1103)  AM-PAC Inpatient ADL T-Scale Score : 42.03 (04/01/21 1103)  ADL Inpatient CMS 0-100% Score: 38.32 (04/01/21 1103)  ADL Inpatient CMS G-Code Modifier : Cherylene Brasher (04/01/21 1103)    Goals  Short term goals  Time Frame for Short term goals:  One week (4/6/2021)  Short term goal 1: Pt will complete 15 reps of BUE exercises to increase strength to engage in ADLs (by 4/3/2021)--GOAL MET, pt performed 15reps BUE exercises 4/01/21  Short term goal 2: Pt will complete grooming activity at sink for 5 minutes with supervision--ongoing, pt completed ADL's standing at sink for 4 minutes SBA 4/01/21  Short term goal 3: Pt will complete toilet transfer with mod I--ongoing, pt completed toilet transfer with SBA  4/01/21  Patient Goals   Patient goals : \"to go home\"       Therapy Time   Individual Concurrent Group Co-treatment   Time In 1009         Time Out 7081 Minutes 2101 LenaweeRogelio Rosa S/SHERIF    Agree with above. All information reviewed by Johana Centeno COTA/ALEENA

## 2021-04-01 NOTE — PROGRESS NOTES
Notified by RN that patiens HR dropped while ambulating today, patient asymptomatic. Continue to hold bhumika agents. Keep another day for monitoring.

## 2021-04-01 NOTE — PROGRESS NOTES
Hospitalist Progress Note      PCP: Abdoulaye Rojas MD    Date of Admission: 3/30/2021    Chief Complaint:    Loss of Consciousness       per pt family member he passed out earlier unaware of how long he was out/seen here last week for heart issues\"         Hospital Course: 81 yo male who remains independent in his own home presented to the ED for evaluation of syncope. Pt is < 1 week post hospitalization for Trigeminy and dyspnea and was treated with Ceftin for possible bronchopneumonia     ED course EKG was obtained and notable for sinus bradycardia with first-degree AV block, RBBB, and left anterior fascicular block. CXR was notable for bibasilar hypoaeration but otherwise interpreted as normal.      Patient admitted for syncope and collapse with notable bradycardia with bifascicular block. Subjective: EMR and notes reviewed pt seen and examined. HR has remained in the 60's. Pt has no complaints.  Sitting up in chair       Medications:  Reviewed    Infusion Medications    sodium chloride       Scheduled Medications    amLODIPine  2.5 mg Oral Daily    aspirin  81 mg Oral Daily    glycopyrrolate-formoterol  2 puff Inhalation BID    timolol  1 drop Both Eyes BID    tamsulosin  0.4 mg Oral Daily    atorvastatin  10 mg Oral Daily    predniSONE  10 mg Oral Daily    magnesium oxide  400 mg Oral Daily    sodium chloride flush  10 mL Intravenous 2 times per day    enoxaparin  40 mg Subcutaneous Daily    famotidine  20 mg Oral Daily     PRN Meds: acetaminophen, sodium chloride, sennosides-docusate sodium, sodium chloride flush, sodium chloride, potassium chloride **OR** potassium alternative oral replacement **OR** potassium chloride, magnesium sulfate, promethazine **OR** ondansetron, senna, acetaminophen **OR** acetaminophen      Intake/Output Summary (Last 24 hours) at 4/1/2021 1553  Last data filed at 4/1/2021 1546  Gross per 24 hour   Intake 240 ml   Output 775 ml   Net -535 ml Physical Exam Performed:    BP (!) 153/75   Pulse 64   Temp 95 °F (35 °C) (Axillary)   Resp 16   Wt 200 lb 6.4 oz (90.9 kg)   SpO2 95%   BMI 30.47 kg/m²     General appearance: No apparent distress, appears stated age and cooperative. Impressive for his age   [de-identified]: Pupils equal, round, and reactive to light. Conjunctivae/corneas clear. Neck: Supple, with full range of motion. No jugular venous distention. Trachea midline. Respiratory:  Normal respiratory effort. Clear to auscultation, bilaterally without Rales/Wheezes/Rhonchi. Cardiovascular: Regular rate and rhythm with normal S1/S2 without murmurs, rubs or gallops. Abdomen: Soft, non-tender, non-distended with normal bowel sounds. Musculoskeletal: No clubbing, cyanosis or edema bilaterally. Full range of motion without deformity. Skin: Skin color, texture, turgor normal.  No rashes or lesions. Neurologic:  Neurovascularly intact without any focal sensory/motor deficits. Cranial nerves: II-XII intact, grossly non-focal.  Psychiatric: Alert and oriented, thought content appropriate, normal insight  Capillary Refill: Brisk,< 3 seconds   Peripheral Pulses: +2 palpable, equal bilaterally       Labs:   Recent Labs     03/30/21  1450 04/01/21  0543   WBC 7.4 6.9   HGB 13.8 13.6   HCT 40.0* 39.1*    244     Recent Labs     03/30/21  1450 04/01/21  0543   * 136   K 3.3* 4.5   CL 91* 99   CO2 31 32   BUN 23* 13   CREATININE 0.7* 0.6*   CALCIUM 9.3 9.2     Recent Labs     03/30/21  1450 04/01/21  0543   AST 23 26   ALT 22 20   BILITOT 0.6 0.6   ALKPHOS 42 45     No results for input(s): INR in the last 72 hours. Recent Labs     03/30/21 1450   TROPONINI <0.01       Urinalysis:      Lab Results   Component Value Date    NITRU Negative 03/30/2021    BLOODU Negative 03/30/2021    SPECGRAV 1.025 03/30/2021    GLUCOSEU Negative 03/30/2021       Radiology:  XR CHEST PORTABLE   Final Result   No acute process.       Bibasilar hypoaeration Assessment/Plan:    Active Hospital Problems    Diagnosis    Syncope and collapse [R55]    Hyponatremia [E87.1]    Hypokalemia [E87.6]    PVC's (premature ventricular contractions) [I49.3]    HTN (hypertension) [I10]    Hiatal hernia [K44.9]    PUD (peptic ulcer disease) [K27.9]    Sinus bradycardia [R00.1]     Syncope and collapse   -Suspect may be secondary to recent addition of Lopressor 12.5 mg twice daily, which was started to address his trigeminy or perhaps 2/2  Resuming lasix and hctz as OP setting, these were held while IP w/o adverse effects. PT was given IVF in the ED   -Beta-blocker held  -consult cardiology for further recommendations  -Fall and seizure precautions in place  -Orthostatic vital signs negative  -Strict I's and O's  -ECHO completed 3/24/2021   -PT/OT to evaluate and treat  - tele      Hyponatremia/hypokalemia  -Patient with normal sodium and potassium levels just 7 days prior to admission  -Review of epic medical record reveals that both his HCTZ and Lasix were HELD during his recent hospital stay with no adverse effects; review of medications for discharge reveals that BOTH diuretics were resumed after leaving  - IVF given   - replace and monitor     COPD  -Patient stable on room air  -Recommend incentive spirometry every 4 hours while awake  -Continue Anoro Ellipta inhaler per home dosage  -Patient on prednisone 10 mg daily which has been confirmed with his PCP and family members; continue at current dosage for now     History of PUD  -Continue ASA but not sure enteric-coated  -NSAIDs currently held as they may be contributing to his hyponatremia  -Initiate H2 blocker Pepcid once daily (rather than PPI as latter agent may worsen hyponatremia)    HTN- sub therapeutic- improved with Norvasc   - added 2.5 Norvasc monitor         DVT Prophylaxis: lovenox   Diet: DIET CARDIAC; Carb Control: 4 carb choices (60 gms)/meal; No Added Salt (3-4 GM);  Daily Fluid Restriction: 2000

## 2021-04-01 NOTE — CARE COORDINATION
CASE MANAGEMENT DISCHARGE SUMMARY      Discharge to: Home with numerous family close by and skilled Home Care through [facility]. New Durable Medical Equipment ordered/agency: None    Transportation: Family    Confirmed discharge plan with: LVM with daughter Henrry Richardson on this day. Patient: yes     Facility/Agency, name:  [facility] faxed- Spoke to [facility] with [facility]. RN, name: Zachary Grier    Note: Discharging nurse to complete [facility], reconcile [facility], and place final copy with patient's discharge packet. RN to ensure that written prescriptions for  Level II medications are sent with patient to the facility as per protocol.

## 2021-04-01 NOTE — CARE COORDINATION
[facility]    Referral received from  to follow for home care services. I will follow for needs, and speak with patient to verify demos.     Pending staffing approval    Singh Canales RN, BSN CTN  [facility] [phone]

## 2021-04-01 NOTE — PROGRESS NOTES
[facility]  Cardiology  Progress Note    Admission date:  3/30/2021    Reason for follow up visit: Syncope    HPI/CC: Javid Cardenas is a 80 y.o. male who was admitted 3/30/2021 for syncope. Rhythm has been sinus bradycardia 50s. Subjective: Denies chest pain, shortness of breath, palpitations and dizziness. Vitals:  Blood pressure (!) 165/77, pulse 61, temperature 98.1 °F (36.7 °C), temperature source Oral, resp. rate 18, weight 200 lb 6.4 oz (90.9 kg), SpO2 91 %.   Temp  Av.9 °F (36.6 °C)  Min: 97.4 °F (36.3 °C)  Max: 98.6 °F (37 °C)  Pulse  Av  Min: 60  Max: 79  BP  Min: 143/91  Max: 169/77  SpO2  Av.7 %  Min: 91 %  Max: 97 %    24 hour I/O    Intake/Output Summary (Last 24 hours) at 2021 0816  Last data filed at 2021 0333  Gross per 24 hour   Intake 820 ml   Output 1575 ml   Net -755 ml     Current Facility-Administered Medications   Medication Dose Route Frequency Provider Last Rate Last Admin    amLODIPine (NORVASC) tablet 2.5 mg  2.5 mg Oral Daily KAYLEEN George - CNP   2.5 mg at 21 1651    acetaminophen (TYLENOL) tablet 650 mg  650 mg Oral Q6H PRN Ruba Corey MD        aspirin EC tablet 81 mg  81 mg Oral Daily Ruba Corey MD   81 mg at 21 0936    glycopyrrolate-formoterol (BEVESPI) 9-4.8 MCG/ACT inhaler 2 puff  2 puff Inhalation BID Ruba Corey MD   2 puff at 21 1916    timolol (TIMOPTIC) 0.5 % ophthalmic solution 1 drop  1 drop Both Eyes BID Ruba Corey MD   1 drop at 21 1935    tamsulosin (FLOMAX) capsule 0.4 mg  0.4 mg Oral Daily Ruba Corey MD   0.4 mg at 21 0483    sodium chloride (OCEAN, BABY AYR) 0.65 % nasal spray 2 spray  2 spray Nasal PRN Ruba Corey MD        atorvastatin (LIPITOR) tablet 10 mg  10 mg Oral Daily Ruba Corey MD   10 mg at 21 1082    sennosides-docusate sodium (SENOKOT-S) 8.6-50 MG tablet 2 tablet  2 tablet Oral Daily PRN Ruba Corey MD       [employer] predniSONE (DELTASONE) tablet 10 mg  10 mg Oral Daily Anant Todd MD   10 mg at 03/31/21 0935    magnesium oxide (MAG-OX) tablet 400 mg  400 mg Oral Daily Anant Todd MD   400 mg at 03/31/21 0936    sodium chloride flush 0.9 % injection 10 mL  10 mL Intravenous 2 times per day Anant Todd MD   10 mL at 03/31/21 1935    sodium chloride flush 0.9 % injection 10 mL  10 mL Intravenous PRN Anant Todd MD        0.9 % sodium chloride infusion  25 mL Intravenous PRN Anant Todd MD        potassium chloride (KLOR-CON M) extended release tablet 40 mEq  40 mEq Oral PRN Anant Todd MD        Or    potassium bicarb-citric acid (EFFER-K) effervescent tablet 40 mEq  40 mEq Oral PRN Anant Todd MD        Or   Kim Jarvis potassium chloride 10 mEq/100 mL IVPB (Peripheral Line)  10 mEq Intravenous PRN Anant Todd MD        magnesium sulfate 2000 mg in 50 mL IVPB premix  2,000 mg Intravenous PRN Anant Todd MD        enoxaparin (LOVENOX) injection 40 mg  40 mg Subcutaneous Daily Anant Todd MD   40 mg at 03/31/21 3983    promethazine (PHENERGAN) tablet 12.5 mg  12.5 mg Oral Q6H PRN Anant Todd MD        Or    ondansetron [facility]) injection 4 mg  4 mg Intravenous Q6H PRN Anant Todd MD        senna (SENOKOT) tablet 8.6 mg  1 tablet Oral Daily PRN Anant Todd MD        acetaminophen (TYLENOL) tablet 650 mg  650 mg Oral Q6H PRN Anant Todd MD        Or   Kim Jarvis acetaminophen (TYLENOL) suppository 650 mg  650 mg Rectal Q6H PRN Anant Todd MD        famotidine (PEPCID) tablet 20 mg  20 mg Oral Daily Anant Todd MD   20 mg at 03/31/21 3108     Review of Systems    Objective:     Telemetry monitor: SB 50s    Physical Exam:  Constitutional:  Comfortable and alert, NAD, appears stated age  Eyes: PERRL, sclera nonicteric  Neck:  Supple, no masses, no thyroidmegaly, no JVD  Skin:  Warm and dry; no rash or lesions  Heart: Regular, normal apex, S1 and S2 normal, +murmur  Lungs:  Normal respiratory effort; clear; no wheezing/rhonchi/rales  Abdomen: soft, non tender, + bowel sounds  Extremities:  No edema or cyanosis; no clubbing  Neuro: alert and oriented, moves legs and arms equally, normal mood and affect    Data Reviewed:    Echo 3/2021:   Technically difficult study due to body surface area and patient is a smoker.   Normal left ventricular systolic function with ejection fraction of   55-60%. Definity echo contrast was used.   No regional wall motion abnormalites are seen.   Grade I diastolic dysfunction with normal filling pressure.   Compared to previous study from 5- no changes noted in left   ventricular function.   Mild mitral and aortic regurgitation.   Mild aortic stenosis.   Systolic pulmonic artery pressure (SPAP) is normal estimated at 28 mmHg   (Right atrial pressure of 3 mmHg).     Lab Reviewed:     Renal Profile:  Lab Results   Component Value Date    CREATININE 0.6 04/01/2021    BUN 13 04/01/2021     04/01/2021    K 4.5 04/01/2021    CL 99 04/01/2021    CO2 32 04/01/2021     CBC:    Lab Results   Component Value Date    WBC 6.9 04/01/2021    RBC 4.18 04/01/2021    HGB 13.6 04/01/2021    HCT 39.1 04/01/2021    MCV 93.5 04/01/2021    RDW 13.6 04/01/2021     04/01/2021     BNP:  No results found for: PROBNP  Fasting Lipid Panel:    Lab Results   Component Value Date    CHOL 161 03/23/2021    HDL 64 03/23/2021    HDL 60 02/24/2012    TRIG 89 03/23/2021     Cardiac Enzymes:  CK/MbTroponin  Lab Results   Component Value Date    CKMB 1.72 03/31/2013    TROPONINI <0.01 03/30/2021     PT/ INR   Lab Results   Component Value Date    INR 1.14 03/31/2013    PROTIME 12.9 03/31/2013     PTT No results found for: PTT   Lab Results   Component Value Date    MG 1.90 03/22/2021      Lab Results   Component Value Date    TSH 0.33 04/19/2013     All labs and imaging reviewed today    Assessment:  Syncope: etiology unknown, but possibly related to HCTZ and new beta blocker  Sinus bradycardia: stable  PVCs: improved; frequent PVCs noted on previous admission last week  Mild AS  Mild MR/AI  HTN: uncontrolled  Normal LVEF on echo 3/2021    Plan:   1. Discontinue lopressor due to bradycardia, avoid bhumika agents  2. No lasix or HCTZ  3. Continue norvasc 2.5 mg daily  4. Cardiac monitor at discharge for bradycardia/PVC burden  5. Ok to discharge today if he is able to ambulate without symptoms. Follow up arranged.      KAYLEEN Mccoy-KAVITA  [facility] [address]  [phone]

## 2021-04-02 PROCEDURE — 99232 SBSQ HOSP IP/OBS MODERATE 35: CPT | Performed by: NURSE PRACTITIONER

## 2021-04-02 PROCEDURE — 6370000000 HC RX 637 (ALT 250 FOR IP): Performed by: HOSPITALIST

## 2021-04-02 PROCEDURE — 6370000000 HC RX 637 (ALT 250 FOR IP): Performed by: NURSE PRACTITIONER

## 2021-04-02 PROCEDURE — 97530 THERAPEUTIC ACTIVITIES: CPT

## 2021-04-02 PROCEDURE — 6370000000 HC RX 637 (ALT 250 FOR IP)

## 2021-04-02 PROCEDURE — 94640 AIRWAY INHALATION TREATMENT: CPT

## 2021-04-02 PROCEDURE — 6360000002 HC RX W HCPCS: Performed by: HOSPITALIST

## 2021-04-02 PROCEDURE — 2580000003 HC RX 258: Performed by: HOSPITALIST

## 2021-04-02 PROCEDURE — 1200000000 HC SEMI PRIVATE

## 2021-04-02 RX ORDER — OXYCODONE HYDROCHLORIDE AND ACETAMINOPHEN 5; 325 MG/1; MG/1
2 TABLET ORAL EVERY 4 HOURS PRN
Status: DISCONTINUED | OUTPATIENT
Start: 2021-04-02 | End: 2021-04-06 | Stop reason: HOSPADM

## 2021-04-02 RX ORDER — OXYCODONE HYDROCHLORIDE AND ACETAMINOPHEN 5; 325 MG/1; MG/1
1 TABLET ORAL EVERY 4 HOURS PRN
Status: DISCONTINUED | OUTPATIENT
Start: 2021-04-02 | End: 2021-04-06 | Stop reason: HOSPADM

## 2021-04-02 RX ADMIN — TAMSULOSIN HYDROCHLORIDE 0.4 MG: 0.4 CAPSULE ORAL at 10:04

## 2021-04-02 RX ADMIN — GLYCOPYRROLATE AND FORMOTEROL FUMARATE 2 PUFF: 9; 4.8 AEROSOL, METERED RESPIRATORY (INHALATION) at 19:49

## 2021-04-02 RX ADMIN — GLYCOPYRROLATE AND FORMOTEROL FUMARATE 2 PUFF: 9; 4.8 AEROSOL, METERED RESPIRATORY (INHALATION) at 08:00

## 2021-04-02 RX ADMIN — Medication: at 06:01

## 2021-04-02 RX ADMIN — TIMOLOL MALEATE 1 DROP: 5 SOLUTION OPHTHALMIC at 20:52

## 2021-04-02 RX ADMIN — ACETAMINOPHEN 650 MG: 325 TABLET ORAL at 16:35

## 2021-04-02 RX ADMIN — OXYCODONE AND ACETAMINOPHEN 2 TABLET: 5; 325 TABLET ORAL at 20:54

## 2021-04-02 RX ADMIN — AMLODIPINE BESYLATE 2.5 MG: 2.5 TABLET ORAL at 10:03

## 2021-04-02 RX ADMIN — Medication 10 ML: at 10:08

## 2021-04-02 RX ADMIN — TIMOLOL MALEATE 1 DROP: 5 SOLUTION OPHTHALMIC at 10:05

## 2021-04-02 RX ADMIN — Medication 10 ML: at 20:52

## 2021-04-02 RX ADMIN — PREDNISONE 10 MG: 10 TABLET ORAL at 10:04

## 2021-04-02 RX ADMIN — ATORVASTATIN CALCIUM 10 MG: 10 TABLET, FILM COATED ORAL at 10:04

## 2021-04-02 RX ADMIN — FAMOTIDINE 20 MG: 20 TABLET ORAL at 10:04

## 2021-04-02 RX ADMIN — MAGNESIUM GLUCONATE 500 MG ORAL TABLET 400 MG: 500 TABLET ORAL at 10:04

## 2021-04-02 RX ADMIN — ENOXAPARIN SODIUM 40 MG: 40 INJECTION SUBCUTANEOUS at 10:04

## 2021-04-02 RX ADMIN — ASPIRIN 81 MG: 81 TABLET, COATED ORAL at 10:04

## 2021-04-02 RX ADMIN — ACETAMINOPHEN 650 MG: 325 TABLET ORAL at 06:48

## 2021-04-02 ASSESSMENT — PAIN SCALES - GENERAL: PAINLEVEL_OUTOF10: 9

## 2021-04-02 NOTE — PROGRESS NOTES
Hospitalist Progress Note      PCP: Jose Antonio Jackson MD    Date of Admission: 3/30/2021    Chief Complaint:    Loss of Consciousness       per pt family member he passed out earlier unaware of how long he was out/seen here last week for heart issues\"         Hospital Course: 79 yo male who remains independent in his own home presented to the ED for evaluation of syncope. Pt is < 1 week post hospitalization for Trigeminy and dyspnea and was treated with Ceftin for possible bronchopneumonia     ED course EKG was obtained and notable for sinus bradycardia with first-degree AV block, RBBB, and left anterior fascicular block. CXR was notable for bibasilar hypoaeration but otherwise interpreted as normal.      Patient admitted for syncope and collapse with notable bradycardia with bifascicular block. Subjective: EMR and notes reviewed pt seen and examined. HR has remained stable overnight. He has developed edema to his left knee and it is painful. Had Ortho PA see with me.       Thank you for  Medications:  Reviewed    Infusion Medications    sodium chloride       Scheduled Medications    amLODIPine  2.5 mg Oral Daily    aspirin  81 mg Oral Daily    glycopyrrolate-formoterol  2 puff Inhalation BID    timolol  1 drop Both Eyes BID    tamsulosin  0.4 mg Oral Daily    atorvastatin  10 mg Oral Daily    predniSONE  10 mg Oral Daily    magnesium oxide  400 mg Oral Daily    sodium chloride flush  10 mL Intravenous 2 times per day    enoxaparin  40 mg Subcutaneous Daily    famotidine  20 mg Oral Daily     PRN Meds: acetaminophen, sodium chloride, sennosides-docusate sodium, sodium chloride flush, sodium chloride, potassium chloride **OR** potassium alternative oral replacement **OR** potassium chloride, magnesium sulfate, promethazine **OR** ondansetron, senna, acetaminophen **OR** acetaminophen      Intake/Output Summary (Last 24 hours) at 4/2/2021 1104  Last data filed at 4/1/2021 1815  Gross per of gout   - will have pt ambulate and assess if the pain causes difficulty with ambulation, did poorly and had significant pain, Hold DC   - Ortho aware will asp in am   - aspiration Vs ace wrap and medical mgt        DVT Prophylaxis: lovenox   Diet: DIET CARDIAC; Carb Control: 4 carb choices (60 gms)/meal; No Added Salt (3-4 GM);  Daily Fluid Restriction: 2000 ml  Code Status: Full Code    PT/OT Eval Status: ordered rec's for HHT     Dispo - possible home with [facility] over the weekend will see how knee does     Joellen Toledo, APRN - CNP

## 2021-04-02 NOTE — PROGRESS NOTES
Physical Therapy  Facility/Department: [facility] C5 - MED SURG/ORTHO  Daily Treatment Note  NAME: Kuldip Stoll  : 10/29/1925  MRN: [id number]    Date of Service: 2021    Discharge Recommendations:  Continue to assess pending progress, Patient would benefit from continued therapy after discharge   PT Equipment Recommendations  Equipment Needed: No    Assessment   Body structures, Functions, Activity limitations: Decreased functional mobility ; Decreased strength;Decreased safe awareness;Decreased endurance;Decreased balance  Assessment: Pt developed significant L knee pain overnight and now requires mod/max A for sit-stands from EOB and is unable to ambulate. Pt unsafe to return home at this time due to L knee pain. Will continue to progress mobility as pain improves and assess for discharge recommendations after intervention completed for L knee pain. Treatment Diagnosis: impaired functional mobility  Prognosis: Good  PT Education: Goals; General Safety;Gait Training;PT Role;Plan of Care;Transfer Training;Disease Specific Education  Patient Education: Educated on safety with transfers with L knee pain and increased assist level with transfers - pt verbalized understanding  REQUIRES PT FOLLOW UP: Yes  Activity Tolerance  Activity Tolerance: Patient limited by pain  Activity Tolerance: Pt unable to bear weight on LLE d/t L knee pain     Patient Diagnosis(es): The primary encounter diagnosis was Syncope and collapse. Diagnoses of Hypokalemia, Sinus bradycardia, and PVC's (premature ventricular contractions) were also pertinent to this visit. has a past medical history of Cancer ([facility] [address] GERD (gastroesophageal reflux disease), Glaucoma, History of stomach ulcers, Hyperlipidemia, Hypertension, Nausea alone, Pneumonia, Psychiatric problem, S/P radiation therapy, Seasonal allergies, and Staphylococcal arthritis of left knee ([place] [address] has a past surgical history that includes Appendectomy;  Cholecystectomy; skin secondary to pain in L knee     AM-PAC Score     AM-PAC Inpatient Mobility without Stair Climbing Raw Score : 9 (04/02/21 1159)  AM-PAC Inpatient without Stair Climbing T-Scale Score : 32.44 (04/02/21 1159)  Mobility Inpatient CMS 0-100% Score: 76.07 (04/02/21 1159)  Mobility Inpatient without Stair CMS G-Code Modifier : CL (04/02/21 1159)     Goals  Short term goals  Time Frame for Short term goals: 7 days (4/7/2021)  Short term goal 1: Pt will perform bed mobility independently  -4/02 - mod A  Short term goal 2: Pt will perform transfers with LRAD and supervision   -4/02 mod/max A  Short term goal 3: Pt will ambulate 76' with LRAD and supervision   -4/02 unable to ambulate  Short term goal 4: Pt will perform 15-20 reps BLE exercises to improve BLE strength   -4/01  initiated  Patient Goals   Patient goals : \"to walk by myself\"    Plan    Plan  Times per week: 3-5x/week  Current Treatment Recommendations: Strengthening, Balance Training, Functional Mobility Training, Transfer Training, Gait Training, Endurance Training, Neuromuscular Re-education, Safety Education & Training, Patient/Caregiver Education & Training  Safety Devices  Type of devices:  All fall risk precautions in place, Bed alarm in place, Call light within reach, Gait belt, Patient at risk for falls, Left in bed, Nurse notified  Restraints  Initially in place: No     Therapy Time   Individual Concurrent Group Co-treatment   Time In 1120         Time Out 1145         Minutes 25         Timed Code Treatment Minutes: [address],   [facility]

## 2021-04-02 NOTE — ACP (ADVANCE CARE PLANNING)
Advance Care Planning Note    Name: Jesus Nick  YOB: 1925  MRN: [id number]  Admission Date: 3/30/2021  1:46 PM    Date of Discussion: 4/1/21    Active Diagnoses:  Syncope, bradycardia, peptic ulcer disease, hypertension, hiatal hernia,      These active diagnoses are of sufficient risk that focused discussion on advance care planning is indicated in order to allow the patient to thoughtfully consider personal goals of care; and, if situations arise that prevent the ability to personally give input, to ensure appropriate representation of their personal desires for different levels and agressiveness of care. Discussion:    Persons present and participating in discussion: Jazmin Crisostomo, Daughter     Patient's decisional capacity or surrogate: Patient is currently a full code and daughter and patient wish to continue this at present time. He has a satisfactory quality of life and continues to live at home independently with minimal assistance despite his age. The patient and family have no interest in extended care facility at this point in time and would choose to continue with this much care in the patient's own home as possible. .     Discussion in summary:     Code status: Full    Time Spent:     Total time spent face-to-face in education and discussion: 30 minutes.     Electronically signed by KAYLEEN Marroquin CNP on 4/2/2021 at 3:25 PM

## 2021-04-02 NOTE — CARE COORDINATION
[facility]    DC order noted, all docs needed have been faxed to [facility] for home care services.     Home care to see patient within 24-48 hrs    Larissa Witt RN, BSN CTN  [facility] [phone]

## 2021-04-02 NOTE — ADT AUTH CERT
Syncope - Care Day 3 (4/1/2021) by Sid Mooney RN       Review Status Review Entered   Completed 4/2/2021 15:06      Criteria Review      Care Day: 3 Care Date: 4/1/2021 Level of Care: Inpatient Floor    Guideline Day 2    Clinical Status    (X) * Hemodynamic stability    (X) * No acute cardiac or neurologic events    (X) * Mental status at baseline    (X) * Dangerous arrhythmia absent    (X) * No further syncope    (X) * No evidence of etiology requiring ongoing inpatient care or monitoring (eg, unstable cardiac rhythm or conduction defect)    (X) * Discharge plans and education understood    4/2/2021 3:06 PM EDT by Jose Lance      home w [facility]    Activity    (X) * Ambulatory or acceptable for next level of care    4/2/2021 3:06 PM EDT by Jose Lance      pt/ot    Routes    (X) * Oral hydration, medications, and diet    4/2/2021 3:06 PM EDT by Jose Lance      dc lopressor  dc ivf  cont prev po meds    Interventions    (X) Possible cardiac monitoring    4/2/2021 3:06 PM EDT by Jose Lance      continue    * Milestone   Additional Notes   4/1-       BP (!) 153/75   Pulse 64   Temp 95 °F (35 °C) (Axillary)   Resp 16      less freq pvc's, hr baseline 60's   No cp or sob      Am labs-    Wbc 6. H/h 13/39   K 4.5         ** patiens HR dropped while ambulating today, patient asymptomatic. Continue to hold bhumika agents. Hr 52's      Per cardiology -    Assessment:   Syncope: etiology unknown, but possibly related to HCTZ and new beta blocker   Sinus bradycardia: stable   PVCs: improved; frequent PVCs noted on previous admission last week   Mild AS   Mild MR/AI   HTN: uncontrolled   Normal LVEF on echo 3/2021       Plan:    1. Discontinue lopressor due to bradycardia, avoid bhumika agents   2. No lasix or HCTZ   3. Continue norvasc 2.5 mg daily   4.  Cardiac monitor at discharge for bradycardia/PVC burden      Cont prev meds- dc lopressor            Syncope - Care Day 2 (3/31/2021) by Stefano Tello RN       Review Status Review Entered   Completed 4/2/2021 08:36      Criteria Review      Care Day: 2 Care Date: 3/31/2021 Level of Care: Inpatient Floor    Guideline Day 2    Level Of Care    ( ) Complete discharge planning    4/2/2021 8:36 AM EDT by Pauly Dinh [place] with [facility]    Clinical Status    ( ) * Hemodynamic stability    4/2/2021 8:36 AM EDT by Zen Bailey      98.0  18  61  169/77  94%ra    ( ) * No acute cardiac or neurologic events    ( ) * Mental status at baseline    ( ) * Dangerous arrhythmia absent    4/2/2021 8:36 AM EDT by Pauly Dinh Cardiology is consulted    beta blocker is held    ( ) * No further syncope    ( ) * No evidence of etiology requiring ongoing inpatient care or monitoring (eg, unstable cardiac rhythm or conduction defect)    ( ) * Discharge plans and education understood    Activity    ( ) * Ambulatory or acceptable for next level of care    4/2/2021 8:36 AM EDT by Zen Bailey      PT eval am pac score is 16/24    Routes    (X) * Oral hydration, medications, and diet    4/2/2021 8:36 AM EDT by Zen Bailey      prednisone 10mg qd, flomax 0.4mg qd, bevespi inhaler 2 puffs bid    * Milestone   Additional Notes   3/31       Per Cardio:   1. Sinus bradycardia   2. Syncope   3. HTN:   4. PVC: was in frequent trigeminy on last admission   5. Hyponatremia   6. Hypokalemia       PLAN   1.  Syncope: appears to be miscommunication as HCTZ at D/c but unknown to cards. [facility] I do feel this was appropriate given HTN upon d/c the patient proven unable to tolerate               - in face of new BB unlikely able to mount reflex and led to syncope   2.  Patient was tolerating Lopressor during last hospitalization without issues.                 - Given high burden of trigeminy PVC felt that risk for heart failure progression was significant and required beta-blocker treatment               -On telemetry, currently no further PVCs and now somewhat bradycardic in the lower 50s, hold beta-blocker               -If he goes home without a beta-blocker I would place a 2-week monitor to assess PVC burden   3. HOLD LASIX and HCTZ.             - suspect long term unlikely to tolerate HCTZ               - at time of last D/c pt was becoming hypetensive so may need alternative agent at slow start, ? norvasc 2.5mg if SBP >130   4.  Replete K >4

## 2021-04-02 NOTE — PROGRESS NOTES
[address]   Daily Progress Note    Admit Date:  3/30/2021  HPI:    Chief Complaint   Patient presents with    Loss of Consciousness     per pt family member he passed out earlier unaware of how long he was out/seen here last week for heart issues\"      Andreas Stoll presented with syncope. Felt related to bradycardia. He was recently hospitalized with PNA and noted trigeminy, was started on metoprolol 12.5 mg bid. Subjective:  Mr. Delia Flower sitting up in bed, appetite is good. Denies chest pain, shortness of breath or palpitations. Objective:   Patient Vitals for the past 24 hrs:   BP Temp Temp src Pulse Resp SpO2 Weight   04/02/21 0801 -- -- -- -- -- 93 % --   04/02/21 0612 -- -- -- -- -- -- 146 lb 9.7 oz (66.5 kg)   04/02/21 0555 (!) 166/75 98 °F (36.7 °C) Oral 73 18 95 % --   04/02/21 0554 (!) 166/75 -- -- -- -- (!) 83 % --   04/01/21 2320 (!) 154/79 98.1 °F (36.7 °C) Oral 64 18 91 % --   04/01/21 2015 139/69 97.6 °F (36.4 °C) Oral 74 18 94 % --   04/01/21 1650 -- 98.1 °F (36.7 °C) Oral -- -- -- --   04/01/21 1552 -- -- -- 64 -- 95 % --   04/01/21 1550 -- -- -- 64 -- 93 % --   04/01/21 1530 (!) 153/75 98.1 °F (36.7 °C) Axillary 84 16 (!) 80 % --   04/01/21 1245 114/69 -- -- 63 -- 98 % --   04/01/21 1244 114/69 97.4 °F (36.3 °C) Oral 60 16 97 % --   04/01/21 0917 (!) 177/76 97.6 °F (36.4 °C) Axillary 68 16 93 % --   04/01/21 0831 -- -- -- -- 16 98 % --       Intake/Output Summary (Last 24 hours) at 4/2/2021 0828  Last data filed at 4/1/2021 1815  Gross per 24 hour   Intake 270 ml   Output 650 ml   Net -380 ml     Wt Readings from Last 3 Encounters:   04/02/21 146 lb 9.7 oz (66.5 kg)   03/24/21 194 lb 0.1 oz (88 kg)   05/23/17 226 lb 14.4 oz (102.9 kg)         ASSESSMENT:   1. Syncope: suspect bradycardia vs diuretic use  2. HTN: overall stable, allow some elevation to prevent lightheadedness and falls  3. Sinus bradycardia: frequent PVC's/ trigeminy previously, minimal now  4.  Hypokalemia, systolic function with ejection fraction of   55-60%. Definity echo contrast was used. No regional wall motion abnormalites are seen. Grade I diastolic dysfunction with normal filling pressure. Compared to previous study from 5- no changes noted in left   ventricular function. Mild mitral and aortic regurgitation. Mild aortic stenosis. Systolic pulmonic artery pressure (SPAP) is normal estimated at 28 mmHg   (Right atrial pressure of 3 mmHg).

## 2021-04-03 LAB
APPEARANCE FLUID: NORMAL
CELL COUNT FLUID TYPE: NORMAL
CLOT EVALUATION: NORMAL
COLOR FLUID: YELLOW
MACROPHAGE FLUID: 26 %
NEUTROPHIL, FLUID: 74 %
NUCLEATED CELLS FLUID: NORMAL /CUMM
NUMBER OF CELLS COUNTED FLUID: 100
RBC FLUID: NORMAL /CUMM
URIC ACID, SERUM: 4.6 MG/DL (ref 3.5–7.2)

## 2021-04-03 PROCEDURE — 84550 ASSAY OF BLOOD/URIC ACID: CPT

## 2021-04-03 PROCEDURE — 87205 SMEAR GRAM STAIN: CPT

## 2021-04-03 PROCEDURE — 89060 EXAM SYNOVIAL FLUID CRYSTALS: CPT

## 2021-04-03 PROCEDURE — 94640 AIRWAY INHALATION TREATMENT: CPT

## 2021-04-03 PROCEDURE — 6370000000 HC RX 637 (ALT 250 FOR IP): Performed by: HOSPITALIST

## 2021-04-03 PROCEDURE — 97116 GAIT TRAINING THERAPY: CPT

## 2021-04-03 PROCEDURE — 6360000002 HC RX W HCPCS: Performed by: HOSPITALIST

## 2021-04-03 PROCEDURE — 0S9D3ZZ DRAINAGE OF LEFT KNEE JOINT, PERCUTANEOUS APPROACH: ICD-10-PCS | Performed by: ORTHOPAEDIC SURGERY

## 2021-04-03 PROCEDURE — 97110 THERAPEUTIC EXERCISES: CPT

## 2021-04-03 PROCEDURE — 6370000000 HC RX 637 (ALT 250 FOR IP): Performed by: NURSE PRACTITIONER

## 2021-04-03 PROCEDURE — 36415 COLL VENOUS BLD VENIPUNCTURE: CPT

## 2021-04-03 PROCEDURE — 2500000003 HC RX 250 WO HCPCS: Performed by: PHYSICIAN ASSISTANT

## 2021-04-03 PROCEDURE — 89051 BODY FLUID CELL COUNT: CPT

## 2021-04-03 PROCEDURE — 1200000000 HC SEMI PRIVATE

## 2021-04-03 PROCEDURE — 2580000003 HC RX 258: Performed by: HOSPITALIST

## 2021-04-03 PROCEDURE — 97530 THERAPEUTIC ACTIVITIES: CPT

## 2021-04-03 PROCEDURE — 87070 CULTURE OTHR SPECIMN AEROBIC: CPT

## 2021-04-03 PROCEDURE — 99222 1ST HOSP IP/OBS MODERATE 55: CPT | Performed by: PHYSICIAN ASSISTANT

## 2021-04-03 PROCEDURE — 20610 DRAIN/INJ JOINT/BURSA W/O US: CPT | Performed by: PHYSICIAN ASSISTANT

## 2021-04-03 RX ORDER — LIDOCAINE HYDROCHLORIDE 10 MG/ML
5 INJECTION, SOLUTION INFILTRATION; PERINEURAL ONCE
Status: COMPLETED | OUTPATIENT
Start: 2021-04-03 | End: 2021-04-03

## 2021-04-03 RX ORDER — LIDOCAINE HYDROCHLORIDE 10 MG/ML
INJECTION, SOLUTION INFILTRATION; PERINEURAL
Status: DISPENSED
Start: 2021-04-03 | End: 2021-04-03

## 2021-04-03 RX ADMIN — Medication 10 ML: at 20:10

## 2021-04-03 RX ADMIN — ATORVASTATIN CALCIUM 10 MG: 10 TABLET, FILM COATED ORAL at 09:48

## 2021-04-03 RX ADMIN — TIMOLOL MALEATE 1 DROP: 5 SOLUTION OPHTHALMIC at 09:51

## 2021-04-03 RX ADMIN — ENOXAPARIN SODIUM 40 MG: 40 INJECTION SUBCUTANEOUS at 09:48

## 2021-04-03 RX ADMIN — LIDOCAINE HYDROCHLORIDE 5 ML: 10 INJECTION, SOLUTION INFILTRATION; PERINEURAL at 10:04

## 2021-04-03 RX ADMIN — GLYCOPYRROLATE AND FORMOTEROL FUMARATE 2 PUFF: 9; 4.8 AEROSOL, METERED RESPIRATORY (INHALATION) at 08:25

## 2021-04-03 RX ADMIN — ACETAMINOPHEN 650 MG: 325 TABLET ORAL at 13:57

## 2021-04-03 RX ADMIN — AMLODIPINE BESYLATE 2.5 MG: 2.5 TABLET ORAL at 09:48

## 2021-04-03 RX ADMIN — PREDNISONE 10 MG: 10 TABLET ORAL at 09:48

## 2021-04-03 RX ADMIN — Medication 10 ML: at 09:49

## 2021-04-03 RX ADMIN — ASPIRIN 81 MG: 81 TABLET, COATED ORAL at 09:48

## 2021-04-03 RX ADMIN — GLYCOPYRROLATE AND FORMOTEROL FUMARATE 2 PUFF: 9; 4.8 AEROSOL, METERED RESPIRATORY (INHALATION) at 20:57

## 2021-04-03 RX ADMIN — FAMOTIDINE 20 MG: 20 TABLET ORAL at 09:48

## 2021-04-03 RX ADMIN — TAMSULOSIN HYDROCHLORIDE 0.4 MG: 0.4 CAPSULE ORAL at 09:48

## 2021-04-03 RX ADMIN — TIMOLOL MALEATE 1 DROP: 5 SOLUTION OPHTHALMIC at 20:07

## 2021-04-03 RX ADMIN — MAGNESIUM GLUCONATE 500 MG ORAL TABLET 400 MG: 500 TABLET ORAL at 09:48

## 2021-04-03 ASSESSMENT — PAIN DESCRIPTION - LOCATION: LOCATION: LEG

## 2021-04-03 ASSESSMENT — PAIN - FUNCTIONAL ASSESSMENT: PAIN_FUNCTIONAL_ASSESSMENT: PREVENTS OR INTERFERES SOME ACTIVE ACTIVITIES AND ADLS

## 2021-04-03 NOTE — PROGRESS NOTES
Hospitalist Progress Note      PCP: Enoc Clinton MD    Date of Admission: 3/30/2021    Chief Complaint:    Loss of Consciousness       per pt family member he passed out earlier unaware of how long he was out/seen here last week for heart issues\"         Hospital Course: 81 yo male who remains independent in his own home presented to the ED for evaluation of syncope. Pt is < 1 week post hospitalization for Trigeminy and dyspnea and was treated with Ceftin for possible bronchopneumonia     ED course EKG was obtained and notable for sinus bradycardia with first-degree AV block, RBBB, and left anterior fascicular block. CXR was notable for bibasilar hypoaeration but otherwise interpreted as normal.      Patient admitted for syncope and collapse with notable bradycardia with bifascicular block. Subjective: EMR and notes reviewed pt seen and examined. HR has remained stable overnight. Yesterday developed right knee pain and effusion.  See by Serge Goodman - tapped this am, reports pain is much better but has not ambulated yet     Thank you for  Medications:  Reviewed    Infusion Medications    sodium chloride       Scheduled Medications    lidocaine  5 mL Intradermal Once    lidocaine        amLODIPine  2.5 mg Oral Daily    aspirin  81 mg Oral Daily    glycopyrrolate-formoterol  2 puff Inhalation BID    timolol  1 drop Both Eyes BID    tamsulosin  0.4 mg Oral Daily    atorvastatin  10 mg Oral Daily    predniSONE  10 mg Oral Daily    magnesium oxide  400 mg Oral Daily    sodium chloride flush  10 mL Intravenous 2 times per day    enoxaparin  40 mg Subcutaneous Daily    famotidine  20 mg Oral Daily     PRN Meds: oxyCODONE-acetaminophen **OR** oxyCODONE-acetaminophen, acetaminophen, sodium chloride, sennosides-docusate sodium, sodium chloride flush, sodium chloride, potassium chloride **OR** potassium alternative oral replacement **OR** potassium chloride, magnesium sulfate, promethazine **OR** ondansetron, senna, acetaminophen **OR** acetaminophen      Intake/Output Summary (Last 24 hours) at 4/3/2021 0839  Last data filed at 4/3/2021 0615  Gross per 24 hour   Intake --   Output 350 ml   Net -350 ml       Physical Exam Performed:    BP (!) 159/71   Pulse 66   Temp 98.6 °F (37 °C) (Oral)   Resp 14   Wt 152 lb 1.9 oz (69 kg)   SpO2 95%   BMI 23.13 kg/m²     General appearance: No apparent distress, appears stated age and cooperative. Impressive for his age   [de-identified]: Pupils equal, round, and reactive to light. Conjunctivae/corneas clear. Neck: Supple, with full range of motion. No jugular venous distention. Trachea midline. Respiratory:  Normal respiratory effort. Clear to auscultation, bilaterally without Rales/Wheezes/Rhonchi. Cardiovascular: Regular rate and rhythm with normal S1/S2 without murmurs, rubs or gallops. Abdomen: Soft, non-tender, non-distended with normal bowel sounds. Musculoskeletal: No clubbing, cyanosis or edema bilaterally. Left knee with edema and pain no erythema   Skin: Skin color, texture, turgor normal.  No rashes or lesions. Neurologic:  Neurovascularly intact without any focal sensory/motor deficits. Cranial nerves: II-XII intact, grossly non-focal.  Psychiatric: Alert and oriented, thought content appropriate, normal insight  Capillary Refill: Brisk,< 3 seconds   Peripheral Pulses: +2 palpable, equal bilaterally       Labs:   Recent Labs     04/01/21  0543   WBC 6.9   HGB 13.6   HCT 39.1*        Recent Labs     04/01/21  0543      K 4.5   CL 99   CO2 32   BUN 13   CREATININE 0.6*   CALCIUM 9.2     Recent Labs     04/01/21  0543   AST 26   ALT 20   BILITOT 0.6   ALKPHOS 45     No results for input(s): INR in the last 72 hours. No results for input(s): Burnice Golden in the last 72 hours.     Urinalysis:      Lab Results   Component Value Date    NITRU Negative 03/30/2021    BLOODU Negative 03/30/2021    SPECGRAV 1.025 03/30/2021    GLUCOSEU Negative 03/30/2021       Radiology:  XR CHEST PORTABLE   Final Result   No acute process. Bibasilar hypoaeration                 Assessment/Plan:    Active Hospital Problems    Diagnosis    Syncope and collapse [R55]    Hyponatremia [E87.1]    Hypokalemia [E87.6]    PVC's (premature ventricular contractions) [I49.3]    HTN (hypertension) [I10]    Hiatal hernia [K44.9]    PUD (peptic ulcer disease) [K27.9]    Sinus bradycardia [R00.1]     Syncope and collapse   -Suspect may be secondary to recent addition of Lopressor 12.5 mg twice daily, which was started to address his trigeminy or perhaps 2/2  Resuming lasix and hctz as OP setting, these were held while IP w/o adverse effects.  PT was given IVF in the ED   -Beta-blocker held  -consult cardiology for further recommendations  -Fall and seizure precautions in place  -Orthostatic vital signs negative  -Strict I's and O's  -ECHO completed 3/24/2021   -PT/OT to evaluate and treat  - tele      Hyponatremia/hypokalemia  -Patient with normal sodium and potassium levels just 7 days prior to admission  -Review of epic medical record reveals that both his HCTZ and Lasix were HELD during his recent hospital stay with no adverse effects; review of medications for discharge reveals that BOTH diuretics were resumed after leaving  - IVF given   - replace and monitor     COPD  -Patient stable on room air  -Recommend incentive spirometry every 4 hours while awake  -Continue Anoro Ellipta inhaler per home dosage  -Patient on prednisone 10 mg daily which has been confirmed with his PCP and family members; continue at current dosage for now     History of PUD  -Continue ASA but not sure enteric-coated  -NSAIDs currently held as they may be contributing to his hyponatremia  -Initiate H2 blocker Pepcid once daily (rather than PPI as latter agent may worsen hyponatremia)    HTN- sub therapeutic- improved with Norvasc   - added 2.5 Norvasc monitor      Left knee edema and pain- possible irritation from aggressive ambulation with therapy   - + hx of gout   - will have pt ambulate and assess if the pain causes difficulty with ambulation, did poorly and had significant pain, Hold DC   - Ortho aware will asp in am   - aspiration Vs ace wrap and medical mgt   -4/3 aspirated this am - sent for culture       DVT Prophylaxis: lovenox   Diet: DIET CARDIAC; Carb Control: 4 carb choices (60 gms)/meal; No Added Salt (3-4 GM);  Daily Fluid Restriction: 2000 ml  Code Status: Full Code    PT/OT Eval Status: ordered rec's for HHT     Dispo - possible home with [place]  over the weekend will see how knee does and labs has hx of jlint infectious     Aby Villanueva, KAYLEEN - CNP

## 2021-04-03 NOTE — CONSULTS
Department of Orthopedic Surgery  Physician Assistant   Consult Note        Reason for Consult:  Left knee effusion  Requesting Physician: Raquel Campo MD  Date of Service: 4/3/2021 9:28 AM    CHIEF COMPLAINT:  As Above    History Obtained From:  patient, electronic medical record    HISTORY OF PRESENT ILLNESS:                The patient is a 80 y.o. male who presents with above chief complaint. Pt admitted for syncopal workup and was scheduled to d/c home yesterday when he noted left knee effusion. He did ambulate with therapy on Thursday and did not note any pain during this. He has increased pain today and swelling. He has been unable to ambulate d/t pain and fear of falling. Pt states he has a hx of gout that normally effects his knees. He does not effusions in the past.  No fevers or chills noted. Otherwise, feeling well. Of note, pt does have hx of bilateral knee infections with staph epi in 2017. Requiring I&D with Dr. Parish Carballo. Past Medical History:        Diagnosis Date    Cancer [facility])     prostate  HAD SEED IMPLANTS    GERD (gastroesophageal reflux disease)     Glaucoma     History of stomach ulcers 1993+/-    Hyperlipidemia     Hypertension     Nausea alone     Pneumonia     1944    Psychiatric problem     anxiety    S/P radiation therapy 2007    Prostate seeds    Seasonal allergies     Staphylococcal arthritis of left knee ([facility] [address] 5/9/2017     Past Surgical History:        Procedure Laterality Date    APPENDECTOMY      CATARACT REMOVAL WITH IMPLANT Right 12/01/2016    CATARACT REMOVAL WITH IMPLANT Left 12/13/2016    CHOLECYSTECTOMY      COLONOSCOPY  10/20/1999    polyp    COLONOSCOPY  01/16/2002    ?     ENDOSCOPY, COLON, DIAGNOSTIC      EYE SURGERY      cataract implants bilat eyes    KNEE SURGERY Right 05/12/2017    video arthroscopy and I&D right knee, partial medial and lateral meniscectomy right knee    OTHER SURGICAL HISTORY  7/27/2012    excision squamouscell carcinoma     right cheek     OTHER SURGICAL HISTORY Left 05/09/2017    Irrigation and Drainage of left knee    PROSTATE BIOPSY  2007    Cancer treated with seeds    SKIN BIOPSY      cyst    UPPER GASTROINTESTINAL ENDOSCOPY  04/30/2004    Hiatal Hernia, Gastritis    UPPER GASTROINTESTINAL ENDOSCOPY  12/12/2007    Hiatal Hernia, Gastritis    UPPER GASTROINTESTINAL ENDOSCOPY  4/19/2013    gastritis    WRIST SURGERY Right 05/12/2017    I&D right wrist         Medications Prior to Admission:   Prior to Admission medications    Medication Sig Start Date End Date Taking? Authorizing Provider   amLODIPine (NORVASC) 2.5 MG tablet Take 1 tablet by mouth daily 4/2/21  Yes Edilberto Kimble, APRN - CNP   magnesium oxide (MAG-OX) 400 (241.3 Mg) MG TABS tablet Take 1 tablet by mouth daily 3/25/21  Yes Britt Alcantar MD   colchicine (COLCRYS) 0.6 MG tablet  2/3/21  Yes Historical Provider, MD   predniSONE (DELTASONE) 10 MG tablet  2/15/21  Yes Historical Provider, MD   naproxen (NAPROSYN) 500 MG tablet TAKE 1 TABLET TWICE A DAY  (WITH MEALS) 12/31/20  Yes Historical Provider, MD   albuterol sulfate HFA (PROAIR HFA) 108 (90 Base) MCG/ACT inhaler Inhale 2 puffs into the lungs every 4 hours as needed 3/18/21  Yes Historical Provider, MD   acetaminophen (TYLENOL) 325 MG tablet Take 650 mg by mouth every 6 hours as needed for Pain   Yes Historical Provider, MD   ketoconazole (NIZORAL) 2 % cream Apply topically daily. 5/23/17  Yes Lissette Dorsey MD   sennosides-docusate sodium (SENOKOT-S) 8.6-50 MG tablet Take 2 tablets by mouth daily as needed for Constipation 5/16/17  Yes Colette Randle MD   aspirin 81 MG EC tablet Take 81 mg by mouth daily. Yes Historical Provider, MD   simvastatin (ZOCOR) 10 MG tablet Take 10 mg by mouth nightly.      Yes Historical Provider, MD   timolol (TIMOPTIC) 0.5 % ophthalmic solution Place 1 drop into both eyes 2 times daily    Yes Historical Provider, MD   umeclidinium-vilanterol [facility] ELLIPTA) 62.5-25 MCG/INH AEPB inhaler Inhale 1 puff into the lungs daily 3/18/21   Historical Provider, MD   sodium chloride (OCEAN, BABY AYR) 0.65 % nasal spray 2 sprays by Nasal route as needed for Congestion (every  2 hours prn)    Historical Provider, MD   promethazine (PHENERGAN) 25 MG tablet Take 0.5-1 tablets by mouth every 6 hours as needed for Nausea. 4/20/13   Dinorah Florentino MD   tamsulosin (FLOMAX) 0.4 MG capsule Take 0.4 mg by mouth daily. Historical Provider, MD       Allergies:  Neomycin-polymyxin-gramicidin and Neosporin [bacitracin-neomycin-polymyxin]    Social History:    Tobacco:  reports that he has quit smoking. His smoking use included cigars. He quit after 38.00 years of use. He has never used smokeless tobacco.   Alcohol:  reports no history of alcohol use. Illicit Drug: No  Family History:       Problem Relation Age of Onset   [facility] Cancer Daughter         ovarian    Stroke Mother     High Blood Pressure Father     Other Father         circulation issues       REVIEW OF SYSTEMS:    CONSTITUTIONAL:  negative  MUSCULOSKELETAL:  positive for  pain  All other systems reviewed and negative    PHYSICAL EXAM:    awake, alert, cooperative, no apparent distress, and appears stated age  MUSCULOSKELETAL:  there is no redness, warmth, or swelling of the joints  full range of motion noted  motor strength is 5 out of 5 all extremities bilaterally  tone is normal  with exception of  LEFT KNEE:  redness absent  warmth present  swelling present with suprapatellar joint effusion  tenderness absent  range of motion decreased d/t swelling, but able to actively flex knee to 45 degrees and extend fully. Able to perform a modified SLR. Painless passive ROM  NVI to left LE  No abrasions or ecchymosis to knee noted.      DATA:    CBC:   Recent Labs     04/01/21  0543   WBC 6.9   HGB 13.6        BMP:    Recent Labs     04/01/21  0543      K 4.5   CL 99   CO2 32   BUN 13   CREATININE 0.6* GLUCOSE 106*     INR: No results for input(s): INR in the last 72 hours. Radiology:   XR CHEST PORTABLE   Final Result   No acute process. Bibasilar hypoaeration                IMPRESSION/RECOMMENDATIONS:    Assessment:   Left knee effusion, probable gout flare    Plan:  1)   Discussed with the patient about his knee effusion and likely gout vs arthritic flare. Knee does not appear septic at this time. We discussed conservative measures vs aspiration. D/t patient being unable to ambulate d/t effusion and significant change from previous, pt has elected for aspiration. Please see aspiration note for details. Aspirated knee at bedside this am, 37 cc of cloudy synovial fluid obtained. Sent to lab for cell count, culture, crystal.  Pt tolerated well. 2)   Reviewed with Anny Sandy NP  3)   Labs and imaging reviewed  4)   Discussed with Dr. Nav Sanchez and evaluated at bedside. If cell count acceptable and pt able to ambulate today, okay to d/c from ortho standpoint. Will follow for results. 5)  Updated RN        Thank you for the opportunity to consult on this patient.     Beau Ko PA-C

## 2021-04-03 NOTE — PROCEDURES
Romie Dietz is a 80 y.o. male patient. 1. Syncope and collapse    2. Hypokalemia    3. Sinus bradycardia    4. PVC's (premature ventricular contractions)      Past Medical History:   Diagnosis Date    Cancer ([facility] [address]     prostate  HAD SEED IMPLANTS    GERD (gastroesophageal reflux disease)     Glaucoma     History of stomach ulcers 1993+/-    Hyperlipidemia     Hypertension     Nausea alone     Pneumonia     1944    Psychiatric problem     anxiety    S/P radiation therapy 2007    Prostate seeds    Seasonal allergies     Staphylococcal arthritis of left knee ([facility] [address] 5/9/2017     Blood pressure (!) 159/71, pulse 66, temperature 98.6 °F (37 °C), temperature source Oral, resp. rate 14, weight 152 lb 1.9 oz (69 kg), SpO2 95 %. Arthrocentesis    Date/Time: 4/3/2021 9:34 AM  Performed by: MOHAMUD Lujan  Authorized by: MOHAMUD Lujan   Consent: Verbal consent obtained. Risks and benefits: risks, benefits and alternatives were discussed  Consent given by: patient  Patient understanding: patient states understanding of the procedure being performed  Relevant documents: relevant documents present and verified  Test results: test results available and properly labeled  Site marked: the operative site was marked  Imaging studies: imaging studies available  Patient identity confirmed: verbally with patient and arm band  Time out: Immediately prior to procedure a \"time out\" was called to verify the correct patient, procedure, equipment, support staff and site/side marked as required. Indications: joint swelling   Body area: knee  Joint: left knee  Local anesthesia used: yes    Anesthesia:  Local anesthesia used: yes  Local Anesthetic: lidocaine 1% without epinephrine  Anesthetic total: 5 mL  Preparation: Patient was prepped and draped in the usual sterile fashion.   Needle size: 18 G  Ultrasound guidance: no  Approach: superior  Aspirate: cloudy and yellow  Aspirate amount: 37 mL  Patient tolerance: patient tolerated the procedure well with no immediate complications  Comments: Pt was instructed on risks and benefits of aspiration of the left knee. Risks include bleeding with hematoma formation, infection from injection, local inflammation and pain from injection. Pt voiced understanding of these and wishes to proceed with the aspiration. The superior lateral aspect of the left knee was prepped with a Betadine swab. Following this, under sterile conditions a 25g needle was introduced into the soft tissue and 5ml of 1% Lidocaine without epi was injected all the way to the joint capsule to attain adequate local anesthesia. At this point I inserted a 18g needle into the skin and advanced it to the joint capsule without any problem and aspirated 37 cc of cloudy synovial fluid. Pt tolerated the procedure as well. Needle was withdrawn and area was cleaned with an alcohol wipe and sterile guaze. Band aid was then placed and ace bandage to knee. No complications noted.     MOHAMUD Dougherty PA-C  4/3/2021

## 2021-04-03 NOTE — PROGRESS NOTES
Physical Therapy  Facility/Department: [facility] C5 - MED SURG/ORTHO  Daily Treatment Note  NAME: Parveen Stoll  : 10/29/1925  MRN: [id number]    Date of Service: 4/3/2021    Discharge Recommendations:  Subacute/Skilled Nursing Facility   PT Equipment Recommendations  Equipment Needed: No  Other: defer to patient's facility. If pt is unable to be seen after this session, please let this note serve as discharge summary. Please see case management note for discharge disposition. Thank you. Barriers to home discharge:   [x] Reported available assist at home upon discharge limited: Patient lives home alone. Assessment   Body structures, Functions, Activity limitations: Decreased functional mobility ; Decreased strength;Decreased safe awareness;Decreased endurance;Decreased balance; Increased pain;Decreased posture;Decreased cognition  Assessment: Patient is now status post aspiration of left knee. Per patient's RN cultures of his L knee aspiration are pending. Patient is significantly below his prior level of function. Today patient required min assist for bed mobility, mod assist for sit <> stand transfers and max assist to ambulate up to 4 feet with a rolling walker. Patient continues to present with the therapy deficits listed above. PT recommends that this patient receive skilled PT in the SNF setting, when medically stable, in order to address these deficits and to help him maximize his safety and independence with all functional mobility. Patient currently lives at home alone and as a result he currently cannot discharge safely to home. PT to continue to follow. Treatment Diagnosis: impaired functional mobility  Prognosis: Good  Decision Making: Low Complexity  PT Education: Goals; General Safety;Gait Training;PT Role;Plan of Care;Transfer Training;Disease Specific Education  Patient Education: Educated on safety with transfers with L knee pain and increased assist level with transfers.  educated patient on importance of increased mobility and importance of completing his exercises - pt verbalized understanding  Barriers to Learning: short term memory  REQUIRES PT FOLLOW UP: Yes  Activity Tolerance  Activity Tolerance: Patient limited by endurance  Activity Tolerance: Vitals: 118/66 67 BPM     Patient Diagnosis(es): The primary encounter diagnosis was Syncope and collapse. Diagnoses of Hypokalemia, Sinus bradycardia, PVC's (premature ventricular contractions), and Swelling of knee joint, unspecified laterality were also pertinent to this visit. has a past medical history of Cancer ([facility] [address] GERD (gastroesophageal reflux disease), Glaucoma, History of stomach ulcers, Hyperlipidemia, Hypertension, Nausea alone, Pneumonia, Psychiatric problem, S/P radiation therapy, Seasonal allergies, and Staphylococcal arthritis of left knee ([address] has a past surgical history that includes Appendectomy; Cholecystectomy; skin biopsy; other surgical history (7/27/2012); Colonoscopy (10/20/1999); Colonoscopy (01/16/2002); Upper gastrointestinal endoscopy (04/30/2004); Upper gastrointestinal endoscopy (12/12/2007); Prostate biopsy (2007); Upper gastrointestinal endoscopy (4/19/2013); Cataract removal with implant (Right, 12/01/2016); Cataract removal with implant (Left, 12/13/2016); other surgical history (Left, 05/09/2017); Wrist surgery (Right, 05/12/2017); knee surgery (Right, 05/12/2017); eye surgery; and Endoscopy, colon, diagnostic. Restrictions  Restrictions/Precautions  Restrictions/Precautions: General Precautions, Fall Risk  Position Activity Restriction  Other position/activity restrictions: up with assistance  Subjective   General  Chart Reviewed: Yes  Response To Previous Treatment: Patient with no complaints from previous session. Family / Caregiver Present: Yes(patient's daughter.)  Subjective  Subjective: Patient agreed to participate. General Comment  Comments: Cleared for therapy by patient's RN.  Supine in bed upon arrival.  Pain Screening  Patient Currently in Pain: Yes  Pain Assessment  Pain Assessment: 0-10  Pain Level: 7  Pain Type: Acute pain  Pain Location: Leg  Pain Orientation: Left  Functional Pain Assessment: Prevents or interferes some active activities and ADLs  Non-Pharmaceutical Pain Intervention(s): Ambulation/Increased Activity;Relaxation techniques;Repositioned  Response to Pain Intervention: Patient Satisfied  Vital Signs  Patient Currently in Pain: Yes       Orientation     Cognition   Cognition  Overall Cognitive Status: Exceptions  Arousal/Alertness: Delayed responses to stimuli  Following Commands: Follows multistep commands with repitition  Attention Span: Attends with cues to redirect  Memory: Decreased short term memory  Problem Solving: Assistance required to correct errors made  Insights: Decreased awareness of deficits  Initiation: Requires cues for some  Cognition Comment: patient is slightly impulsive. cueing needed for safety. Objective   Bed mobility  Supine to Sit: Minimal assistance  Sit to Supine: Unable to assess(patient seated in chair upon entry of therapy staff.)  Scooting: Contact guard assistance(to scoot to edge of bed)  Comment: head of bed elevated  Transfers  Sit to Stand: Moderate Assistance  Stand to sit: Moderate Assistance  Bed to Chair: Maximum assistance  Comment: From EOB to RW  Ambulation  Ambulation?: Yes  More Ambulation?: No  Ambulation 1  Surface: level tile  Device: Rolling Walker  Assistance: Maximum assistance  Quality of Gait: very unsteady gait. left knee buckling. 1 loss of balance. max assist to correct. antalgic gait pattern. decreased weight bearing through LLE. Gait Deviations: Slow Afia; Increased RYAN; Decreased step length;Decreased step height  Distance: bed to chair ~2 feet. forward and backward 4 feet x 2. Comments: No complaints of shortness of breath, chest pain or dizziness.      Balance  Posture: Fair  Sitting - Static: Good  Sitting - Dynamic: Good  Standing - Static: Poor  Standing - Dynamic: Poor  Comments: with RW  Exercises  Heelslides: 2 x 15 bilateral (pillowcase under foot)  Hip Flexion: 1 x 15 bilateral  Hip Abduction: 1 x 15 bilateral (pillow case placed under LLE)  Ankle Pumps: 1 x 15 bilateral  Comments: cueing for sequencing and technique. Other exercises  Other exercises?: Yes  Other exercises 1: Standing marching: 1 x 10 (1 loss of balance. mod-max assist to correct). Other exercises 2: 5 time sit <> stand mod assist for each stand. AM-PAC Score     AM-PAC Inpatient Mobility without Stair Climbing Raw Score : 11 (04/03/21 1614)  AM-PAC Inpatient without Stair Climbing T-Scale Score : 35.66 (04/03/21 1614)  Mobility Inpatient CMS 0-100% Score: 67.36 (04/03/21 1614)  Mobility Inpatient without Stair CMS G-Code Modifier : CL (04/03/21 1614)       Goals  Short term goals  Time Frame for Short term goals: 7 days (4/7/2021)  Short term goal 1: Pt will perform bed mobility independently  -4/03 - min A  Short term goal 2: Pt will perform transfers with LRAD and supervision   -4/03 mod/max A  Short term goal 3: Pt will ambulate 76' with LRAD and supervision   -4/03 4 feet x 2 with max assist and RW  Short term goal 4: Pt will perform 15-20 reps BLE exercises to improve BLE strength   -4/03 progressing  Patient Goals   Patient goals : \"to walk by myself\"    Plan    Plan  Times per week: 5-7/week  Plan weeks: 1 week 4/7  Current Treatment Recommendations: Strengthening, Balance Training, Functional Mobility Training, Transfer Training, Gait Training, Endurance Training, Neuromuscular Re-education, Safety Education & Training, Patient/Caregiver Education & Training  Safety Devices  Type of devices:  All fall risk precautions in place, Call light within reach, Gait belt, Patient at risk for falls, Nurse notified, Left in chair, Chair alarm in place(patient's daughter at bedside)  Restraints  Initially in place: No     Therapy Time   Individual Concurrent Group Co-treatment   Time In 8235         Time Out 1555         Minutes 40         Timed Code Treatment Minutes: [address] Rosaura Pal, PT

## 2021-04-04 LAB
HCT VFR BLD CALC: 38 % (ref 40.5–52.5)
HEMOGLOBIN: 13 G/DL (ref 13.5–17.5)
MCH RBC QN AUTO: 32.2 PG (ref 26–34)
MCHC RBC AUTO-ENTMCNC: 34.1 G/DL (ref 31–36)
MCV RBC AUTO: 94.6 FL (ref 80–100)
PDW BLD-RTO: 13.7 % (ref 12.4–15.4)
PLATELET # BLD: 262 K/UL (ref 135–450)
PMV BLD AUTO: 7.4 FL (ref 5–10.5)
RBC # BLD: 4.02 M/UL (ref 4.2–5.9)
REASON FOR REJECTION: NORMAL
REJECTED TEST: NORMAL
WBC # BLD: 11.9 K/UL (ref 4–11)

## 2021-04-04 PROCEDURE — 36415 COLL VENOUS BLD VENIPUNCTURE: CPT

## 2021-04-04 PROCEDURE — 6360000002 HC RX W HCPCS: Performed by: HOSPITALIST

## 2021-04-04 PROCEDURE — 6370000000 HC RX 637 (ALT 250 FOR IP): Performed by: NURSE PRACTITIONER

## 2021-04-04 PROCEDURE — 97530 THERAPEUTIC ACTIVITIES: CPT

## 2021-04-04 PROCEDURE — 6370000000 HC RX 637 (ALT 250 FOR IP): Performed by: HOSPITALIST

## 2021-04-04 PROCEDURE — APPNB30 APP NON BILLABLE TIME 0-30 MINS: Performed by: PHYSICIAN ASSISTANT

## 2021-04-04 PROCEDURE — 2580000003 HC RX 258: Performed by: HOSPITALIST

## 2021-04-04 PROCEDURE — 85027 COMPLETE CBC AUTOMATED: CPT

## 2021-04-04 PROCEDURE — 97535 SELF CARE MNGMENT TRAINING: CPT

## 2021-04-04 PROCEDURE — 94640 AIRWAY INHALATION TREATMENT: CPT

## 2021-04-04 PROCEDURE — 1200000000 HC SEMI PRIVATE

## 2021-04-04 RX ORDER — COLCHICINE 0.6 MG/1
0.6 TABLET ORAL DAILY
Status: DISCONTINUED | OUTPATIENT
Start: 2021-04-04 | End: 2021-04-06 | Stop reason: HOSPADM

## 2021-04-04 RX ORDER — COLCHICINE 0.6 MG/1
1.2 TABLET ORAL ONCE
Status: COMPLETED | OUTPATIENT
Start: 2021-04-04 | End: 2021-04-04

## 2021-04-04 RX ADMIN — MAGNESIUM GLUCONATE 500 MG ORAL TABLET 400 MG: 500 TABLET ORAL at 07:48

## 2021-04-04 RX ADMIN — COLCHICINE 1.2 MG: 0.6 TABLET ORAL at 17:17

## 2021-04-04 RX ADMIN — ENOXAPARIN SODIUM 40 MG: 40 INJECTION SUBCUTANEOUS at 07:49

## 2021-04-04 RX ADMIN — Medication 10 ML: at 07:49

## 2021-04-04 RX ADMIN — Medication 10 ML: at 21:00

## 2021-04-04 RX ADMIN — OXYCODONE AND ACETAMINOPHEN 2 TABLET: 5; 325 TABLET ORAL at 07:48

## 2021-04-04 RX ADMIN — TIMOLOL MALEATE 1 DROP: 5 SOLUTION OPHTHALMIC at 07:49

## 2021-04-04 RX ADMIN — ASPIRIN 81 MG: 81 TABLET, COATED ORAL at 07:48

## 2021-04-04 RX ADMIN — TAMSULOSIN HYDROCHLORIDE 0.4 MG: 0.4 CAPSULE ORAL at 07:48

## 2021-04-04 RX ADMIN — AMLODIPINE BESYLATE 2.5 MG: 2.5 TABLET ORAL at 08:47

## 2021-04-04 RX ADMIN — OXYCODONE AND ACETAMINOPHEN 2 TABLET: 5; 325 TABLET ORAL at 12:57

## 2021-04-04 RX ADMIN — FAMOTIDINE 20 MG: 20 TABLET ORAL at 07:48

## 2021-04-04 RX ADMIN — ATORVASTATIN CALCIUM 10 MG: 10 TABLET, FILM COATED ORAL at 07:48

## 2021-04-04 RX ADMIN — COLCHICINE 0.6 MG: 0.6 TABLET ORAL at 17:53

## 2021-04-04 RX ADMIN — GLYCOPYRROLATE AND FORMOTEROL FUMARATE 2 PUFF: 9; 4.8 AEROSOL, METERED RESPIRATORY (INHALATION) at 20:08

## 2021-04-04 RX ADMIN — PREDNISONE 10 MG: 10 TABLET ORAL at 07:48

## 2021-04-04 RX ADMIN — GLYCOPYRROLATE AND FORMOTEROL FUMARATE 2 PUFF: 9; 4.8 AEROSOL, METERED RESPIRATORY (INHALATION) at 08:51

## 2021-04-04 RX ADMIN — TIMOLOL MALEATE 1 DROP: 5 SOLUTION OPHTHALMIC at 19:50

## 2021-04-04 ASSESSMENT — PAIN DESCRIPTION - PAIN TYPE
TYPE: ACUTE PAIN

## 2021-04-04 ASSESSMENT — PAIN SCALES - GENERAL
PAINLEVEL_OUTOF10: 8
PAINLEVEL_OUTOF10: 8
PAINLEVEL_OUTOF10: 7
PAINLEVEL_OUTOF10: 0

## 2021-04-04 ASSESSMENT — PAIN DESCRIPTION - LOCATION
LOCATION: KNEE
LOCATION: KNEE

## 2021-04-04 ASSESSMENT — PAIN DESCRIPTION - DESCRIPTORS
DESCRIPTORS: ACHING;DISCOMFORT
DESCRIPTORS: ACHING

## 2021-04-04 ASSESSMENT — PAIN DESCRIPTION - ORIENTATION
ORIENTATION: RIGHT;LEFT

## 2021-04-04 ASSESSMENT — PAIN - FUNCTIONAL ASSESSMENT: PAIN_FUNCTIONAL_ASSESSMENT: PREVENTS OR INTERFERES SOME ACTIVE ACTIVITIES AND ADLS

## 2021-04-04 ASSESSMENT — PAIN DESCRIPTION - FREQUENCY: FREQUENCY: INTERMITTENT

## 2021-04-04 ASSESSMENT — PAIN DESCRIPTION - ONSET: ONSET: ON-GOING

## 2021-04-04 NOTE — PROGRESS NOTES
Occupational Therapy  Facility/Department: [facility] C5 - MED SURG/ORTHO  Daily Treatment Note  NAME: Bethany Stoll  : 10/29/1925  MRN: [id number]    Date of Service: 2021    Discharge Recommendations:  Subacute/Skilled Nursing Facility     Assessment   Performance deficits / Impairments: Decreased high-level IADLs;Decreased functional mobility ; Decreased balance;Decreased vision/visual deficit; Decreased ADL status; Decreased strength;Decreased endurance  Assessment: Pt demos need for increased assist this session, per staff and pt report d/t increased pain in BLE knees. Pt requiring Ax2 to complete functional transfers and mobility trials with RW this session, was SBA previous OT treatment. Co-tx collaboration this date with PT staff to safely progress pt toward goals. Pt will have better occupational performance outcomes within a co-treatment than 1:1 session. Due to pt decline in status and need for 2 person assist as well as the above noted occupational performance deficits now recommend SNF at d/c as pt is functioning below his baseline. Prognosis: Good  OT Education: OT Role;Plan of Care;Precautions;Transfer Training;ADL Adaptive Strategies  Patient Education: disease specific: role of OT, transfer training  REQUIRES OT FOLLOW UP: Yes  Activity Tolerance  Activity Tolerance: Patient Tolerated treatment well;Patient limited by pain  Activity Tolerance: Vitals: KT=790/58, HR=79, SPO2=91% RA  Safety Devices  Safety Devices in place: Yes  Type of devices: Left in chair;Chair alarm in place;Call light within reach;Nurse notified;Gait belt         Patient Diagnosis(es): The primary encounter diagnosis was Syncope and collapse. Diagnoses of Hypokalemia, Sinus bradycardia, PVC's (premature ventricular contractions), and Swelling of knee joint, unspecified laterality were also pertinent to this visit.       has a past medical history of Cancer ([facility] [address] GERD (gastroesophageal reflux disease), Glaucoma, History of stomach ulcers, Hyperlipidemia, Hypertension, Nausea alone, Pneumonia, Psychiatric problem, S/P radiation therapy, Seasonal allergies, and Staphylococcal arthritis of left knee ([facility] [address] has a past surgical history that includes Appendectomy; Cholecystectomy; skin biopsy; other surgical history (7/27/2012); Colonoscopy (10/20/1999); Colonoscopy (01/16/2002); Upper gastrointestinal endoscopy (04/30/2004); Upper gastrointestinal endoscopy (12/12/2007); Prostate biopsy (2007); Upper gastrointestinal endoscopy (4/19/2013); Cataract removal with implant (Right, 12/01/2016); Cataract removal with implant (Left, 12/13/2016); other surgical history (Left, 05/09/2017); Wrist surgery (Right, 05/12/2017); knee surgery (Right, 05/12/2017); eye surgery; and Endoscopy, colon, diagnostic. Restrictions  Restrictions/Precautions  Restrictions/Precautions: General Precautions, Fall Risk  Required Braces or Orthoses?: No  Position Activity Restriction  Other position/activity restrictions: up with assistance     Subjective   General  Chart Reviewed: Yes  Patient assessed for rehabilitation services?: Yes  Response to previous treatment: Patient with no complaints from previous session  Family / Caregiver Present: No  Referring Practitioner: Lazarus Escalante MD  Diagnosis: syncope    Subjective  Subjective: Pt resting in bed, pleasant and agreeable to OT treatment. Pain Assessment  Pain Assessment: 0-10  Pain Level: 7  Pain Type: Acute pain  Pain Location: Knee  Pain Orientation: Right;Left  Non-Pharmaceutical Pain Intervention(s): Repositioned; Ambulation/Increased Activity  Pre Treatment Pain Screening  Intervention List: Patient able to continue with treatment  Vital Signs  Patient Currently in Pain: Yes     Orientation  Orientation  Overall Orientation Status: Within Functional Limits     Objective    ADL  Feeding: Independent  Grooming: Setup;Supervision(washing face, using mouthwash, and combing hair seated)  Additional Comments: Pt declined need for further ADLs. Balance  Sitting Balance: Stand by assistance  Standing Balance: Dependent/Total(mod-max A of 2 with RW)  Standing Balance  Activity: initial static stand from EOB up to RW, pt able to take a few steps forwards/backwards from EOB with mod-max A of 2, Eilleen Oar then used to transfer pt from EOB around foot of bed to chair  Comment: pt with difficulty fully extending knees, posterior lean with standing    Functional Mobility  Functional - Mobility Device: Rolling Walker  Activity: Other  Assist Level: Dependent/Total(mod-max A of 2)    Bed mobility  Supine to Sit: Moderate assistance(to R with HOB elevated and use of bedrail, increased time)     Transfers  Sit to stand: Dependent/Total;2 Person assistance(mod A of 2)  Stand to sit: Dependent/Total;2 Person assistance(mod A of 2)     Plan   Plan  Times per week: 3-5x/wk  Current Treatment Recommendations: Strengthening, Endurance Training, Balance Training, Functional Mobility Training, Safety Education & Training, Self-Care / ADL    AM-[facility] Score  -[facility] Inpatient Daily Activity Raw Score: 20 (04/04/21 1016)  AM-PAC Inpatient ADL T-Scale Score : 42.03 (04/04/21 1016)  ADL Inpatient CMS 0-100% Score: 38.32 (04/04/21 1016)  ADL Inpatient CMS G-Code Modifier : CJ (04/04/21 1016)    Goals  Short term goals  Time Frame for Short term goals:  One week (4/6/2021)  Short term goal 1: Pt will complete 15 reps of BUE exercises to increase strength to engage in ADLs (by 4/3/2021)--GOAL MET, pt performed 15reps BUE exercises 4/01/21  Short term goal 2: Pt will complete grooming activity at sink for 5 minutes with supervision--ongoing, 4/4/21  Short term goal 3: Pt will complete toilet transfer with mod I--ongoing 4/4/21  Patient Goals   Patient goals : \"to go home\"       Therapy Time   Individual Concurrent Group Co-treatment   Time In 0903         Time Out 0933         Minutes Aure 1, ESQUIVEL/L

## 2021-04-04 NOTE — PLAN OF CARE
Problem: Cardiac:  Goal: Ability to maintain vital signs within normal range will improve  Description: Ability to maintain vital signs within normal range will improve  Outcome: Ongoing     Problem: Cardiac:  Goal: Cardiovascular alteration will improve  Description: Cardiovascular alteration will improve  Outcome: Ongoing     Problem: Pain:  Goal: Pain level will decrease  Description: Pain level will decrease  Outcome: Ongoing     Problem: Falls - Risk of:  Goal: Will remain free from falls  Description: Will remain free from falls  Outcome: Ongoing

## 2021-04-04 NOTE — PROGRESS NOTES
Physical Therapy  Facility/Department: [facility] C5 - MED SURG/ORTHO  Daily Treatment Note  NAME: Salbador Favre Grooms  : 10/29/1925  MRN: [id number]    Date of Service: 2021    Discharge Recommendations:  Subacute/Skilled Nursing Facility   PT Equipment Recommendations  Equipment Needed: No  Other: defer to patient's facility. Assessment   Body structures, Functions, Activity limitations: Decreased functional mobility ; Decreased strength;Decreased safe awareness;Decreased endurance;Decreased balance; Increased pain;Decreased posture;Decreased cognition  Assessment: Pt requires increased assistance in this date secondary to increased R knee pain. Pt demonstrated bed mobility w mod A x1, t/f modA x2, and short gait (4 ft) with mod-max x2. Pt is limited by decreased strength, decreased activity tolerance, and increased pain. Pt demonstrated increased posterior lean with standing and gait activites. Pt will benefit from continued skilled PT in acute care setting to address above deficits. Treatment Diagnosis: impaired functional mobility  Specific instructions for Next Treatment: progress mobility as tolerated  Prognosis: Good  Decision Making: Low Complexity  PT Education: Goals; General Safety;Gait Training;PT Role;Plan of Care;Transfer Training;Disease Specific Education  Patient Education: Educated on safety with transfers with L knee pain and increased assist level with transfers. educated patient on importance of increased mobility and importance of completing his exercises - pt verbalized understanding  Barriers to Learning: short term memory  REQUIRES PT FOLLOW UP: Yes  Activity Tolerance  Activity Tolerance: Patient limited by pain; Patient limited by endurance     Patient Diagnosis(es): The primary encounter diagnosis was Syncope and collapse.  Diagnoses of Hypokalemia, Sinus bradycardia, PVC's (premature ventricular contractions), and Swelling of knee joint, unspecified laterality were also pertinent to this visit.     has a past medical history of Cancer ([facility] [address] GERD (gastroesophageal reflux disease), Glaucoma, History of stomach ulcers, Hyperlipidemia, Hypertension, Nausea alone, Pneumonia, Psychiatric problem, S/P radiation therapy, Seasonal allergies, and Staphylococcal arthritis of left knee ([facility] [address] has a past surgical history that includes Appendectomy; Cholecystectomy; skin biopsy; other surgical history (7/27/2012); Colonoscopy (10/20/1999); Colonoscopy (01/16/2002); Upper gastrointestinal endoscopy (04/30/2004); Upper gastrointestinal endoscopy (12/12/2007); Prostate biopsy (2007); Upper gastrointestinal endoscopy (4/19/2013); Cataract removal with implant (Right, 12/01/2016); Cataract removal with implant (Left, 12/13/2016); other surgical history (Left, 05/09/2017); Wrist surgery (Right, 05/12/2017); knee surgery (Right, 05/12/2017); eye surgery; and Endoscopy, colon, diagnostic. Restrictions  Restrictions/Precautions  Restrictions/Precautions: General Precautions, Fall Risk  Required Braces or Orthoses?: No  Position Activity Restriction  Other position/activity restrictions: up with assistance  Subjective   General  Chart Reviewed: Yes  Response To Previous Treatment: Patient with no complaints from previous session. Family / Caregiver Present: No  Referring Practitioner: Hugh Jimenez MD  Subjective  Subjective: Patient agreed to participate. General Comment  Comments: Cleared for therapy by patient's RN. Supine in bed upon arrival.  Pain Screening  Patient Currently in Pain: Yes  Pain Assessment  Pain Assessment: 0-10  Pain Level: 7  Pain Type: Acute pain  Pain Location: Knee  Pain Orientation: Right;Left(R > L)  Pain Descriptors: Aching  Non-Pharmaceutical Pain Intervention(s): Ambulation/Increased Activity; Distraction;Repositioned; Therapeutic presence  Vital Signs  Pulse: 79  Heart Rate Source: Monitor  BP: (!) 106/58  BP Location: Left upper arm  Patient Position: Semi fowlers  Patient Currently in Pain: Yes  Oxygen Therapy  SpO2: 91 %  Pulse Oximeter Device Mode: Intermittent  O2 Device: None (Room air)       Orientation  Orientation  Overall Orientation Status: Within Normal Limits    Objective   Bed mobility  Supine to Sit: Moderate assistance(HOB elevated, used BR)  Sit to Supine: Unable to assess(Pt seated in chair at end of session)     Transfers  Sit to Stand: Moderate Assistance;2 Person Assistance  Stand to sit: 2 Person Assistance; Moderate Assistance  Bed to Chair: Dependent/Total(Mod x2 with STEADY)  Comment: Pt completed sit <> stand EOB to RW, therapist cues for hand placement and anterior WS. Ambulation  Ambulation?: Yes  More Ambulation?: No  Ambulation 1  Surface: level tile  Device: Rolling Walker  Assistance: 2 Person assistance(ModA x1, maxA x1)  Quality of Gait: Step-to pattern, narrow RYAN, B decreased toe clearance w shuffling gait, decreased BTKE, increased hip flx in stance and fwd flexed trunk. Pt mod unsteady, required A x 2 to maintain balance. Gait Deviations: Slow Afia; Increased RYAN; Decreased step length;Decreased step height;Shuffles  Distance: 4 feet frw/backwards  Comments: Cued on increasing RYAN and improving hip/knee extension     Balance  Posture: Fair  Sitting - Static: Good  Sitting - Dynamic: Good  Standing - Static: Poor  Standing - Dynamic: Poor  Comments: Mod-max x 1-2 with RW, pt demonstrates increased posterior lean     Exercises  Hip Flexion: x10 BLE  Knee Long Arc Quad: x15 LLE, x10 RLE  Ankle Pumps: 1 x 15 bilateral  Comments: cueing for sequencing and technique.                         AM-PAC Score     AM-PAC Inpatient Mobility without Stair Climbing Raw Score : 8 (04/04/21 1013)  AM-PAC Inpatient without Stair Climbing T-Scale Score : 30.65 (04/04/21 1013)  Mobility Inpatient CMS 0-100% Score: 80.91 (04/04/21 1013)  Mobility Inpatient without Stair CMS G-Code Modifier : CM (04/04/21 1013)       Goals  Short term goals  Time Frame for Short term goals: 7 days (4/7/2021)  Short term goal 1: Pt will perform bed mobility independently  -4/04 - min A  Short term goal 2: Pt will perform transfers with LRAD and supervision   -4/04 mod GOAL NOT MET  Short term goal 3: Pt will ambulate 76' with LRAD and supervision   -4/04 4 feet with mod-max x2 assist and RW  Short term goal 4: Pt will perform 15-20 reps BLE exercises to improve BLE strength   -4/04 progressing  Patient Goals   Patient goals : \"to walk by myself\"    Plan    Plan  Times per week: 5-7/week  Times per day: Daily  Plan weeks: 1 week 4/7  Specific instructions for Next Treatment: progress mobility as tolerated  Current Treatment Recommendations: Strengthening, Balance Training, Functional Mobility Training, Transfer Training, Gait Training, Endurance Training, Neuromuscular Re-education, Safety Education & Training, Patient/Caregiver Education & Training, Home Exercise Program  Safety Devices  Type of devices:  All fall risk precautions in place, Call light within reach, Gait belt, Patient at risk for falls, Nurse notified, Left in chair, Chair alarm in place  Restraints  Initially in place: No     Therapy Time   Individual Concurrent Group Co-treatment   Time In 0902         Time Out 0930         Minutes 28         Timed Code Treatment Minutes: [address], [place]

## 2021-04-04 NOTE — PROGRESS NOTES
Hospitalist Progress Note      PCP: Vira Newell MD    Date of Admission: 3/30/2021    Chief Complaint:    Loss of Consciousness       per pt family member he passed out earlier unaware of how long he was out/seen here last week for heart issues\"         Hospital Course: 79 yo male who remains independent in his own home presented to the ED for evaluation of syncope. Pt is < 1 week post hospitalization for Trigeminy and dyspnea and was treated with Ceftin for possible bronchopneumonia     ED course EKG was obtained and notable for sinus bradycardia with first-degree AV block, RBBB, and left anterior fascicular block. CXR was notable for bibasilar hypoaeration but otherwise interpreted as normal.      Patient admitted for syncope and collapse with notable bradycardia with bifascicular block. Subjective: EMR and notes reviewed pt seen and examined. HR has remained stable overnight. Yesterday developed right knee pain and effusion.  See by Yanni sanchez 4/3, left knee pain has improved but now with left knee pain     Thank you for  Medications:  Reviewed    Infusion Medications    sodium chloride       Scheduled Medications    colchicine  0.6 mg Oral Daily    amLODIPine  2.5 mg Oral Daily    aspirin  81 mg Oral Daily    glycopyrrolate-formoterol  2 puff Inhalation BID    timolol  1 drop Both Eyes BID    tamsulosin  0.4 mg Oral Daily    atorvastatin  10 mg Oral Daily    predniSONE  10 mg Oral Daily    magnesium oxide  400 mg Oral Daily    sodium chloride flush  10 mL Intravenous 2 times per day    enoxaparin  40 mg Subcutaneous Daily    famotidine  20 mg Oral Daily     PRN Meds: oxyCODONE-acetaminophen **OR** oxyCODONE-acetaminophen, acetaminophen, sodium chloride, sennosides-docusate sodium, sodium chloride flush, sodium chloride, potassium chloride **OR** potassium alternative oral replacement **OR** potassium chloride, magnesium sulfate, promethazine **OR** ondansetron, senna, acetaminophen **OR** acetaminophen      Intake/Output Summary (Last 24 hours) at 4/4/2021 1337  Last data filed at 4/4/2021 0538  Gross per 24 hour   Intake 440 ml   Output 351 ml   Net 89 ml       Physical Exam Performed:    BP (!) 106/58   Pulse 79   Temp 99.2 °F (37.3 °C) (Oral)   Resp 16   Wt 160 lb 11.5 oz (72.9 kg)   SpO2 91%   BMI 24.44 kg/m²     General appearance: No apparent distress, appears stated age and cooperative. Impressive for his age   [de-identified]: Pupils equal, round, and reactive to light. Conjunctivae/corneas clear. Neck: Supple, with full range of motion. No jugular venous distention. Trachea midline. Respiratory:  Normal respiratory effort. Clear to auscultation, bilaterally without Rales/Wheezes/Rhonchi. Cardiovascular: Regular rate and rhythm with normal S1/S2 without murmurs, rubs or gallops. Abdomen: Soft, non-tender, non-distended with normal bowel sounds. Musculoskeletal: No clubbing, cyanosis or edema bilaterally. Left knee with edema and pain no erythema   Skin: Skin color, texture, turgor normal.  No rashes or lesions. Neurologic:  Neurovascularly intact without any focal sensory/motor deficits. Cranial nerves: II-XII intact, grossly non-focal.  Psychiatric: Alert and oriented, thought content appropriate, normal insight  Capillary Refill: Brisk,< 3 seconds   Peripheral Pulses: +2 palpable, equal bilaterally       Labs:   Recent Labs     04/04/21  1057   WBC 11.9*   HGB 13.0*   HCT 38.0*        No results for input(s): NA, K, CL, CO2, BUN, CREATININE, CALCIUM, PHOS in the last 72 hours. Invalid input(s): MAGNES  No results for input(s): AST, ALT, BILIDIR, BILITOT, ALKPHOS in the last 72 hours. No results for input(s): INR in the last 72 hours. No results for input(s): Dalia Grumet in the last 72 hours.     Urinalysis:      Lab Results   Component Value Date    NITRU Negative 03/30/2021    BLOODU Negative 03/30/2021    SPECGRAV 1.025 03/30/2021    GLUCOSEU Negative 03/30/2021       Radiology:  XR CHEST PORTABLE   Final Result   No acute process. Bibasilar hypoaeration                 Assessment/Plan:    Active Hospital Problems    Diagnosis    Syncope and collapse [R55]    Hyponatremia [E87.1]    Hypokalemia [E87.6]    PVC's (premature ventricular contractions) [I49.3]    HTN (hypertension) [I10]    Hiatal hernia [K44.9]    PUD (peptic ulcer disease) [K27.9]    Sinus bradycardia [R00.1]     Syncope and collapse   -Suspect may be secondary to recent addition of Lopressor 12.5 mg twice daily, which was started to address his trigeminy or perhaps 2/2  Resuming lasix and hctz as OP setting, these were held while IP w/o adverse effects.  PT was given IVF in the ED   -Beta-blocker held  -consult cardiology for further recommendations  -Fall and seizure precautions in place  -Orthostatic vital signs negative  -Strict I's and O's  -ECHO completed 3/24/2021   -PT/OT to evaluate and treat  - tele      Hyponatremia/hypokalemia  -Patient with normal sodium and potassium levels just 7 days prior to admission  -Review of epic medical record reveals that both his HCTZ and Lasix were HELD during his recent hospital stay with no adverse effects; review of medications for discharge reveals that BOTH diuretics were resumed after leaving  - IVF given   - replace and monitor     COPD  -Patient stable on room air  -Recommend incentive spirometry every 4 hours while awake  -Continue Anoro Ellipta inhaler per home dosage  -Patient on prednisone 10 mg daily which has been confirmed with his PCP and family members; continue at current dosage for now     History of PUD  -Continue ASA but not sure enteric-coated  -NSAIDs currently held as they may be contributing to his hyponatremia  -Initiate H2 blocker Pepcid once daily (rather than PPI as latter agent may worsen hyponatremia)    HTN- sub therapeutic- improved with Norvasc   - added 2.5 Norvasc monitor      Left knee edema and pain- possible irritation from aggressive ambulation with therapy, suspect gout flare   - + hx of gout   -- Ortho consulted and knee tapped, suspect gout as pt was taken off his colchicine that he took daily when admitted now having pain in right knee  - aspiration Vs ace wrap and medical mgt   -4/3 aspirated this am - sent for culture  - 4/4 give colchicine 1.2 now then 0.6 in 1 hour and then resume home dose        DVT Prophylaxis: lovenox   Diet: DIET CARDIAC; Carb Control: 4 carb choices (60 gms)/meal; No Added Salt (3-4 GM);  Daily Fluid Restriction: 2000 ml  Code Status: Full Code    PT/OT Eval Status: ordered rec's for HHT     Dispo - possible home with [facility] when able to ambulate and gout flare better controlled     Radha Kolb, APRN - CNP

## 2021-04-04 NOTE — PROGRESS NOTES
Department of Orthopedic Surgery  Physician Assistant   Progress Note    Subjective:       Systemic or Specific Complaints:  RIGHT knee pain today, left knee pain stable after aspiration and improved. Pt states he woke up with right knee pain this am.  He was unable to ambulate well yesterday. Objective:     Patient Vitals for the past 24 hrs:   BP Temp Temp src Pulse Resp SpO2 Weight   04/04/21 0905 (!) 106/58 -- -- 79 -- 91 % --   04/04/21 0743 (!) 143/73 99.2 °F (37.3 °C) Oral 93 16 93 % --   04/04/21 0413 127/66 98.1 °F (36.7 °C) Axillary 80 16 92 % 160 lb 11.5 oz (72.9 kg)   04/04/21 0412 -- -- -- 80 -- 93 % --   04/03/21 2359 138/73 97.9 °F (36.6 °C) Oral 72 16 95 % --   04/03/21 2358 138/73 97.9 °F (36.6 °C) -- 68 -- 94 % --   04/03/21 2102 -- -- -- -- -- 96 % --   04/03/21 2000 (!) 143/73 98.1 °F (36.7 °C) Oral 67 16 95 % --   04/03/21 1504 -- -- Oral -- -- -- --       General: alert, appears stated age, cooperative and no distress   Wound: Aspiration site benign   Motion: Painless Range of Motion in left knee, painful ROM in right knee. Able to perform SLR bilaterally. DVT Exam: No evidence of DVT seen on physical exam.     Additional exam: NVI to bilat LEs  Left knee effusion greatly reduced  Slight right knee effusion noted. Painful to palpation over joint lines on right. No erythema noted.      Data Review  CBC:   Lab Results   Component Value Date    WBC 6.9 04/01/2021    RBC 4.18 04/01/2021    HGB 13.6 04/01/2021    HCT 39.1 04/01/2021     04/01/2021       Renal:   Lab Results   Component Value Date     04/01/2021    K 4.5 04/01/2021    CL 99 04/01/2021    CO2 32 04/01/2021    BUN 13 04/01/2021    CREATININE 0.6 04/01/2021    GLUCOSE 106 04/01/2021    CALCIUM 9.2 04/01/2021        Uric Acid : 4.6  Left knee aspiration cell count:   Cell Count Fluid Type Knee   Left    Color, Fluid Yellow    Appearance, Fluid Cloudy    Clot Eval. see below    Comment: Small Clots Present - Results may be inaccurate due to   specimen clots. Nucl Cell, Fluid 36,770 /cumm    RBC, Fluid 25,000 /cumm    Neutrophil Count, Fluid 74 %    Macrophages 26 %    Number of Cells Counted Fluid 100       Left knee crystal:  Negative  Left knee culture:  Pending     Assessment:      bilateral knee pain, hx of gout and septic arthritis. Plan:      1:  Continue current plan of care per IM. Afebrile. Pt feels improved to left knee after aspiration, right knee pain noted today. Pt with hx of gout. Aspiration results do not appear consistent with septic joint, awaiting cultures. Would favor gout diagnosis at this time, pt on prednisone. Add colchicine. Pt evaluated with Dr. Fina Steven today. Discussed with Simon NP.  2:  Continue Deep venous thrombosis prophylaxis  3:  Continue Pain Control PRN  4:  PT and OT, WBAT.  5:  D/c planning for home initially, give poor progress with therapy he may need SNF. DCP to follow. No d/c over the weekend pending cultures.       Bernabe Au PA-C

## 2021-04-05 LAB
C-REACTIVE PROTEIN: 191.1 MG/L (ref 0–5.1)
CRYSTALS, FLUID: NORMAL
PATH CONSULT FLUID: NORMAL
PATH CONSULT FLUID: YES
SEDIMENTATION RATE, ERYTHROCYTE: 56 MM/HR (ref 0–20)
SOURCE BODY FLUID: NORMAL

## 2021-04-05 PROCEDURE — 86140 C-REACTIVE PROTEIN: CPT

## 2021-04-05 PROCEDURE — APPNB30 APP NON BILLABLE TIME 0-30 MINS: Performed by: PHYSICIAN ASSISTANT

## 2021-04-05 PROCEDURE — 6370000000 HC RX 637 (ALT 250 FOR IP): Performed by: HOSPITALIST

## 2021-04-05 PROCEDURE — 6370000000 HC RX 637 (ALT 250 FOR IP): Performed by: NURSE PRACTITIONER

## 2021-04-05 PROCEDURE — 97530 THERAPEUTIC ACTIVITIES: CPT

## 2021-04-05 PROCEDURE — 94640 AIRWAY INHALATION TREATMENT: CPT

## 2021-04-05 PROCEDURE — 1200000000 HC SEMI PRIVATE

## 2021-04-05 PROCEDURE — 6360000002 HC RX W HCPCS: Performed by: HOSPITALIST

## 2021-04-05 PROCEDURE — 97535 SELF CARE MNGMENT TRAINING: CPT

## 2021-04-05 PROCEDURE — 36415 COLL VENOUS BLD VENIPUNCTURE: CPT

## 2021-04-05 PROCEDURE — 97116 GAIT TRAINING THERAPY: CPT

## 2021-04-05 PROCEDURE — 85652 RBC SED RATE AUTOMATED: CPT

## 2021-04-05 PROCEDURE — 97110 THERAPEUTIC EXERCISES: CPT

## 2021-04-05 PROCEDURE — 2580000003 HC RX 258: Performed by: HOSPITALIST

## 2021-04-05 RX ADMIN — ENOXAPARIN SODIUM 40 MG: 40 INJECTION SUBCUTANEOUS at 08:37

## 2021-04-05 RX ADMIN — TAMSULOSIN HYDROCHLORIDE 0.4 MG: 0.4 CAPSULE ORAL at 08:36

## 2021-04-05 RX ADMIN — TIMOLOL MALEATE 1 DROP: 5 SOLUTION OPHTHALMIC at 08:37

## 2021-04-05 RX ADMIN — GLYCOPYRROLATE AND FORMOTEROL FUMARATE 2 PUFF: 9; 4.8 AEROSOL, METERED RESPIRATORY (INHALATION) at 20:27

## 2021-04-05 RX ADMIN — AMLODIPINE BESYLATE 2.5 MG: 2.5 TABLET ORAL at 08:36

## 2021-04-05 RX ADMIN — GLYCOPYRROLATE AND FORMOTEROL FUMARATE 2 PUFF: 9; 4.8 AEROSOL, METERED RESPIRATORY (INHALATION) at 08:04

## 2021-04-05 RX ADMIN — Medication 10 ML: at 08:37

## 2021-04-05 RX ADMIN — TIMOLOL MALEATE 1 DROP: 5 SOLUTION OPHTHALMIC at 20:20

## 2021-04-05 RX ADMIN — Medication 10 ML: at 20:20

## 2021-04-05 RX ADMIN — ASPIRIN 81 MG: 81 TABLET, COATED ORAL at 08:36

## 2021-04-05 RX ADMIN — ATORVASTATIN CALCIUM 10 MG: 10 TABLET, FILM COATED ORAL at 08:36

## 2021-04-05 RX ADMIN — MAGNESIUM GLUCONATE 500 MG ORAL TABLET 400 MG: 500 TABLET ORAL at 08:36

## 2021-04-05 RX ADMIN — PREDNISONE 10 MG: 10 TABLET ORAL at 08:36

## 2021-04-05 RX ADMIN — COLCHICINE 0.6 MG: 0.6 TABLET ORAL at 08:36

## 2021-04-05 RX ADMIN — FAMOTIDINE 20 MG: 20 TABLET ORAL at 08:36

## 2021-04-05 ASSESSMENT — PAIN SCALES - GENERAL
PAINLEVEL_OUTOF10: 4
PAINLEVEL_OUTOF10: 7

## 2021-04-05 ASSESSMENT — PAIN DESCRIPTION - ORIENTATION
ORIENTATION: LEFT;RIGHT
ORIENTATION: RIGHT;LEFT

## 2021-04-05 ASSESSMENT — PAIN DESCRIPTION - PAIN TYPE: TYPE: ACUTE PAIN

## 2021-04-05 NOTE — CARE COORDINATION
Case Management/Follow up:    Chart reviewed for length of stay. Hospital day # 6  Unit:  C5  Diagnosis and current status as per MD progress: Pt admitted with AD Kwok consulted for knee pain. Anticipated d/c date: TBD  Expected plan for discharge: TBD  Potential barriers: None  Confirmed plan with patient and/or family: Met with pt and daughter Cindy Franco on the phone to discuss SNF recs. Plan is to return home with [facility] with [facility] and lots of family support as well as non-skilled HC. If pt declines or doesn't improve daughter and pt would be OK with SNF and wants [facility]. OK for referral. Spoke to Claudia Degroot and they can accept pt for skilled stay with no cert needed. Pt qualifies F2S. Needs rapid covid done day of DC. Comments: Per Gama,\"bilateral knee pain, hx of gout and septic arthritis,Continue current plan of care per IM. Afebrile. Pt feels improved as a whole today and knee pain improved. Pt with hx of gout. Aspiration results do not appear consistent with septic joint, awaiting cultures. Would favor gout diagnosis at this time, pt on prednisone. Added colchicine yesterday and pt feeling better.   Will still await cultures prior to d/c\"  CM following-Apryl Gabriel RN

## 2021-04-05 NOTE — PROGRESS NOTES
Hospitalist Progress Note      PCP: Mino Rajan MD    Date of Admission: 3/30/2021    Chief Complaint:    Loss of Consciousness       per pt family member he passed out earlier unaware of how long he was out/seen here last week for heart issues\"       Hospital Course: 81 yo male who remains independent in his own home presented to the ED for evaluation of syncope. Pt is < 1 week post hospitalization for Trigeminy and dyspnea and was treated with Ceftin for possible bronchopneumonia. ED course EKG was obtained and notable for sinus bradycardia with first-degree AV block, RBBB, and left anterior fascicular block. CXR was notable for bibasilar hypoaeration but otherwise interpreted as normal. Patient admitted for syncope and collapse with notable bradycardia with bifascicular block. Subjective:   Pt is on RA. Afebrile. VSS. No complaints currently. Knees are feeling better today.      Medications:  Reviewed    Infusion Medications    sodium chloride       Scheduled Medications    colchicine  0.6 mg Oral Daily    amLODIPine  2.5 mg Oral Daily    aspirin  81 mg Oral Daily    glycopyrrolate-formoterol  2 puff Inhalation BID    timolol  1 drop Both Eyes BID    tamsulosin  0.4 mg Oral Daily    atorvastatin  10 mg Oral Daily    predniSONE  10 mg Oral Daily    magnesium oxide  400 mg Oral Daily    sodium chloride flush  10 mL Intravenous 2 times per day    enoxaparin  40 mg Subcutaneous Daily    famotidine  20 mg Oral Daily     PRN Meds: oxyCODONE-acetaminophen **OR** oxyCODONE-acetaminophen, acetaminophen, sodium chloride, sennosides-docusate sodium, sodium chloride flush, sodium chloride, potassium chloride **OR** potassium alternative oral replacement **OR** potassium chloride, magnesium sulfate, promethazine **OR** ondansetron, senna, acetaminophen **OR** acetaminophen      Intake/Output Summary (Last 24 hours) at 4/5/2021 1616  Last data filed at 4/5/2021 0539  Gross per 24 hour Intake 340 ml   Output 200 ml   Net 140 ml       Physical Exam Performed:    /76   Pulse 77   Temp 98.6 °F (37 °C) (Oral)   Resp 16   Wt 160 lb 11.5 oz (72.9 kg)   SpO2 95%   BMI 24.44 kg/m²     General appearance: No apparent distress, appears stated age and cooperative. Impressive for his age   [de-identified]: Pupils equal, round, and reactive to light. Conjunctivae/corneas clear. Neck: Supple, with full range of motion. No jugular venous distention. Trachea midline. Respiratory:  Normal respiratory effort. Clear to auscultation, bilaterally without Rales/Wheezes/Rhonchi. Cardiovascular: Regular rate and rhythm with normal S1/S2 without murmurs, rubs or gallops. Abdomen: Soft, non-tender, non-distended with normal bowel sounds. Musculoskeletal: No clubbing, cyanosis or edema bilaterally. Left knee with edema and pain no erythema -- improving   Skin: Skin color, texture, turgor normal.  No rashes or lesions. Neurologic:  Neurovascularly intact without any focal sensory/motor deficits. Cranial nerves: II-XII intact, grossly non-focal.  Psychiatric: Alert and oriented, thought content appropriate, normal insight  Capillary Refill: Brisk,< 3 seconds   Peripheral Pulses: +2 palpable, equal bilaterally       Labs:   Recent Labs     04/04/21  1057   WBC 11.9*   HGB 13.0*   HCT 38.0*        Urinalysis:      Lab Results   Component Value Date    NITRU Negative 03/30/2021    BLOODU Negative 03/30/2021    SPECGRAV 1.025 03/30/2021    GLUCOSEU Negative 03/30/2021       Radiology:  XR CHEST PORTABLE   Final Result   No acute process.       Bibasilar hypoaeration                 Assessment/Plan:    Active Hospital Problems    Diagnosis    Syncope and collapse [R55]    Hyponatremia [E87.1]    Hypokalemia [E87.6]    PVC's (premature ventricular contractions) [I49.3]    HTN (hypertension) [I10]    Hiatal hernia [K44.9]    PUD (peptic ulcer disease) [K27.9]    Sinus bradycardia [R00.1]       Syncope and collapse   -Orthostatic vital signs negative.  -Suspect may be secondary to recent addition of Lopressor 12.5 mg twice daily, which was started to address his trigeminy or perhaps 2/2  Resuming lasix and hctz as OP setting, these were held while IP w/o adverse effects. PT was given IVF in the ED   -Beta-blocker held, cardiology consulted. Continue to hold BB. Plan for 2 week cardiac event monitor to assess PVC burden.  -Continue to monitor pt on telemetry. -ECHO completed 3/24/2021.      Hyponatremia/hypokalemia (resolved)  -Patient with normal sodium and potassium levels just 7 days prior to admission  -Review of epic medical record reveals that both his HCTZ and Lasix were HELD during his recent hospital stay with no adverse effects; review of medications for discharge reveals that BOTH diuretics were resumed after leaving. -IVFs given. COPD  -Patient stable on room air  -Recommend incentive spirometry every 4 hours while awake  -Continue Anoro Ellipta inhaler per home dosage  -Patient on prednisone 10 mg daily which has been confirmed with his PCP and family members; continue at current dosage for now     History of PUD  -Continue ASA but not sure enteric-coated  -NSAIDs currently held as they may be contributing to his hyponatremia  -Initiate H2 blocker Pepcid once daily (rather than PPI as latter agent may worsen hyponatremia)    HTN  -Sub therapeutic- improved with Norvasc. Monitor. Left knee edema and pain- possible irritation from aggressive ambulation with therapy, suspect gout flare   -Ortho consulted and knee tapped, suspect gout as pt was taken off his colchicine that he took daily when admitted now having pain in right knee. -4/3 aspirated - sent for culture -- pending.   -4/4 given colchicine 1.2 now then 0.6 in 1 hour and then resume home dose.        DVT Prophylaxis: lovenox   Diet: DIET CARDIAC; Carb Control: 4 carb choices (60 gms)/meal; No Added Salt (3-4 GM);  Daily Fluid Restriction:

## 2021-04-05 NOTE — PROGRESS NOTES
Occupational Therapy  Facility/Department: [facility] C5 - MED SURG/ORTHO  Daily Treatment Note  NAME: Rosa Maria Stoll  : 10/29/1925  MRN: [id number]    Date of Service: 2021    Discharge Recommendations:  Subacute/Skilled Nursing Facility     Assessment   Performance deficits / Impairments: Decreased high-level IADLs;Decreased functional mobility ; Decreased balance;Decreased vision/visual deficit; Decreased ADL status; Decreased strength;Decreased endurance  Assessment: Pt progressing this session, limited d/t pain. Pt required mod Ax2 for functional mobility/transfers using RW. Pt dependent/total with mod Ax2 for standing balance to perform LE dressing. Pt completed grooming tasks while seated in chair with setup/supervision. Co-tx collaboration this date with PT staff to safely progress pt toward goals. Pt will have better occupational performance outcomes within a co-treatment than 1:1 session. Pt would benefit from SNF to continue to address the occupational performance deficits noted above. Prognosis: Good  OT Education: OT Role;Plan of Care;Precautions;Transfer Training;ADL Adaptive Strategies  Patient Education: disease specific: role of OT, transfer training  REQUIRES OT FOLLOW UP: Yes    Activity Tolerance  Activity Tolerance: Patient Tolerated treatment well;Patient limited by pain  Activity Tolerance: BX=650/68, HR=64 , O2=93%    Safety Devices  Safety Devices in place: Yes  Type of devices: Left in chair;Chair alarm in place;Call light within reach;Gait belt       Patient Diagnosis(es): The primary encounter diagnosis was Syncope and collapse. Diagnoses of Hypokalemia, Sinus bradycardia, PVC's (premature ventricular contractions), and Swelling of knee joint, unspecified laterality were also pertinent to this visit.       has a past medical history of Cancer ([facility] [address] GERD (gastroesophageal reflux disease), Glaucoma, History of stomach ulcers, Hyperlipidemia, Hypertension, Nausea alone, Pneumonia, Psychiatric problem, S/P radiation therapy, Seasonal allergies, and Staphylococcal arthritis of left knee ([facility] [address] has a past surgical history that includes Appendectomy; Cholecystectomy; skin biopsy; other surgical history (7/27/2012); Colonoscopy (10/20/1999); Colonoscopy (01/16/2002); Upper gastrointestinal endoscopy (04/30/2004); Upper gastrointestinal endoscopy (12/12/2007); Prostate biopsy (2007); Upper gastrointestinal endoscopy (4/19/2013); Cataract removal with implant (Right, 12/01/2016); Cataract removal with implant (Left, 12/13/2016); other surgical history (Left, 05/09/2017); Wrist surgery (Right, 05/12/2017); knee surgery (Right, 05/12/2017); eye surgery; and Endoscopy, colon, diagnostic. Restrictions  Restrictions/Precautions  Restrictions/Precautions: General Precautions, Fall Risk  Required Braces or Orthoses?: No  Position Activity Restriction  Other position/activity restrictions: up with assistance    Subjective   General  Chart Reviewed: Yes  Patient assessed for rehabilitation services?: Yes  Response to previous treatment: Patient with no complaints from previous session  Family / Caregiver Present: No  Referring Practitioner: Rafael Costello MD  Diagnosis: syncope    Subjective  Subjective: Pt resting in bed, pleasant and agreeable to OT treatment. General Comment  Comments: RN approved therapy    Pain Assessment  Pain Assessment: 0-10  Pain Level: 4  Pain Location: Knee  Pain Orientation: Right;Left  Non-Pharmaceutical Pain Intervention(s): Repositioned; Ambulation/Increased Activity  Pre Treatment Pain Screening  Intervention List: Patient able to continue with treatment  Patient Currently in Pain: Yes    Orientation  Orientation  Overall Orientation Status: Within Functional Limits    Objective    ADL  Grooming: Setup;Supervision(combing hair and washing face seated in chair)  LE Dressing: Dependent/total (mod Ax2 to adriel pull up)  Toileting: Dependent/total (mod Ax2)    Balance  Sitting Balance: Stand by assistance  Standing Balance: Dependent/Total(mod Ax2 using RW)  Standing Balance  Activity: pt able to take a few steps using RW from EOB forward/backwards with mod Ax2. Functional mobility to/from bathroom using RW. Functional Mobility  Functional - Mobility Device: Rolling Walker  Activity: To/from bathroom  Assist Level: Dependent/Total(mod Ax2)  Functional Mobility Comments: pt needed VC's to stay within the walker    Toilet Transfers  Toilet - Technique: Ambulating (use of RW)  Equipment Used: Standard toilet  Toilet Transfer: 2 Person assistance;Dependent/Total(mod Ax2, use of bilateral grab bars)    Bed mobility  Supine to Sit: Moderate assistance(HOB elevated, use of bed rails)    Transfers  Sit to stand: Dependent/Total;2 Person assistance(mod Ax2, use of RW)  Stand to sit: Dependent/Total;2 Person assistance(mod Ax2)  Transfer Comments: using RW; VC needed for safe hand placement    Cognition  Overall Cognitive Status: Exceptions  Arousal/Alertness: Delayed responses to stimuli  Following Commands: Follows one step commands with increased time; Follows one step commands with repetition  Attention Span: Attends with cues to redirect  Problem Solving: Assistance required to correct errors made;Decreased awareness of errors  Insights: Decreased awareness of deficits  Initiation: Requires cues for some     Type of ROM/Therapeutic Exercise  Type of ROM/Therapeutic Exercise: AROM  Comment: BUE, seated in chair  Exercises  Elbow Flexion: 15x  Elbow Extension: 15x  Finger Flexion: 15x  Finger Extension: 15x     Plan   Plan  Times per week: 3-5x/wk  Current Treatment Recommendations: Strengthening, Endurance Training, Balance Training, Functional Mobility Training, Safety Education & Training, Self-Care / ADL    AM-PAC Score   AM-[facility] Inpatient Daily Activity Raw Score: 20 (04/05/21 1103)  AM-PAC Inpatient ADL T-Scale Score : 42.03 (04/05/21 1103)  ADL Inpatient CMS 0-100% Score: 38.32 (04/05/21 1103)  ADL Inpatient CMS G-Code Modifier : CJ (04/05/21 1103)    Goals  Short term goals  Time Frame for Short term goals: One week (4/6/2021)  Short term goal 1: Pt will complete 15 reps of BUE exercises to increase strength to engage in ADLs (by 4/3/2021)--GOAL MET, pt performed 15reps BUE exercises 4/01/21  Short term goal 2: Pt will complete grooming activity at sink for 5 minutes with supervision--ongoing, 4/05/21  Short term goal 3: Pt will complete toilet transfer with mod I--ongoing 4/05/21  Patient Goals   Patient goals : \"to go home\"       Therapy Time   Individual Concurrent Group Co-treatment   Time In       1000   Time Out       [address], S/SHERIF    Agree with above. All information reviewed by Megan Centeno COTA/ALEENA

## 2021-04-05 NOTE — PROGRESS NOTES
Glaucoma, History of stomach ulcers, Hyperlipidemia, Hypertension, Nausea alone, Pneumonia, Psychiatric problem, S/P radiation therapy, Seasonal allergies, and Staphylococcal arthritis of left knee ([facility] [address] has a past surgical history that includes Appendectomy; Cholecystectomy; skin biopsy; other surgical history (7/27/2012); Colonoscopy (10/20/1999); Colonoscopy (01/16/2002); Upper gastrointestinal endoscopy (04/30/2004); Upper gastrointestinal endoscopy (12/12/2007); Prostate biopsy (2007); Upper gastrointestinal endoscopy (4/19/2013); Cataract removal with implant (Right, 12/01/2016); Cataract removal with implant (Left, 12/13/2016); other surgical history (Left, 05/09/2017); Wrist surgery (Right, 05/12/2017); knee surgery (Right, 05/12/2017); eye surgery; and Endoscopy, colon, diagnostic. Restrictions  Restrictions/Precautions  Restrictions/Precautions: General Precautions, Fall Risk  Required Braces or Orthoses?: No  Position Activity Restriction  Other position/activity restrictions: up with assistance     Subjective   General  Chart Reviewed: Yes  Response To Previous Treatment: Patient with no complaints from previous session. Family / Caregiver Present: No  Referring Practitioner: Catherine Larson MD  Subjective  Subjective: Patient agreed to participate. General Comment  Comments: Cleared for therapy by patient's RN. Supine in bed upon arrival.  Pain Screening  Patient Currently in Pain: Yes  Pain Assessment  Pain Assessment: 0-10  Pain Level: 7(during mobility)  Pain Type: Acute pain  Pain Location: Knee  Pain Orientation: Left;Right  Vital Signs  Patient Currently in Pain: Yes       Orientation  Orientation  Overall Orientation Status: Within Normal Limits     Objective   Bed mobility  Supine to Sit: Moderate assistance  Sit to Supine: Unable to assess  Transfers  Sit to Stand: Dependent/Total;2 Person Assistance; Moderate Assistance  Stand to sit: Dependent/Total;2 Person Assistance; Moderate Assistance  Ambulation  Ambulation?: Yes  Ambulation 1  Surface: level tile  Device: Rolling Walker  Assistance: Dependent/Total;2 Person assistance;Minimal assistance; Moderate assistance  Quality of Gait: Step-to pattern, narrow RYAN, B decreased toe clearance w shuffling gait, decreased BTKE, increased hip flx in stance and fwd flexed trunk. Pt mod unsteady, required A x 2 to maintain balance. Gait Deviations: Slow Afia; Increased RYAN; Decreased step length;Decreased step height;Shuffles  Distance: 22' + 15'  Comments: Cued on increasing RYAN and improving hip/knee extension        Exercises  Gluteal Sets: 15  Hip Flexion: seated marches X 15 BLE  Knee Long Arc Quad: X 15 BLE  Ankle Pumps: X 15 BLE seated  Comments: cueing for sequencing and technique.   Toileted with A for brief change, urinated on floor while sittingon commode (Total assist to clean area prior to stnading again)        AM-PAC Score     AM-PAC Inpatient Mobility without Stair Climbing Raw Score : 10 (04/05/21 1538)  AM-PAC Inpatient without Stair Climbing T-Scale Score : 34.07 (04/05/21 1538)  Mobility Inpatient CMS 0-100% Score: 71.66 (04/05/21 1538)  Mobility Inpatient without Stair CMS G-Code Modifier : CL (04/05/21 1538)       Goals  Short term goals  Time Frame for Short term goals: 7 days (4/7/2021)  Short term goal 1: Pt will perform bed mobility independently  -4/5 mod A  Short term goal 2: Pt will perform transfers with LRAD and supervision   -4/5 mod AOf 2 sit to stand  Short term goal 3: Pt will ambulate 76' with LRAD and supervision   -4/5 amb with RW 15' + 25' with mod A of 2  Short term goal 4: Pt will perform 15-20 reps BLE exercises to improve BLE strength   -4/5 met and continued  Patient Goals   Patient goals : \"to walk by myself\"    Plan    Plan  Times per week: 5-7/week  Times per day: Daily  Plan weeks: 1 week 4/7  Specific instructions for Next Treatment: progress mobility as tolerated  Current Treatment Recommendations:

## 2021-04-06 ENCOUNTER — TELEPHONE (OUTPATIENT)
Dept: CARDIOLOGY CLINIC | Age: 86
End: 2021-04-06

## 2021-04-06 VITALS
HEIGHT: 68 IN | SYSTOLIC BLOOD PRESSURE: 111 MMHG | TEMPERATURE: 97.6 F | BODY MASS INDEX: 17.64 KG/M2 | HEART RATE: 70 BPM | DIASTOLIC BLOOD PRESSURE: 68 MMHG | RESPIRATION RATE: 16 BRPM | WEIGHT: 116.4 LBS | OXYGEN SATURATION: 96 %

## 2021-04-06 LAB
HCT VFR BLD CALC: 35.9 % (ref 40.5–52.5)
HEMOGLOBIN: 12.2 G/DL (ref 13.5–17.5)
MCH RBC QN AUTO: 32.5 PG (ref 26–34)
MCHC RBC AUTO-ENTMCNC: 34.1 G/DL (ref 31–36)
MCV RBC AUTO: 95.2 FL (ref 80–100)
PDW BLD-RTO: 14 % (ref 12.4–15.4)
PLATELET # BLD: 265 K/UL (ref 135–450)
PMV BLD AUTO: 7.5 FL (ref 5–10.5)
RBC # BLD: 3.77 M/UL (ref 4.2–5.9)
WBC # BLD: 7.5 K/UL (ref 4–11)

## 2021-04-06 PROCEDURE — 97530 THERAPEUTIC ACTIVITIES: CPT

## 2021-04-06 PROCEDURE — 97116 GAIT TRAINING THERAPY: CPT

## 2021-04-06 PROCEDURE — 97110 THERAPEUTIC EXERCISES: CPT

## 2021-04-06 PROCEDURE — APPNB30 APP NON BILLABLE TIME 0-30 MINS: Performed by: PHYSICIAN ASSISTANT

## 2021-04-06 PROCEDURE — 6370000000 HC RX 637 (ALT 250 FOR IP): Performed by: HOSPITALIST

## 2021-04-06 PROCEDURE — 97535 SELF CARE MNGMENT TRAINING: CPT

## 2021-04-06 PROCEDURE — 85027 COMPLETE CBC AUTOMATED: CPT

## 2021-04-06 PROCEDURE — 94640 AIRWAY INHALATION TREATMENT: CPT

## 2021-04-06 PROCEDURE — 6360000002 HC RX W HCPCS: Performed by: HOSPITALIST

## 2021-04-06 PROCEDURE — 36415 COLL VENOUS BLD VENIPUNCTURE: CPT

## 2021-04-06 PROCEDURE — 6370000000 HC RX 637 (ALT 250 FOR IP): Performed by: NURSE PRACTITIONER

## 2021-04-06 RX ORDER — FAMOTIDINE 20 MG/1
20 TABLET, FILM COATED ORAL DAILY
Qty: 60 TABLET | Refills: 3 | Status: SHIPPED | OUTPATIENT
Start: 2021-04-07

## 2021-04-06 RX ADMIN — ASPIRIN 81 MG: 81 TABLET, COATED ORAL at 09:38

## 2021-04-06 RX ADMIN — TAMSULOSIN HYDROCHLORIDE 0.4 MG: 0.4 CAPSULE ORAL at 09:38

## 2021-04-06 RX ADMIN — PREDNISONE 10 MG: 10 TABLET ORAL at 09:39

## 2021-04-06 RX ADMIN — GLYCOPYRROLATE AND FORMOTEROL FUMARATE 2 PUFF: 9; 4.8 AEROSOL, METERED RESPIRATORY (INHALATION) at 12:43

## 2021-04-06 RX ADMIN — DOCUSATE SODIUM 50 MG AND SENNOSIDES 8.6 MG 2 TABLET: 8.6; 5 TABLET, FILM COATED ORAL at 09:39

## 2021-04-06 RX ADMIN — MAGNESIUM GLUCONATE 500 MG ORAL TABLET 400 MG: 500 TABLET ORAL at 09:39

## 2021-04-06 RX ADMIN — FAMOTIDINE 20 MG: 20 TABLET ORAL at 09:39

## 2021-04-06 RX ADMIN — TIMOLOL MALEATE 1 DROP: 5 SOLUTION OPHTHALMIC at 09:39

## 2021-04-06 RX ADMIN — ATORVASTATIN CALCIUM 10 MG: 10 TABLET, FILM COATED ORAL at 09:38

## 2021-04-06 RX ADMIN — COLCHICINE 0.6 MG: 0.6 TABLET ORAL at 09:38

## 2021-04-06 RX ADMIN — AMLODIPINE BESYLATE 2.5 MG: 2.5 TABLET ORAL at 09:39

## 2021-04-06 RX ADMIN — OXYCODONE AND ACETAMINOPHEN 2 TABLET: 5; 325 TABLET ORAL at 02:15

## 2021-04-06 RX ADMIN — ENOXAPARIN SODIUM 40 MG: 40 INJECTION SUBCUTANEOUS at 09:39

## 2021-04-06 ASSESSMENT — PAIN DESCRIPTION - PAIN TYPE: TYPE: ACUTE PAIN

## 2021-04-06 ASSESSMENT — PAIN DESCRIPTION - LOCATION
LOCATION: KNEE;FOOT
LOCATION: KNEE

## 2021-04-06 ASSESSMENT — PAIN SCALES - GENERAL: PAINLEVEL_OUTOF10: 7

## 2021-04-06 ASSESSMENT — PAIN DESCRIPTION - ORIENTATION: ORIENTATION: RIGHT;LEFT

## 2021-04-06 NOTE — PLAN OF CARE
Problem: Pain:  Goal: Pain level will decrease  Description: Pain level will decrease  4/6/2021 0233 by Anita Frye RN  Outcome: Ongoing  4/5/2021 1900 by Santana Carbone RN  Outcome: Ongoing     Problem: Pain:  Goal: Control of acute pain  Description: Control of acute pain  Outcome: Ongoing     Problem: Pain:  Goal: Control of chronic pain  Description: Control of chronic pain  Outcome: Ongoing     Pt scoring pain on 0-10 scale. Pain medications given per MAR. Pt instructed to call out when pain level increasing. Call light within reach. Nurse will continue to reassess and monitor.

## 2021-04-06 NOTE — PROGRESS NOTES
/cumm    Neutrophil Count, Fluid 74 %    Macrophages 26 %    Number of Cells Counted Fluid 100       Left knee crystal:  Negative  Left knee culture:  Negative     Sed rate:  56     Assessment:      bilateral knee pain c/o with gout flare, hx of gout and septic arthritis. Plan:      1:  Continue current plan of care per IM. Afebrile. Pt feels improved as a whole today and knee pain improved. Pt with hx of gout. Aspiration results do not appear consistent with septic joint, cultures negative. Would favor gout diagnosis at this time, pt on prednisone. Added colchicine and pt feeling better daily. 2:  Continue Deep venous thrombosis prophylaxis  3:  Continue Pain Control PRN  4:  PT and OT, WBAT.  5:  D/c planning for home vs SNF. No intervention required by Ortho. DCP updated today.  Follow up as needed    Pawan Diaz PA-C

## 2021-04-06 NOTE — DISCHARGE SUMMARY
Hospital Medicine Discharge Summary    Patient ID: Ayala García      Patient's PCP: Silvio Yeung MD    Admit Date: 3/30/2021     Discharge Date:   4/6/2021    Admitting Physician: Haim Ruiz MD     Discharge Physician: Sally Lowe, APRN - CNP     Discharge Diagnoses: Active Hospital Problems    Diagnosis    Syncope and collapse [R55]    Hyponatremia [E87.1]    Hypokalemia [E87.6]    PVC's (premature ventricular contractions) [I49.3]    HTN (hypertension) [I10]    Hiatal hernia [K44.9]    PUD (peptic ulcer disease) [K27.9]    Sinus bradycardia [R00.1]       The patient was seen and examined on day of discharge and this discharge summary is in conjunction with any daily progress note from day of discharge. Hospital Course:   81 yo male who remains independent in his own home presented to the ED for evaluation of syncope. Pt is < 1 week post hospitalization for Trigeminy and dyspnea and was treated with Ceftin for possible bronchopneumonia. ED course EKG was obtained and notable for sinus bradycardia with first-degree AV block, RBBB, and left anterior fascicular block. CXR was notable for bibasilar hypoaeration but otherwise interpreted as normal. Patient admitted for syncope and collapse with notable bradycardia with bifascicular block. Syncope and collapse   -Orthostatic vital signs negative.  -Suspect may be secondary to recent addition of Lopressor 12.5 mg twice daily, which was started to address his trigeminy or perhaps 2/2 resuming lasix and hctz as OP setting, these were held while IP w/o adverse effects. Pt was given IVF in the ED. -Beta-blocker held, cardiology consulted. Continue to hold BB. Plan for 2 week cardiac event monitor to assess PVC burden.   -ECHO completed 3/24/2021.      Hyponatremia/hypokalemia (resolved)  -Patient with normal sodium and potassium levels just 7 days prior to admission  -Review of epic medical record reveals that both his HCTZ and Lasix were HELD during his recent hospital stay with no adverse effects; review of medications for discharge reveals that BOTH diuretics were resumed after leaving. -IVFs given.      COPD  -Patient stable on room air  -Recommend incentive spirometry every 4 hours while awake  -Continue Anoro Ellipta inhaler per home dosage  -Patient on prednisone 10 mg daily which has been confirmed with his PCP and family members; continue at current dosage for now     History of PUD  -Continue ASA but not sure enteric-coated  -NSAIDs held as they may be contributing to his hyponatremia  -Initiate H2 blocker Pepcid once daily (rather than PPI as latter agent may worsen hyponatremia)     HTN  -Sub therapeutic- improved with the addition of Norvasc.     Left knee edema and pain - possible irritation from aggressive ambulation with therapy, suspect gout flare (improved)  -Ortho consulted and knee tapped, suspect gout as pt was taken off his colchicine that he took daily when admitted now having pain in right knee. -4/3 aspirated - sent for culture -- negative.   -4/4 given colchicine 1.2 now then 0.6 in 1 hour and then resume home dose. Physical Exam Performed:     /68   Pulse 70   Temp 97.6 °F (36.4 °C) (Oral)   Resp 16   Ht 5' 8\" (1.727 m)   Wt 116 lb 6.5 oz (52.8 kg)   SpO2 96%   BMI 17.70 kg/m²       General appearance:  No apparent distress, appears stated age and cooperative. HEENT:  Normal cephalic, atraumatic without obvious deformity. Pupils equal, round, and reactive to light. Extra ocular muscles intact. Conjunctivae/corneas clear. Neck: Supple, with full range of motion. No jugular venous distention. Trachea midline. Respiratory:  Normal respiratory effort. Clear to auscultation, bilaterally without Rales/Wheezes/Rhonchi. Cardiovascular:  Regular rate and rhythm with normal S1/S2 without murmurs, rubs or gallops.   Abdomen: Soft, non-tender, non-distended with normal bowel sounds. Musculoskeletal:  No clubbing, cyanosis or edema bilaterally. Full range of motion without deformity. Skin: Skin color, texture, turgor normal.  No rashes or lesions. Neurologic:  Neurovascularly intact without any focal sensory/motor deficits. Cranial nerves: II-XII intact, grossly non-focal.  Psychiatric:  Alert and oriented, thought content appropriate, normal insight  Capillary Refill: Brisk,< 3 seconds   Peripheral Pulses: +2 palpable, equal bilaterally       Labs: For convenience and continuity at follow-up the following most recent labs are provided:      CBC:    Lab Results   Component Value Date    WBC 7.5 04/06/2021    HGB 12.2 04/06/2021    HCT 35.9 04/06/2021     04/06/2021       Renal:    Lab Results   Component Value Date     04/01/2021    K 4.5 04/01/2021    CL 99 04/01/2021    CO2 32 04/01/2021    BUN 13 04/01/2021    CREATININE 0.6 04/01/2021    CALCIUM 9.2 04/01/2021    PHOS 2.7 05/23/2017         Significant Diagnostic Studies    Radiology:   XR CHEST PORTABLE   Final Result   No acute process.       Bibasilar hypoaeration                Consults:     IP CONSULT TO CARDIOLOGY  IP CONSULT TO ORTHOPEDIC SURGERY  IP CONSULT TO HOME CARE NEEDS    Disposition:  Home [place] [id number]      Condition at Discharge: Stable    Discharge Instructions/Follow-up:  Follow-up with PCP and Cardiology     Code Status:  Full Code     Activity: activity as tolerated    Diet: regular diet      Discharge Medications:     Current Discharge Medication List           Details   famotidine (PEPCID) 20 MG tablet Take 1 tablet by mouth daily  Qty: 60 tablet, Refills: 3      amLODIPine (NORVASC) 2.5 MG tablet Take 1 tablet by mouth daily  Qty: 30 tablet, Refills: 3              Details   magnesium oxide (MAG-OX) 400 (241.3 Mg) MG TABS tablet Take 1 tablet by mouth daily  Qty: 30 tablet, Refills: 0      colchicine (COLCRYS) 0.6 MG tablet       predniSONE (DELTASONE) 10 MG tablet       albuterol sulfate HFA (PROAIR HFA) 108 (90 Base) MCG/ACT inhaler Inhale 2 puffs into the lungs every 4 hours as needed      acetaminophen (TYLENOL) 325 MG tablet Take 650 mg by mouth every 6 hours as needed for Pain      ketoconazole (NIZORAL) 2 % cream Apply topically daily. Qty: 30 g, Refills: 1      sennosides-docusate sodium (SENOKOT-S) 8.6-50 MG tablet Take 2 tablets by mouth daily as needed for Constipation      aspirin 81 MG EC tablet Take 81 mg by mouth daily. simvastatin (ZOCOR) 10 MG tablet Take 10 mg by mouth nightly. timolol (TIMOPTIC) 0.5 % ophthalmic solution Place 1 drop into both eyes 2 times daily       umeclidinium-vilanterol (ANORO ELLIPTA) 62.5-25 MCG/INH AEPB inhaler Inhale 1 puff into the lungs daily      sodium chloride (OCEAN, BABY AYR) 0.65 % nasal spray 2 sprays by Nasal route as needed for Congestion (every  2 hours prn)      promethazine (PHENERGAN) 25 MG tablet Take 0.5-1 tablets by mouth every 6 hours as needed for Nausea. Qty: 30 tablet, Refills: 0      tamsulosin (FLOMAX) 0.4 MG capsule Take 0.4 mg by mouth daily. Time Spent on discharge is more than 45 minutes in the examination, evaluation, counseling and review of medications and discharge plan. Signed:    KAYLEEN Carrero - CNP   4/6/2021      Thank you Enoc Clinton MD for the opportunity to be involved in this patient's care. If you have any questions or concerns please feel free to contact me at [phone].

## 2021-04-06 NOTE — CARE COORDINATION
CASE MANAGEMENT DISCHARGE SUMMARY      Discharge to: Home with family 24/7 and [address]. New Durable Medical Equipment ordered/agency: None    Transportation: Family    Confirmed discharge plan with: Spoke to daughter Dirk Penn on the phone on this day. Patient: yes     Facility/Agency, name:  [facility] faxed- Spoke to BOD[facility] with [facility].       RN, name: Charanjit Hurtado RN

## 2021-04-06 NOTE — PROGRESS NOTES
ulcers, Hyperlipidemia, Hypertension, Nausea alone, Pneumonia, Psychiatric problem, S/P radiation therapy, Seasonal allergies, and Staphylococcal arthritis of left knee ([facility] [address] has a past surgical history that includes Appendectomy; Cholecystectomy; skin biopsy; other surgical history (7/27/2012); Colonoscopy (10/20/1999); Colonoscopy (01/16/2002); Upper gastrointestinal endoscopy (04/30/2004); Upper gastrointestinal endoscopy (12/12/2007); Prostate biopsy (2007); Upper gastrointestinal endoscopy (4/19/2013); Cataract removal with implant (Right, 12/01/2016); Cataract removal with implant (Left, 12/13/2016); other surgical history (Left, 05/09/2017); Wrist surgery (Right, 05/12/2017); knee surgery (Right, 05/12/2017); eye surgery; and Endoscopy, colon, diagnostic. Restrictions  Restrictions/Precautions  Restrictions/Precautions: General Precautions, Fall Risk  Required Braces or Orthoses?: No  Position Activity Restriction  Other position/activity restrictions: up with assistance     Subjective   General  Chart Reviewed: Yes  Response To Previous Treatment: Patient with no complaints from previous session. Family / Caregiver Present: No  Referring Practitioner: Shreyas Espinal MD  Subjective  Subjective: Pt agreeable, knees feeling better today  Pain Screening  Patient Currently in Pain: Yes(B knees not rated, \"feeling better\")  Pain Assessment  Pain Assessment: Faces  Roche-Baker Pain Rating: Hurts a little bit  Vital Signs  Patient Currently in Pain: Yes(B knees not rated, \"feeling better\")       Orientation  Orientation  Overall Orientation Status: Within Normal Limits     Objective   Bed mobility  Supine to Sit: Stand by assistance(HOB elevated ~30*, use of bed rails to pts R)  Transfers  Sit to Stand:  Moderate Assistance  Stand to sit: Moderate Assistance  Ambulation  Ambulation?: Yes  Ambulation 1  Surface: level tile  Device: Rolling Walker  Assistance: Minimal assistance  Quality of Gait: Step-to Gait belt, Left in chair, Nurse notified  Restraints  Initially in place: No     Therapy Time   Individual Concurrent Group Co-treatment   Time In 0848         Time Out 0927         Minutes [facility],[id number]

## 2021-04-06 NOTE — PROGRESS NOTES
Occupational Therapy  Facility/Department: [facility] C5 - MED SURG/ORTHO  Daily Treatment Note  NAME: Cayetano Stoll  : 10/29/1925  MRN: [id number]    Date of Service: 2021    Discharge Recommendations:  Subacute/Skilled Nursing Facility     Assessment   Assessment: Pt progressing well with OT treatment this session. Pt min Ax1 for functional mobility using RW. Pt required mod Ax1 for sit to stand transfers. Pt tolerated 20reps of BUE to help increase strength and endurance. Pt min Ax1 for toileting to maintain balance while performing clothing management. Co-tx collaboration this date with PT staff to safely progress pt toward goals. Pt will have better occupational performance outcomes within a co-treatment than 1:1 session. Pt would benefit from SNF to continue to address the above noted occupational performance deficits. Prognosis: Good    OT Education: OT Role;Plan of Care;Precautions;Transfer Training;ADL Adaptive Strategies; Home Exercise Program  Patient Education: disease specific: role of OT, transfer training, therex  REQUIRES OT FOLLOW UP: Yes    Activity Tolerance  Activity Tolerance: Patient Tolerated treatment well  Activity Tolerance: CE=928/78, HR=62, O2=92%  Safety Devices  Safety Devices in place: Yes  Type of devices: Left in chair;Chair alarm in place;Call light within reach;Gait belt       Patient Diagnosis(es): The primary encounter diagnosis was Syncope and collapse. Diagnoses of Hypokalemia, Sinus bradycardia, PVC's (premature ventricular contractions), and Swelling of knee joint, unspecified laterality were also pertinent to this visit. has a past medical history of Cancer ([address] GERD (gastroesophageal reflux disease), Glaucoma, History of stomach ulcers, Hyperlipidemia, Hypertension, Nausea alone, Pneumonia, Psychiatric problem, S/P radiation therapy, Seasonal allergies, and Staphylococcal arthritis of left knee ([address]    has a past surgical history that includes Appendectomy; Cholecystectomy; skin biopsy; other surgical history (7/27/2012); Colonoscopy (10/20/1999); Colonoscopy (01/16/2002); Upper gastrointestinal endoscopy (04/30/2004); Upper gastrointestinal endoscopy (12/12/2007); Prostate biopsy (2007); Upper gastrointestinal endoscopy (4/19/2013); Cataract removal with implant (Right, 12/01/2016); Cataract removal with implant (Left, 12/13/2016); other surgical history (Left, 05/09/2017); Wrist surgery (Right, 05/12/2017); knee surgery (Right, 05/12/2017); eye surgery; and Endoscopy, colon, diagnostic. Restrictions  Restrictions/Precautions  Restrictions/Precautions: General Precautions, Fall Risk  Required Braces or Orthoses?: No  Position Activity Restriction  Other position/activity restrictions: up with assistance    Subjective   General  Chart Reviewed: Yes  Patient assessed for rehabilitation services?: Yes  Response to previous treatment: Patient with no complaints from previous session  Family / Caregiver Present: No  Referring Practitioner: Shanika Krishna MD  Diagnosis: syncope    Subjective  Subjective: Pt resting in bed, pleasant and agreeable to OT treatment. Pain Assessment  Pain Assessment: Faces  Roche-Baker Pain Rating: Hurts a little bit  Pain Location: Knee  Pain Orientation: Right;Left  Non-Pharmaceutical Pain Intervention(s): Repositioned; Ambulation/Increased Activity  Pre Treatment Pain Screening  Intervention List: Patient able to continue with treatment  Patient Currently in Pain: Yes     Orientation  Orientation  Overall Orientation Status: Within Functional Limits    Objective    ADL  LE Dressing: Contact guard assistance(adriel pull up)  Toileting: Dependent/Total(mod Ax2 for balance)    Balance  Sitting Balance: Stand by assistance  Standing Balance: Moderate assistance  Standing Balance  Activity: functional mobility to/from bathroom and in room using RW; standing from toilet to RW for clothing management.     Functional Mobility  Functional - Mobility Device: Rolling Walker  Activity: To/from bathroom  Assist Level: Minimal assistance    Bed mobility  Supine to Sit: Stand by assistance(HOB elevated, use of bed rails)    Transfers  Sit to stand: Moderate assistance(using RW)  Stand to sit: Moderate assistance     Cognition  Overall Cognitive Status: Exceptions  Arousal/Alertness: Delayed responses to stimuli  Following Commands: Follows one step commands with increased time; Follows one step commands with repetition  Attention Span: Attends with cues to redirect  Safety Judgement: Decreased awareness of need for assistance  Problem Solving: Assistance required to correct errors made;Decreased awareness of errors  Insights: Decreased awareness of deficits  Initiation: Requires cues for some     Type of ROM/Therapeutic Exercise  Type of ROM/Therapeutic Exercise: AROM  Comment: BUE, seated in chair  Exercises  Shoulder Flexion: 20x  Horizontal ABduction: 20x  Horizontal ADduction: 20x  Elbow Flexion: 20x  Elbow Extension: 20x  Wrist Flexion: 20x  Wrist Extension: 20x  Finger Flexion: 20x  Finger Extension: 20x     Plan   Plan  Times per week: 3-5x/wk  Current Treatment Recommendations: Strengthening, Endurance Training, Balance Training, Functional Mobility Training, Safety Education & Training, Self-Care / ADL    -[facility] Score  [facility] Inpatient Daily Activity Raw Score: 20 (04/06/21 1051)  [facility] Inpatient ADL T-Scale Score : 42.03 (04/06/21 1051)  ADL Inpatient CMS 0-100% Score: 38.32 (04/06/21 1051)  ADL Inpatient CMS G-Code Modifier : Damonverenice Mcdonald (04/06/21 1051)    Goals  Short term goals  Time Frame for Short term goals:  One week (4/6/2021)  Short term goal 1: Pt will complete 15 reps of BUE exercises to increase strength to engage in ADLs (by 4/3/2021)--GOAL MET, pt performed 15reps BUE exercises 4/01/21  Short term goal 2: Pt will complete grooming activity at sink for 5 minutes with supervision--GOAL NOT MET, NT4/06/21  Short term goal 3: Pt will complete toilet transfer with mod I--GOAL NOT MET, pt mod A for transfers 4/06/21  Patient Goals   Patient goals : \"to go home\"       Therapy Time   Individual Concurrent Group Co-treatment   Time In       0849   Time Out       0927   Minutes       [facility], S/SHERIF    Agree with above. All information reviewed by Luiza Centeno COTA/ALEENA

## 2021-04-26 LAB
BODY FLUID CULTURE, STERILE: NORMAL
GRAM STAIN RESULT: NORMAL

## 2021-04-26 PROCEDURE — 93272 ECG/REVIEW INTERPRET ONLY: CPT | Performed by: INTERNAL MEDICINE

## 2021-04-28 DIAGNOSIS — R00.1 SINUS BRADYCARDIA: ICD-10-CM

## 2021-04-28 DIAGNOSIS — I49.3 PVC'S (PREMATURE VENTRICULAR CONTRACTIONS): ICD-10-CM

## 2021-04-29 ENCOUNTER — TELEPHONE (OUTPATIENT)
Dept: CARDIOLOGY CLINIC | Age: 86
End: 2021-04-29

## 2021-04-29 NOTE — TELEPHONE ENCOUNTER
Please let him know that cardiac monitor did not show significant arrhythmia. Follow up as planned, thanks!

## 2021-08-19 ENCOUNTER — HOSPITAL ENCOUNTER (EMERGENCY)
Age: 86
Discharge: HOME | End: 2021-08-20
Attending: EMERGENCY MEDICINE
Payer: MEDICARE

## 2021-08-19 DIAGNOSIS — L03.116 CELLULITIS OF LEFT LEG: Primary | ICD-10-CM

## 2021-08-19 PROCEDURE — 99283 EMERGENCY DEPT VISIT LOW MDM: CPT

## 2021-08-19 RX ORDER — CEPHALEXIN 500 MG/1
500 CAPSULE ORAL 2 TIMES DAILY
Qty: 14 CAPSULE | Refills: 0 | Status: SHIPPED | OUTPATIENT
Start: 2021-08-19 | End: 2021-08-26

## 2021-08-19 RX ORDER — CEPHALEXIN 250 MG/1
500 CAPSULE ORAL ONCE
Status: COMPLETED | OUTPATIENT
Start: 2021-08-19 | End: 2021-08-20

## 2021-08-19 ASSESSMENT — PAIN DESCRIPTION - PAIN TYPE: TYPE: ACUTE PAIN

## 2021-08-19 ASSESSMENT — PAIN SCALES - GENERAL: PAINLEVEL_OUTOF10: 3

## 2021-08-19 ASSESSMENT — PAIN DESCRIPTION - ORIENTATION: ORIENTATION: LEFT;LOWER

## 2021-08-19 ASSESSMENT — PAIN DESCRIPTION - LOCATION: LOCATION: LEG

## 2021-08-20 VITALS
DIASTOLIC BLOOD PRESSURE: 63 MMHG | BODY MASS INDEX: 18.2 KG/M2 | WEIGHT: 130 LBS | OXYGEN SATURATION: 96 % | RESPIRATION RATE: 16 BRPM | HEIGHT: 71 IN | SYSTOLIC BLOOD PRESSURE: 153 MMHG | HEART RATE: 64 BPM | TEMPERATURE: 97.6 F

## 2021-08-20 PROCEDURE — 6370000000 HC RX 637 (ALT 250 FOR IP): Performed by: EMERGENCY MEDICINE

## 2021-08-20 RX ADMIN — CEPHALEXIN 500 MG: 250 CAPSULE ORAL at 00:17

## 2021-08-20 NOTE — ED PROVIDER NOTES
Emergency Department Provider Note  Location: [address] [place] [address]  ED  8/19/2021     Patient Identification  Andreas Stoll is a 80 y.o. male    Chief Complaint  Rash (and swelling to left lower extremity. Started yesterday)          HPI  (History provided by patient and family member patient and daughter)  Patient is a 80-year-old female who presents with family member for evaluation for redness of her left lower extremity over the past 2 days. Patient reports that she has had a blister from wearing shoes on her arch of her left foot. She noticed yesterday some mild redness around the area and today has migrated has fairly well demarcated area of erythema on the distal medial aspect of her left calf. Denies any fevers chills nausea or vomiting. No ankle or knee pain. Able to bear weight and range joints. No other calf pain or edema swelling noted. No exacerbating relieving factors. I have reviewed the following nursing documentation:  Allergies: Allergies   Allergen Reactions    Bacitracin     Benzalkonium     Gramicidin     Hydrocortisone     Neomycin     Neomycin-Polymyxin-Gramicidin     Polymyxin B     Neosporin [Bacitracin-Neomycin-Polymyxin] Rash       Past medical history:  has a past medical history of Cancer ([facility] [address] GERD (gastroesophageal reflux disease), Glaucoma, History of stomach ulcers (1993+/-), Hyperlipidemia, Hypertension, Nausea alone, Pneumonia, Psychiatric problem, S/P radiation therapy (2007), Seasonal allergies, and Staphylococcal arthritis of left knee ([facility] [address] (5/9/2017). Past surgical history:  has a past surgical history that includes Appendectomy; Cholecystectomy; skin biopsy; other surgical history (7/27/2012); Colonoscopy (10/20/1999); Colonoscopy (01/16/2002); Upper gastrointestinal endoscopy (04/30/2004); Upper gastrointestinal endoscopy (12/12/2007); Prostate biopsy (2007); Upper gastrointestinal endoscopy (4/19/2013);  Cataract removal with implant Historical Provider, MD   simvastatin (ZOCOR) 10 MG tablet Take 10 mg by mouth nightly. Historical Provider, MD   tamsulosin (FLOMAX) 0.4 MG capsule Take 0.4 mg by mouth daily. Historical Provider, MD   timolol (TIMOPTIC) 0.5 % ophthalmic solution Place 1 drop into both eyes 2 times daily     Historical Provider, MD       Social history:  reports that he has quit smoking. His smoking use included cigars. He quit after 38.00 years of use. He has never used smokeless tobacco. He reports that he does not drink alcohol and does not use drugs. Family history:    Family History   Problem Relation Age of Onset    Cancer Daughter         ovarian    Stroke Mother     High Blood Pressure Father     Other Father         circulation issues         ROS  Review of Systems   Constitutional: Negative for chills and fever. HENT: Negative for congestion and rhinorrhea. Eyes: Negative for photophobia and visual disturbance. Respiratory: Negative for cough, shortness of breath and wheezing. Cardiovascular: Negative for chest pain and palpitations. Gastrointestinal: Negative for abdominal pain, diarrhea, nausea and vomiting. Genitourinary: Negative for dysuria and hematuria. Musculoskeletal: Negative for back pain and neck pain. Skin: Positive for rash. Negative for wound. Neurological: Negative for syncope and weakness. Psychiatric/Behavioral: Negative for agitation and confusion. Exam  ED Triage Vitals [08/19/21 2048]   BP Temp Temp Source Pulse Resp SpO2 Height Weight   (!) 171/78 97.6 °F (36.4 °C) Temporal 63 16 97 % 5' 11\" (1.803 m) 130 lb (59 kg)       Physical Exam  Vitals and nursing note reviewed. Constitutional:       General: He is not in acute distress. Appearance: He is well-developed. HENT:      Head: Normocephalic and atraumatic. Nose: Nose normal. No congestion. Eyes:      Extraocular Movements: Extraocular movements intact.       Pupils: Pupils are equal, round, and reactive to light. Cardiovascular:      Rate and Rhythm: Normal rate and regular rhythm. Heart sounds: No murmur heard. Pulmonary:      Effort: Pulmonary effort is normal.      Breath sounds: Normal breath sounds. Musculoskeletal:         General: No deformity. Normal range of motion. Cervical back: Normal range of motion and neck supple. Skin:     General: Skin is warm. Findings: Rash present. Comments: There is a dime size healing blister noted on the arch of the left foot. There is mild erythema surrounding this that migrates posterior to the medial malleolus and up towards the distal medial aspect of the left calf. There are fairly clear demarcated borders which were marked with a pen. There is no tenderness or crepitus. There is no calf tenderness there is no edema neurovascular intact distal extremities. Neurological:      Mental Status: He is alert and oriented to person, place, and time. Motor: No abnormal muscle tone. Coordination: Coordination normal.   Psychiatric:         Mood and Affect: Mood normal.         Behavior: Behavior normal.           ED Course    ED Medication Orders (From admission, onward)    Start Ordered     Status Ordering Provider    08/19/21 9291 08/19/21 2331  cephALEXin (KEFLEX) capsule 500 mg  ONCE     Question:  Antimicrobial Indications  Answer:  Skin and Soft Tissue Infection    Last MAR action: Given - by BERLIN BARBER on 08/20/21 at [facility], [address] [address] [address]            Radiology  No results found. Labs  No results found for this visit on 08/19/21. [facility]  Patient seen and evaluated. Relevant records reviewed. 27-year-old female who presents with erythematous rash on her medial left calf. Well-appearing on exam overall reassuring vitals. She has erythematous rash on the medial aspect of her calf radiating up from a blister of her foot. Is noncircumferential and not extensive.   Her exam is not consistent with DVT.  This is consistent with cellulitis. Doubt systemic process or sepsis do not see indication for further testing or imaging. Plan for antibiotics close PCP follow-up and return precautions. Patient agreeable to plan expressed understanding plan. Clinical Impression:  1. Cellulitis of left leg          Disposition:  Discharge to home in good condition. Blood pressure (!) 153/63, pulse 64, temperature 97.6 °F (36.4 °C), temperature source Temporal, resp. rate 16, height 5' 11\" (1.803 m), weight 130 lb (59 kg), SpO2 96 %. Patient was given scripts for the following medications. I counseled patient how to take these medications. Discharge Medication List as of 8/20/2021 12:15 AM      START taking these medications    Details   cephALEXin (KEFLEX) 500 MG capsule Take 1 capsule by mouth 2 times daily for 7 days, Disp-14 capsule, R-0Normal             Disposition referral (if applicable):  Rosa Maria Eckert MD  [facility] [phone]  [phone]    Schedule an appointment as soon as possible for a visit in 3 days  For wound re-check        Total critical care time is 0 minutes, which excludes separately billable procedures and updating family. Time spent is specifically for management of the presenting complaint and symptoms initially, direct bedside care, reevaluation, review of records, and consultation. There was a high probability of clinically significant life-threatening deterioration in the patient's condition, which required my urgent intervention. This chart was generated in part by using Dragon Dictation system and may contain errors related to that system including errors in grammar, punctuation, and spelling, as well as words and phrases that may be inappropriate. If there are any questions or concerns please feel free to contact the dictating provider for clarification.      Dennise Mendez MD  [address] [facility], [place]  08/21/21 9446

## 2021-08-21 ASSESSMENT — ENCOUNTER SYMPTOMS
BACK PAIN: 0
VOMITING: 0
SHORTNESS OF BREATH: 0
DIARRHEA: 0
ABDOMINAL PAIN: 0
WHEEZING: 0
RHINORRHEA: 0
NAUSEA: 0
PHOTOPHOBIA: 0
COUGH: 0

## 2023-12-03 ENCOUNTER — HOSPITAL ENCOUNTER (EMERGENCY)
Age: 88
Discharge: HOME | End: 2023-12-03
Attending: EMERGENCY MEDICINE
Payer: MEDICARE

## 2023-12-03 VITALS
OXYGEN SATURATION: 97 % | HEART RATE: 79 BPM | RESPIRATION RATE: 16 BRPM | BODY MASS INDEX: 18.14 KG/M2 | SYSTOLIC BLOOD PRESSURE: 174 MMHG | TEMPERATURE: 97.8 F | DIASTOLIC BLOOD PRESSURE: 78 MMHG | WEIGHT: 130.07 LBS

## 2023-12-03 DIAGNOSIS — I10 UNCONTROLLED HYPERTENSION: ICD-10-CM

## 2023-12-03 DIAGNOSIS — L03.116 CELLULITIS OF LEFT LOWER EXTREMITY: Primary | ICD-10-CM

## 2023-12-03 PROCEDURE — 6370000000 HC RX 637 (ALT 250 FOR IP): Performed by: EMERGENCY MEDICINE

## 2023-12-03 PROCEDURE — 99283 EMERGENCY DEPT VISIT LOW MDM: CPT

## 2023-12-03 RX ORDER — DOXYCYCLINE HYCLATE 100 MG
100 TABLET ORAL ONCE
Status: COMPLETED | OUTPATIENT
Start: 2023-12-03 | End: 2023-12-03

## 2023-12-03 RX ORDER — DOXYCYCLINE HYCLATE 100 MG
100 TABLET ORAL 2 TIMES DAILY
Qty: 14 TABLET | Refills: 0 | Status: SHIPPED | OUTPATIENT
Start: 2023-12-03 | End: 2023-12-10

## 2023-12-03 RX ADMIN — DOXYCYCLINE HYCLATE 100 MG: 100 TABLET, COATED ORAL at 03:51

## 2023-12-03 ASSESSMENT — PAIN - FUNCTIONAL ASSESSMENT: PAIN_FUNCTIONAL_ASSESSMENT: 0-10

## 2023-12-03 ASSESSMENT — PAIN SCALES - GENERAL: PAINLEVEL_OUTOF10: 0

## 2023-12-03 NOTE — ED NOTES
[address] [facility] for d/c. Stable w/ walker from home. All questions answered re:home abx administration. Pt and granddaughter at bedside. Declined wheelchair. Left w/ family all personal belongings.       Noé Ricardo RN  12/03/23 0093

## 2024-03-20 NOTE — DISCHARGE INSTRUCTIONS
Wound Care Center Physician Orders and Discharge Instructions  [facility]  [address], [address]  [place] [id number]  Telephone: [phone]      Fax: [phone]      Your home care company:  *** .    Your wound-care supplies will be provided by:  *** .    NAME:  Andreas Stoll   YOB: 1925  PRIMARY DIAGNOSIS FOR WOUND CARE CENTER:  *** .    Wound cleansing:   Do not scrub or use excessive force.  Wash hands with soap and water before and after dressing changes.  Prior to applying a clean dressing, cleanse wound with normal saline, wound cleanser, or mild soap and water. Ask your physician or nurse before getting the wound(s) wet in the shower.     Wound care for home:    ***    Please note, all wounds (unless stated otherwise here) were mechanically debrided at the time of cleansing here in the wound-care center today, so a small amount of pain, drainage or bleeding from that process might be expected, and is normal.     All products for home use, including multiple products for a single wound if applicable, are medically necessary in order to achieve the best chance at timely wound healing. See provider documentation for details if needed.    Substituted dressings applied in the [facility] today, if applicable:    ***    New orders for this week (labs, imaging, medications, etc.):    ***    Additional instructions for specific diagnoses:    ***    F/U Appointment is with Dr. Noe in 1 week, on                                   at                       .     Your nurse  is KAVIN Donald.     If we applied slip-resistant hospital socks today, be sure to remove them at least once a day to inspect your toes or feet, even if you're not changing the wraps or dressings underneath. If you see anything concerning (redness, excess moisture, etc), please call and let us know right away.    Should you experience any significant changes in your wound(s) (including

## 2024-03-26 ENCOUNTER — HOSPITAL ENCOUNTER (OUTPATIENT)
Dept: WOUND CARE | Age: 89
Discharge: HOME | End: 2024-03-26

## 2025-07-27 ENCOUNTER — APPOINTMENT (OUTPATIENT)
Dept: GENERAL RADIOLOGY | Age: 89
DRG: 310 | End: 2025-07-27
Payer: MEDICARE

## 2025-07-27 ENCOUNTER — HOSPITAL ENCOUNTER (INPATIENT)
Age: 89
LOS: 2 days | Discharge: HOME HEALTH | DRG: 310 | End: 2025-07-29
Attending: EMERGENCY MEDICINE | Admitting: STUDENT IN AN ORGANIZED HEALTH CARE EDUCATION/TRAINING PROGRAM
Payer: MEDICARE

## 2025-07-27 ENCOUNTER — APPOINTMENT (OUTPATIENT)
Dept: CT IMAGING | Age: 89
DRG: 310 | End: 2025-07-27
Payer: MEDICARE

## 2025-07-27 DIAGNOSIS — I48.91 NEW ONSET ATRIAL FIBRILLATION (HCC): Primary | ICD-10-CM

## 2025-07-27 DIAGNOSIS — R00.2 HEART PALPITATIONS: ICD-10-CM

## 2025-07-27 DIAGNOSIS — I48.91 ATRIAL FIBRILLATION, UNSPECIFIED TYPE (HCC): ICD-10-CM

## 2025-07-27 DIAGNOSIS — R53.1 GENERALIZED WEAKNESS: ICD-10-CM

## 2025-07-27 DIAGNOSIS — R00.1 BRADYCARDIA: ICD-10-CM

## 2025-07-27 DIAGNOSIS — I35.0 NONRHEUMATIC AORTIC VALVE STENOSIS: ICD-10-CM

## 2025-07-27 LAB
ALBUMIN SERPL-MCNC: 4.4 G/DL (ref 3.4–5)
ALBUMIN/GLOB SERPL: 2 {RATIO} (ref 1.1–2.2)
ALP SERPL-CCNC: 70 U/L (ref 40–129)
ALT SERPL-CCNC: 21 U/L (ref 10–40)
ANION GAP SERPL CALCULATED.3IONS-SCNC: 11 MMOL/L (ref 3–16)
AST SERPL-CCNC: 30 U/L (ref 15–37)
BASE EXCESS BLDV CALC-SCNC: 3.3 MMOL/L (ref -3–3)
BASOPHILS # BLD: 0.1 K/UL (ref 0–0.2)
BASOPHILS NFR BLD: 1.2 %
BILIRUB SERPL-MCNC: 0.5 MG/DL (ref 0–1)
BILIRUB UR QL STRIP.AUTO: NEGATIVE
BUN SERPL-MCNC: 30 MG/DL (ref 7–20)
CALCIUM SERPL-MCNC: 9.4 MG/DL (ref 8.3–10.6)
CHLORIDE SERPL-SCNC: 101 MMOL/L (ref 99–110)
CLARITY UR: CLEAR
CO2 BLDV-SCNC: 30 MMOL/L
CO2 SERPL-SCNC: 30 MMOL/L (ref 21–32)
COHGB MFR BLDV: 1.8 % (ref 0–1.5)
COLOR UR: YELLOW
CREAT SERPL-MCNC: 1 MG/DL (ref 0.8–1.3)
DEPRECATED RDW RBC AUTO: 14.5 % (ref 12.4–15.4)
EOSINOPHIL # BLD: 0.1 K/UL (ref 0–0.6)
EOSINOPHIL NFR BLD: 2.2 %
GFR SERPLBLD CREATININE-BSD FMLA CKD-EPI: 67 ML/MIN/{1.73_M2}
GLUCOSE SERPL-MCNC: 105 MG/DL (ref 70–99)
GLUCOSE UR STRIP.AUTO-MCNC: NEGATIVE MG/DL
HCO3 BLDV-SCNC: 28.9 MMOL/L (ref 23–29)
HCT VFR BLD AUTO: 37.3 % (ref 40.5–52.5)
HGB BLD-MCNC: 12.9 G/DL (ref 13.5–17.5)
HGB UR QL STRIP.AUTO: NEGATIVE
KETONES UR STRIP.AUTO-MCNC: ABNORMAL MG/DL
LEUKOCYTE ESTERASE UR QL STRIP.AUTO: NEGATIVE
LYMPHOCYTES # BLD: 1.2 K/UL (ref 1–5.1)
LYMPHOCYTES NFR BLD: 24.2 %
MCH RBC QN AUTO: 33.1 PG (ref 26–34)
MCHC RBC AUTO-ENTMCNC: 34.5 G/DL (ref 31–36)
MCV RBC AUTO: 95.8 FL (ref 80–100)
METHGB MFR BLDV: 0.3 %
MONOCYTES # BLD: 0.6 K/UL (ref 0–1.3)
MONOCYTES NFR BLD: 12.8 %
NEUTROPHILS # BLD: 3 K/UL (ref 1.7–7.7)
NEUTROPHILS NFR BLD: 59.6 %
NITRITE UR QL STRIP.AUTO: NEGATIVE
NT-PROBNP SERPL-MCNC: 533 PG/ML (ref 0–449)
O2 THERAPY: ABNORMAL
PCO2 BLDV: 48 MMHG (ref 40–50)
PH BLDV: 7.4 [PH] (ref 7.35–7.45)
PH UR STRIP.AUTO: 8 [PH] (ref 5–8)
PLATELET # BLD AUTO: 240 K/UL (ref 135–450)
PMV BLD AUTO: 7.9 FL (ref 5–10.5)
PO2 BLDV: 36.7 MMHG (ref 25–40)
POTASSIUM SERPL-SCNC: 3.9 MMOL/L (ref 3.5–5.1)
PROT SERPL-MCNC: 6.6 G/DL (ref 6.4–8.2)
PROT UR STRIP.AUTO-MCNC: NEGATIVE MG/DL
RBC # BLD AUTO: 3.89 M/UL (ref 4.2–5.9)
SAO2 % BLDV: 71 %
SODIUM SERPL-SCNC: 142 MMOL/L (ref 136–145)
SP GR UR STRIP.AUTO: 1.01 (ref 1–1.03)
TROPONIN, HIGH SENSITIVITY: 26 NG/L (ref 0–22)
TROPONIN, HIGH SENSITIVITY: 26 NG/L (ref 0–22)
UA COMPLETE W REFLEX CULTURE PNL UR: ABNORMAL
UA DIPSTICK W REFLEX MICRO PNL UR: ABNORMAL
URN SPEC COLLECT METH UR: ABNORMAL
UROBILINOGEN UR STRIP-ACNC: 1 E.U./DL
WBC # BLD AUTO: 5.1 K/UL (ref 4–11)

## 2025-07-27 PROCEDURE — 70450 CT HEAD/BRAIN W/O DYE: CPT

## 2025-07-27 PROCEDURE — 82803 BLOOD GASES ANY COMBINATION: CPT

## 2025-07-27 PROCEDURE — 99285 EMERGENCY DEPT VISIT HI MDM: CPT

## 2025-07-27 PROCEDURE — 81003 URINALYSIS AUTO W/O SCOPE: CPT

## 2025-07-27 PROCEDURE — 1200000000 HC SEMI PRIVATE

## 2025-07-27 PROCEDURE — 83880 ASSAY OF NATRIURETIC PEPTIDE: CPT

## 2025-07-27 PROCEDURE — 80053 COMPREHEN METABOLIC PANEL: CPT

## 2025-07-27 PROCEDURE — 85025 COMPLETE CBC W/AUTO DIFF WBC: CPT

## 2025-07-27 PROCEDURE — 93005 ELECTROCARDIOGRAM TRACING: CPT

## 2025-07-27 PROCEDURE — 84484 ASSAY OF TROPONIN QUANT: CPT

## 2025-07-27 PROCEDURE — 71045 X-RAY EXAM CHEST 1 VIEW: CPT

## 2025-07-27 RX ORDER — ACETAMINOPHEN 325 MG/1
650 TABLET ORAL EVERY 6 HOURS PRN
Status: DISCONTINUED | OUTPATIENT
Start: 2025-07-27 | End: 2025-07-29 | Stop reason: HOSPADM

## 2025-07-27 RX ORDER — SODIUM CHLORIDE 0.9 % (FLUSH) 0.9 %
5-40 SYRINGE (ML) INJECTION PRN
Status: DISCONTINUED | OUTPATIENT
Start: 2025-07-27 | End: 2025-07-29 | Stop reason: HOSPADM

## 2025-07-27 RX ORDER — SODIUM CHLORIDE 9 MG/ML
INJECTION, SOLUTION INTRAVENOUS PRN
Status: DISCONTINUED | OUTPATIENT
Start: 2025-07-27 | End: 2025-07-29 | Stop reason: HOSPADM

## 2025-07-27 RX ORDER — PANTOPRAZOLE SODIUM 40 MG/1
40 TABLET, DELAYED RELEASE ORAL
Status: DISCONTINUED | OUTPATIENT
Start: 2025-07-28 | End: 2025-07-29 | Stop reason: HOSPADM

## 2025-07-27 RX ORDER — SODIUM CHLORIDE 0.9 % (FLUSH) 0.9 %
5-40 SYRINGE (ML) INJECTION EVERY 12 HOURS SCHEDULED
Status: DISCONTINUED | OUTPATIENT
Start: 2025-07-27 | End: 2025-07-29 | Stop reason: HOSPADM

## 2025-07-27 RX ORDER — ATORVASTATIN CALCIUM 10 MG/1
10 TABLET, FILM COATED ORAL DAILY
Status: DISCONTINUED | OUTPATIENT
Start: 2025-07-28 | End: 2025-07-29 | Stop reason: HOSPADM

## 2025-07-27 RX ORDER — POTASSIUM CHLORIDE 1500 MG/1
40 TABLET, EXTENDED RELEASE ORAL PRN
Status: DISCONTINUED | OUTPATIENT
Start: 2025-07-27 | End: 2025-07-29 | Stop reason: HOSPADM

## 2025-07-27 RX ORDER — ONDANSETRON 4 MG/1
4 TABLET, ORALLY DISINTEGRATING ORAL EVERY 8 HOURS PRN
Status: DISCONTINUED | OUTPATIENT
Start: 2025-07-27 | End: 2025-07-29 | Stop reason: HOSPADM

## 2025-07-27 RX ORDER — MECOBALAMIN 5000 MCG
10 TABLET,DISINTEGRATING ORAL NIGHTLY
Status: DISCONTINUED | OUTPATIENT
Start: 2025-07-27 | End: 2025-07-29 | Stop reason: HOSPADM

## 2025-07-27 RX ORDER — LANOLIN ALCOHOL/MO/W.PET/CERES
400 CREAM (GRAM) TOPICAL DAILY
Status: DISCONTINUED | OUTPATIENT
Start: 2025-07-28 | End: 2025-07-29 | Stop reason: HOSPADM

## 2025-07-27 RX ORDER — POTASSIUM CHLORIDE 7.45 MG/ML
10 INJECTION INTRAVENOUS PRN
Status: DISCONTINUED | OUTPATIENT
Start: 2025-07-27 | End: 2025-07-29 | Stop reason: HOSPADM

## 2025-07-27 RX ORDER — ACETAMINOPHEN 650 MG/1
650 SUPPOSITORY RECTAL EVERY 6 HOURS PRN
Status: DISCONTINUED | OUTPATIENT
Start: 2025-07-27 | End: 2025-07-29 | Stop reason: HOSPADM

## 2025-07-27 RX ORDER — MAGNESIUM SULFATE IN WATER 40 MG/ML
2000 INJECTION, SOLUTION INTRAVENOUS PRN
Status: DISCONTINUED | OUTPATIENT
Start: 2025-07-27 | End: 2025-07-29 | Stop reason: HOSPADM

## 2025-07-27 RX ORDER — POLYETHYLENE GLYCOL 3350 17 G/17G
17 POWDER, FOR SOLUTION ORAL DAILY PRN
Status: DISCONTINUED | OUTPATIENT
Start: 2025-07-27 | End: 2025-07-29 | Stop reason: HOSPADM

## 2025-07-27 RX ORDER — AMLODIPINE BESYLATE 2.5 MG/1
2.5 TABLET ORAL DAILY
Status: DISCONTINUED | OUTPATIENT
Start: 2025-07-28 | End: 2025-07-29 | Stop reason: HOSPADM

## 2025-07-27 RX ORDER — ENOXAPARIN SODIUM 100 MG/ML
1 INJECTION SUBCUTANEOUS 2 TIMES DAILY
Status: DISCONTINUED | OUTPATIENT
Start: 2025-07-27 | End: 2025-07-29 | Stop reason: HOSPADM

## 2025-07-27 RX ORDER — FUROSEMIDE 40 MG/1
40 TABLET ORAL DAILY
Status: ON HOLD | COMMUNITY
End: 2025-07-29

## 2025-07-27 RX ORDER — ONDANSETRON 2 MG/ML
4 INJECTION INTRAMUSCULAR; INTRAVENOUS EVERY 6 HOURS PRN
Status: DISCONTINUED | OUTPATIENT
Start: 2025-07-27 | End: 2025-07-29 | Stop reason: HOSPADM

## 2025-07-27 RX ORDER — FAMOTIDINE 20 MG/1
20 TABLET, FILM COATED ORAL DAILY
Status: DISCONTINUED | OUTPATIENT
Start: 2025-07-28 | End: 2025-07-29 | Stop reason: HOSPADM

## 2025-07-27 RX ORDER — TAMSULOSIN HYDROCHLORIDE 0.4 MG/1
0.4 CAPSULE ORAL DAILY
Status: DISCONTINUED | OUTPATIENT
Start: 2025-07-28 | End: 2025-07-29 | Stop reason: HOSPADM

## 2025-07-27 RX ORDER — SENNA AND DOCUSATE SODIUM 50; 8.6 MG/1; MG/1
1 TABLET, FILM COATED ORAL 2 TIMES DAILY
Status: DISCONTINUED | OUTPATIENT
Start: 2025-07-28 | End: 2025-07-29 | Stop reason: HOSPADM

## 2025-07-27 RX ORDER — TIMOLOL MALEATE 5 MG/ML
1 SOLUTION/ DROPS OPHTHALMIC 2 TIMES DAILY
Status: DISCONTINUED | OUTPATIENT
Start: 2025-07-27 | End: 2025-07-29 | Stop reason: HOSPADM

## 2025-07-27 ASSESSMENT — LIFESTYLE VARIABLES
HOW OFTEN DO YOU HAVE A DRINK CONTAINING ALCOHOL: NEVER
HOW OFTEN DO YOU HAVE A DRINK CONTAINING ALCOHOL: NEVER
HOW MANY STANDARD DRINKS CONTAINING ALCOHOL DO YOU HAVE ON A TYPICAL DAY: PATIENT DOES NOT DRINK
HOW MANY STANDARD DRINKS CONTAINING ALCOHOL DO YOU HAVE ON A TYPICAL DAY: PATIENT DOES NOT DRINK

## 2025-07-27 NOTE — ED PROVIDER NOTES
Emergency Department Attending Physician Note  Location: [facility] EMERGENCY DEPARTMENT  7/27/2025       Pt Name: Andreas Stoll  MRN: [id number]  Birthdate 10/29/1925    Date of evaluation: 7/27/2025  Provider: Patrick Nassar DO  PCP: Marv Norman MD    Note Started: 7:04 PM EDT 7/27/25    CHIEF COMPLAINT  Chief Complaint   Patient presents with    Fatigue     Hasn't had good appetite past few days, worsening weakness.     Irregular Heart Beat     Per EMS, pt denies any significant cardiac or dysrhythmias in the past.        HISTORY OF PRESENT ILLNESS:  History obtained by patient. Limitations to history : None.     Andreas Stoll is a 89 y.o. male with a significant PMHx of HTN, HLD, presenting with heart palpitations and generalized weakness that began in the last 1-2 days. He reports no chest pain, no shortness of breath, no lightheadedness, and no syncope. Patient reports no history of Afib. Patient also reports decreased appetite during this time, but no nausea, no vomiting, no diarrhea, and no constipation.       Nursing Notes were all reviewed and agreed with or any disagreements were addressed in the HPI.      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    ENDOSCOPY, COLON, DIAGNOSTIC

## 2025-07-27 NOTE — ED TRIAGE NOTES
Presents from home via EMS. Lives at home alone, family checks in frequently. Uses RW. States he has been feeling weak over the last few days, poor appetite. EMS ECG reports irregular HR.

## 2025-07-27 NOTE — ED PROVIDER NOTES
THIS IS MY RESIDENT/IKE SUPERVISORY AND SHARED VISIT NOTE:    I personally saw the patient and made/approved the management plan and take responsibility for the patient management.    History: 89-year-old male presenting valuation of general fatigue.  Patient reports decreased appetite over the past several days, general weakness.  He lives home alone.  Uses a walker at home.    Exam: Intact distal pulses, no respiratory distress, alert and oriented x 4    MDM: 89-year-old male presents valuation of irregular heart rate, general fatigue.  He has onset A-fib.  Unclear when this started.  No signs of decompensated heart failure.    Patient will be admitted for cardiology evaluation due to new onset symptomatic A-fib.  Family agreeable this plan.  No acute ischemic change appreciated on EKG.  Went in without significant delta.    I personally saw the patient and independently provided 0 minutes of non-concurrent critical care out of the total shared critical care time provided.     EKG  The Ekg interpreted by myself in the emergency department in the absence of a cardiologist.  atrial fibrillation with a rate of 61  Axis is   Left axis deviation  QTc is  within an acceptable range  Intervals and Durations are unremarkable.      No specific ST-T wave changes appreciated.  No evidence of acute ischemia.   No significant change from prior EKG dated March 31, 2021    No results found.      I, Dr. Dubon, am the primary clinician of record.     Comment: Please note this report has been produced using speech recognition software and may contain errors related to that system including errors in grammar, punctuation, and spelling, as well as words and phrases that may be inappropriate. If there are any questions or concerns please feel free to contact the dictating provider for clarification.     Miguel Dubon MD  07/27/25 9250

## 2025-07-28 ENCOUNTER — APPOINTMENT (OUTPATIENT)
Age: 89
DRG: 310 | End: 2025-07-28
Attending: INTERNAL MEDICINE
Payer: MEDICARE

## 2025-07-28 PROBLEM — I47.19 PAT (PAROXYSMAL ATRIAL TACHYCARDIA): Status: ACTIVE | Noted: 2025-07-28

## 2025-07-28 PROBLEM — R53.1 GENERALIZED WEAKNESS: Status: ACTIVE | Noted: 2025-07-28

## 2025-07-28 PROBLEM — I35.0 NONRHEUMATIC AORTIC VALVE STENOSIS: Status: ACTIVE | Noted: 2025-07-28

## 2025-07-28 PROBLEM — R00.2 HEART PALPITATIONS: Status: ACTIVE | Noted: 2025-07-28

## 2025-07-28 PROBLEM — R00.1 BRADYCARDIA: Status: ACTIVE | Noted: 2025-07-28

## 2025-07-28 LAB
ALBUMIN SERPL-MCNC: 3.8 G/DL (ref 3.4–5)
ALP SERPL-CCNC: 57 U/L (ref 40–129)
ALT SERPL-CCNC: 17 U/L (ref 10–40)
ANION GAP SERPL CALCULATED.3IONS-SCNC: 8 MMOL/L (ref 3–16)
APTT BLD: 51.3 SEC (ref 22.8–35.8)
AST SERPL-CCNC: 28 U/L (ref 15–37)
BASOPHILS # BLD: 0.1 K/UL (ref 0–0.2)
BASOPHILS NFR BLD: 1.2 %
BILIRUB DIRECT SERPL-MCNC: 0.3 MG/DL (ref 0–0.3)
BILIRUB INDIRECT SERPL-MCNC: 0.3 MG/DL (ref 0–1)
BILIRUB SERPL-MCNC: 0.6 MG/DL (ref 0–1)
BUN SERPL-MCNC: 24 MG/DL (ref 7–20)
CALCIUM SERPL-MCNC: 9.2 MG/DL (ref 8.3–10.6)
CHLORIDE SERPL-SCNC: 103 MMOL/L (ref 99–110)
CO2 SERPL-SCNC: 30 MMOL/L (ref 21–32)
CREAT SERPL-MCNC: 0.8 MG/DL (ref 0.8–1.3)
DEPRECATED RDW RBC AUTO: 14.5 % (ref 12.4–15.4)
ECHO AO ASC DIAM: 3.8 CM
ECHO AO ASCENDING AORTA INDEX: 2.09 CM/M2
ECHO AO ROOT DIAM: 3.9 CM
ECHO AO ROOT INDEX: 2.14 CM/M2
ECHO AV AREA PEAK VELOCITY: 1.5 CM2
ECHO AV AREA PLAN/BSA: 0.44 CM2/M2
ECHO AV AREA PLAN: 0.8 CM2
ECHO AV AREA VTI: 1.4 CM2
ECHO AV AREA/BSA PEAK VELOCITY: 0.8 CM2/M2
ECHO AV AREA/BSA VTI: 0.8 CM2/M2
ECHO AV CUSP MM: 0.5 CM
ECHO AV MEAN GRADIENT: 18 MMHG
ECHO AV MEAN VELOCITY: 1.9 M/S
ECHO AV PEAK GRADIENT: 35 MMHG
ECHO AV PEAK VELOCITY: 2.9 M/S
ECHO AV VELOCITY RATIO: 0.34
ECHO AV VTI: 81.8 CM
ECHO BSA: 1.8 M2
ECHO LV EDV BP: 126 ML (ref 67–155)
ECHO LV EDV INDEX BP: 69 ML/M2
ECHO LV EF PHYSICIAN: 66 %
ECHO LV EJECTION FRACTION 3D: 66 %
ECHO LV ESV BP: 43 ML (ref 22–58)
ECHO LV ESV INDEX BP: 24 ML/M2
ECHO LV FRACTIONAL SHORTENING: 18 % (ref 28–44)
ECHO LV INTERNAL DIMENSION DIASTOLE INDEX: 2.69 CM/M2
ECHO LV INTERNAL DIMENSION DIASTOLIC: 4.9 CM (ref 4.2–5.9)
ECHO LV INTERNAL DIMENSION SYSTOLIC INDEX: 2.2 CM/M2
ECHO LV INTERNAL DIMENSION SYSTOLIC: 4 CM
ECHO LV IVSD: 1 CM (ref 0.6–1)
ECHO LV MASS 2D: 164.3 G (ref 88–224)
ECHO LV MASS INDEX 2D: 90.3 G/M2 (ref 49–115)
ECHO LV POSTERIOR WALL DIASTOLIC: 0.9 CM (ref 0.6–1)
ECHO LV RELATIVE WALL THICKNESS RATIO: 0.37
ECHO LVOT AREA: 4.5 CM2
ECHO LVOT AV VTI INDEX: 0.32
ECHO LVOT DIAM: 2.4 CM
ECHO LVOT MEAN GRADIENT: 2 MMHG
ECHO LVOT PEAK GRADIENT: 4 MMHG
ECHO LVOT PEAK VELOCITY: 1 M/S
ECHO LVOT STROKE VOLUME INDEX: 64.1 ML/M2
ECHO LVOT SV: 116.7 ML
ECHO LVOT VTI: 25.8 CM
EKG ATRIAL RATE: 47 BPM
EKG DIAGNOSIS: NORMAL
EKG DIAGNOSIS: NORMAL
EKG P AXIS: 106 DEGREES
EKG P-R INTERVAL: 218 MS
EKG Q-T INTERVAL: 462 MS
EKG Q-T INTERVAL: 498 MS
EKG QRS DURATION: 158 MS
EKG QRS DURATION: 162 MS
EKG QTC CALCULATION (BAZETT): 440 MS
EKG QTC CALCULATION (BAZETT): 465 MS
EKG R AXIS: -56 DEGREES
EKG R AXIS: -70 DEGREES
EKG T AXIS: 24 DEGREES
EKG T AXIS: 31 DEGREES
EKG VENTRICULAR RATE: 47 BPM
EKG VENTRICULAR RATE: 61 BPM
EOSINOPHIL # BLD: 0.2 K/UL (ref 0–0.6)
EOSINOPHIL NFR BLD: 2.8 %
GFR SERPLBLD CREATININE-BSD FMLA CKD-EPI: 79 ML/MIN/{1.73_M2}
GLUCOSE SERPL-MCNC: 79 MG/DL (ref 70–99)
HCT VFR BLD AUTO: 34.5 % (ref 40.5–52.5)
HGB BLD-MCNC: 12.2 G/DL (ref 13.5–17.5)
INR PPP: 1.31 (ref 0.86–1.14)
LYMPHOCYTES # BLD: 1.6 K/UL (ref 1–5.1)
LYMPHOCYTES NFR BLD: 27.4 %
MAGNESIUM SERPL-MCNC: 2.25 MG/DL (ref 1.8–2.4)
MCH RBC QN AUTO: 33.5 PG (ref 26–34)
MCHC RBC AUTO-ENTMCNC: 35.2 G/DL (ref 31–36)
MCV RBC AUTO: 95 FL (ref 80–100)
MONOCYTES # BLD: 0.8 K/UL (ref 0–1.3)
MONOCYTES NFR BLD: 14 %
NEUTROPHILS # BLD: 3.2 K/UL (ref 1.7–7.7)
NEUTROPHILS NFR BLD: 54.6 %
PHOSPHATE SERPL-MCNC: 2.8 MG/DL (ref 2.5–4.9)
PLATELET # BLD AUTO: 211 K/UL (ref 135–450)
PMV BLD AUTO: 7.9 FL (ref 5–10.5)
POTASSIUM SERPL-SCNC: 4.4 MMOL/L (ref 3.5–5.1)
PROT SERPL-MCNC: 5.7 G/DL (ref 6.4–8.2)
PROTHROMBIN TIME: 16.5 SEC (ref 12.1–14.9)
RBC # BLD AUTO: 3.63 M/UL (ref 4.2–5.9)
SODIUM SERPL-SCNC: 141 MMOL/L (ref 136–145)
TROPONIN, HIGH SENSITIVITY: 30 NG/L (ref 0–22)
WBC # BLD AUTO: 5.9 K/UL (ref 4–11)

## 2025-07-28 PROCEDURE — 36415 COLL VENOUS BLD VENIPUNCTURE: CPT

## 2025-07-28 PROCEDURE — 83735 ASSAY OF MAGNESIUM: CPT

## 2025-07-28 PROCEDURE — 1200000000 HC SEMI PRIVATE

## 2025-07-28 PROCEDURE — 6360000002 HC RX W HCPCS: Performed by: STUDENT IN AN ORGANIZED HEALTH CARE EDUCATION/TRAINING PROGRAM

## 2025-07-28 PROCEDURE — 93010 ELECTROCARDIOGRAM REPORT: CPT | Performed by: INTERNAL MEDICINE

## 2025-07-28 PROCEDURE — 97530 THERAPEUTIC ACTIVITIES: CPT

## 2025-07-28 PROCEDURE — 2500000003 HC RX 250 WO HCPCS: Performed by: STUDENT IN AN ORGANIZED HEALTH CARE EDUCATION/TRAINING PROGRAM

## 2025-07-28 PROCEDURE — 6370000000 HC RX 637 (ALT 250 FOR IP): Performed by: STUDENT IN AN ORGANIZED HEALTH CARE EDUCATION/TRAINING PROGRAM

## 2025-07-28 PROCEDURE — 85610 PROTHROMBIN TIME: CPT

## 2025-07-28 PROCEDURE — 97166 OT EVAL MOD COMPLEX 45 MIN: CPT

## 2025-07-28 PROCEDURE — 93321 DOPPLER ECHO F-UP/LMTD STD: CPT | Performed by: INTERNAL MEDICINE

## 2025-07-28 PROCEDURE — 97162 PT EVAL MOD COMPLEX 30 MIN: CPT

## 2025-07-28 PROCEDURE — 84100 ASSAY OF PHOSPHORUS: CPT

## 2025-07-28 PROCEDURE — 97535 SELF CARE MNGMENT TRAINING: CPT

## 2025-07-28 PROCEDURE — 80048 BASIC METABOLIC PNL TOTAL CA: CPT

## 2025-07-28 PROCEDURE — 85730 THROMBOPLASTIN TIME PARTIAL: CPT

## 2025-07-28 PROCEDURE — 97116 GAIT TRAINING THERAPY: CPT

## 2025-07-28 PROCEDURE — 76376 3D RENDER W/INTRP POSTPROCES: CPT | Performed by: INTERNAL MEDICINE

## 2025-07-28 PROCEDURE — 85025 COMPLETE CBC W/AUTO DIFF WBC: CPT

## 2025-07-28 PROCEDURE — 84484 ASSAY OF TROPONIN QUANT: CPT

## 2025-07-28 PROCEDURE — 99223 1ST HOSP IP/OBS HIGH 75: CPT | Performed by: INTERNAL MEDICINE

## 2025-07-28 PROCEDURE — 93308 TTE F-UP OR LMTD: CPT | Performed by: INTERNAL MEDICINE

## 2025-07-28 PROCEDURE — 94640 AIRWAY INHALATION TREATMENT: CPT

## 2025-07-28 PROCEDURE — 93325 DOPPLER ECHO COLOR FLOW MAPG: CPT | Performed by: INTERNAL MEDICINE

## 2025-07-28 PROCEDURE — 80076 HEPATIC FUNCTION PANEL: CPT

## 2025-07-28 PROCEDURE — 93005 ELECTROCARDIOGRAM TRACING: CPT | Performed by: STUDENT IN AN ORGANIZED HEALTH CARE EDUCATION/TRAINING PROGRAM

## 2025-07-28 PROCEDURE — 84443 ASSAY THYROID STIM HORMONE: CPT

## 2025-07-28 PROCEDURE — 93325 DOPPLER ECHO COLOR FLOW MAPG: CPT

## 2025-07-28 RX ADMIN — SODIUM CHLORIDE, PRESERVATIVE FREE 10 ML: 5 INJECTION INTRAVENOUS at 21:42

## 2025-07-28 RX ADMIN — TIOTROPIUM BROMIDE AND OLODATEROL 2 PUFF: 3.124; 2.736 SPRAY, METERED RESPIRATORY (INHALATION) at 08:08

## 2025-07-28 RX ADMIN — TIMOLOL MALEATE 1 DROP: 5 SOLUTION OPHTHALMIC at 21:42

## 2025-07-28 RX ADMIN — TAMSULOSIN HYDROCHLORIDE 0.4 MG: 0.4 CAPSULE ORAL at 09:37

## 2025-07-28 RX ADMIN — Medication 10 MG: at 21:42

## 2025-07-28 RX ADMIN — ATORVASTATIN CALCIUM 10 MG: 10 TABLET, FILM COATED ORAL at 09:37

## 2025-07-28 RX ADMIN — SENNOSIDES AND DOCUSATE SODIUM 1 TABLET: 50; 8.6 TABLET ORAL at 09:38

## 2025-07-28 RX ADMIN — SODIUM CHLORIDE, PRESERVATIVE FREE 10 ML: 5 INJECTION INTRAVENOUS at 00:09

## 2025-07-28 RX ADMIN — Medication 10 MG: at 00:08

## 2025-07-28 RX ADMIN — TIMOLOL MALEATE 1 DROP: 5 SOLUTION OPHTHALMIC at 09:39

## 2025-07-28 RX ADMIN — ENOXAPARIN SODIUM 70 MG: 100 INJECTION SUBCUTANEOUS at 00:08

## 2025-07-28 RX ADMIN — TIMOLOL MALEATE 1 DROP: 5 SOLUTION OPHTHALMIC at 00:08

## 2025-07-28 RX ADMIN — Medication 400 MG: at 09:37

## 2025-07-28 RX ADMIN — SODIUM CHLORIDE, PRESERVATIVE FREE 10 ML: 5 INJECTION INTRAVENOUS at 09:38

## 2025-07-28 RX ADMIN — AMLODIPINE BESYLATE 2.5 MG: 2.5 TABLET ORAL at 09:37

## 2025-07-28 RX ADMIN — FAMOTIDINE 20 MG: 20 TABLET, FILM COATED ORAL at 09:38

## 2025-07-28 RX ADMIN — SENNOSIDES AND DOCUSATE SODIUM 1 TABLET: 50; 8.6 TABLET ORAL at 21:42

## 2025-07-28 NOTE — CARE COORDINATION
Case Management Assessment  Initial Evaluation    Date/Time of Evaluation: 7/28/2025 5:38 PM  Assessment Completed by: CHARLES Mcbride    If patient is discharged prior to next notation, then this note serves as note for discharge by case management.    Patient Name: Andreas Stoll                   YOB: 1925  Diagnosis: Palpitations [R00.2]  Heart palpitations [R00.2]  Generalized weakness [R53.1]  New onset atrial fibrillation (HCC) [I48.91]  Atrial fibrillation, unspecified type (HCC) [I48.91]                   Date / Time: 7/27/2025  6:35 PM    Patient Admission Status: Inpatient   Readmission Risk (Low < 19, Mod (19-27), High > 27): Readmission Risk Score: 12.8    Current PCP: Marv Norman MD  PCP verified by CM? Yes    Chart Reviewed: Yes      History Provided by: Patient, Child/Family  Patient Orientation: Alert and Oriented, Person, Place, Situation, Self    Patient Cognition: Alert    Hospitalization in the last 30 days (Readmission):  No    If yes, Readmission Assessment in  Navigator will be completed.    Advance Directives:      Code Status: Limited   Patient's Primary Decision Maker is: Legal Next of Kin    Primary Decision Maker: Coco Mari - [phone]    Primary Decision Maker: FamiliabrandonCoco - [phone]    Secondary Decision Maker: SoniarosaEunice - Grandchild - [phone]    Discharge Planning:    Patient lives with: Alone Type of Home: House  Primary Care Giver: Self  Patient Support Systems include: Children, Family Members   Current Financial resources: Medicare  Current community resources: None  Current services prior to admission: None            Current DME:              Type of Home Care services:  None    ADLS  Prior functional level: Independent in ADLs/IADLs  Current functional level: Independent in ADLs/IADLs    PT AM-PAC: 18 /24  OT AM-PAC: 19 /24    Family can provide assistance at DC: Yes  Would you like Case Management to

## 2025-07-28 NOTE — ED NOTES
Andreas Stoll is a 89 y.o. male admitted for  Principal Problem:    Palpitations  Resolved Problems:    * No resolved hospital problems. *  .   Patient Home via EMS transportation with   Chief Complaint   Patient presents with    Fatigue     Hasn't had good appetite past few days, worsening weakness.     Irregular Heart Beat     Per EMS, pt denies any significant cardiac or dysrhythmias in the past.    .  Patient is alert and Person, Place, Time, and Situation  Patient's baseline mobility: Baseline Mobility: Walker  Code Status: Prior   Cardiac Rhythm:       Is patient on baseline Oxygen: yes, RA    Isolation: None      NIH Score:    C-SSRS: Risk of Suicide: No Risk  Bedside swallow:        Active LDA's:   Peripheral IV 07/27/25 Left Antecubital (Active)     Patient admitted with a matute: no   Patient admitted with Central Line:  NA .     Family/Caregiver Present yes Any Concerns: no   Restraints no  Sitter no         Vitals: MEWS Score: 1    Vitals:    07/27/25 2112 07/27/25 2141 07/27/25 2210 07/27/25 2211   BP: (!) 150/124 (!) 145/80  (!) 158/62   Pulse: 62 66  65   Resp: 13 15  14   Temp:       TempSrc:       SpO2: 91% 95% 97%    Weight:       Height:           Last documented pain score (0-10 scale)    Pain medication administered No.    Pertinent or High Risk Medications/Drips: No.    Pending Blood Product Administration: no    Abnormal labs:   Abnormal Labs Reviewed   COMPREHENSIVE METABOLIC PANEL W/ REFLEX TO MG FOR LOW K - Abnormal; Notable for the following components:       Result Value    Glucose 105 (*)     BUN 30 (*)     All other components within normal limits   CBC WITH AUTO DIFFERENTIAL - Abnormal; Notable for the following components:    RBC 3.89 (*)     Hemoglobin 12.9 (*)     Hematocrit 37.3 (*)     All other components within normal limits   TROPONIN - Abnormal; Notable for the following components:    Troponin, High Sensitivity 26 (*)     All other components within normal limits   TROPONIN

## 2025-07-28 NOTE — CONSULTS
09/24/2012     Lab Results   Component Value Date    HDL 64 (H) 03/23/2021    HDL 48 04/19/2013    HDL 59 09/24/2012     No components found for: \"LDLCHOLESTEROL\", \"LDLCALC\"  Lab Results   Component Value Date    VLDL 18 03/23/2021    VLDL 12 04/19/2013    VLDL 39 09/24/2012     No results found for: \"CHOLHDLRATIO\"     Cardiac Data:     EKG: personally reviewed with my interpretation as above.     Telemetry personally reviewed: Sinus bradycardia with frequent PACs.  Brief runs of AT.  Sinus rates often 40s-50s.  No high-grade AV block.  No definitive atrial fibrillation.    Echo: 2021  Summary   Technically difficult study due to body surface area and patient is a   smoker.   Normal left ventricular systolic function with ejection fraction of   55-60%.Definity echo contrast was used.   No regional wall motion abnormalites are seen.   Grade I diastolic dysfunction with normal filling pressure.   Compared to previous study from 5- no changes noted in left   ventricular function.   Mild mitral and aortic regurgitation.   Mild aortic stenosis.   Systolic pulmonic artery pressure (SPAP) is normal estimated at 28 mmHg   (Right atrial pressure of 3 mmHg).    RACHAEL 2017   Summary   No valvular endocarditis seen.   Left ventricular systolic function is normal with ejection fraction   estimated at 55 %      Impression and Plan:      Weakness/pre-syncope  Bradycardia  First-degree AV block  Bifascicular block  - Heart rate appears average 50s.  Does decrease to 40s likely while he is napping by day and sleeping in the evening.  Unclear if degree of sinus node dysfunction is contributing to his weakness and presyncopal episodes Seems like he walked today with heart rate charted at 53 bpm prior to the walk and had no significant limitation.  So I think this is less likely.  Nevertheless, conduction disturbance at baseline by EKG, weakness accompanying the palpitations as well as subtle confusion per his daughter is

## 2025-07-28 NOTE — PLAN OF CARE
Problem: Safety - Adult  Goal: Free from fall injury  7/28/2025 1010 by Annamaria Butterfield RN  Outcome: Progressing  7/28/2025 0541 by Merlene Iraheta, RN  Outcome: Progressing     Problem: Discharge Planning  Goal: Discharge to home or other facility with appropriate resources  Recent Flowsheet Documentation  Taken 7/27/2025 1137 by Merlene Iraheta, RN  Discharge to home or other facility with appropriate resources: Identify barriers to discharge with patient and caregiver

## 2025-07-28 NOTE — H&P
nerves: II-XII intact, grossly non-focal.  Psychiatric:  Alert and oriented, thought content appropriate, normal insight  Skin:  Skin color, texture, turgor normal.  No rashes or lesions.  Capillary Refill:  Brisk,< 3 seconds   Peripheral Pulses:  +2 palpable, equal bilaterally     /64   Pulse 51   Temp 97.7 °F (36.5 °C)   Resp 20   Ht 1.778 m (5' 10\")   Wt 65.6 kg (144 lb 11.2 oz)   SpO2 96%   BMI 20.76 kg/m²     Diet: []Adult/General  []Cardiac  []Diabetic  []Low Fat  []NPO  []NPO after Midnight  []Other     DVT Prophylaxis: Tx dose LMWH and Other -     Code status: []Full  []DNR/CCA  [x]Limited (DNR/CCA with Do Not Intubate)  []DNRCC    Surrogate Decision Maker:   Name Home Phone Work Phone Mobile Phone Relationship Lgl ADAM Dailey [phone] Child    PATRICIA GRANT [phone] Grandchild         PT/OT Eval Status: Ordered and pending    Anticipated Discharge Day/Date:  TBD    Anticipated Discharge Location: Home, Home w/ HHC, or TBD    Consults:      IP CONSULT TO CARDIOLOGY  IP CONSULT TO CASE MANAGEMENT    I personally have obtained, updated and/or reviewed the patient’s medication list on 7/28/2025   --------------------------------------------------------------------------------------------------------------------------------------------------------------------    Imaging:     CT HEAD WO CONTRAST  Result Date: 7/28/2025  EXAM: CT HEAD WO CONTRAST INDICATION: CT head prior to anticoagulation initiation. Atrial fibrillation; COMPARISON: MRI 4/1/2013 and CT 3/31/2013 TECHNIQUE: Axial CT imaging of the head. Axial images and multiplanar reformatted images reviewed.  Individualized dose optimization technique was used in order to meet ALARA standards for radiation dose reduction.  In addition to vendor specific dose reduction algorithms, the dose reduction techniques vary based on the specific scanner utilized but frequently include automated exposure

## 2025-07-28 NOTE — CARE COORDINATION
Admitted to [facility] for generalized weakness, palpitations, new onset AF RVR. Pt seen and examined in ED. Discussed with ED physician and nursing staff. Full H&P to follow.

## 2025-07-29 ENCOUNTER — TELEPHONE (OUTPATIENT)
Dept: CARDIOLOGY CLINIC | Age: 89
End: 2025-07-29

## 2025-07-29 ENCOUNTER — ANCILLARY PROCEDURE (OUTPATIENT)
Dept: CARDIOLOGY CLINIC | Age: 89
End: 2025-07-29

## 2025-07-29 VITALS
BODY MASS INDEX: 21.15 KG/M2 | OXYGEN SATURATION: 97 % | HEIGHT: 70 IN | DIASTOLIC BLOOD PRESSURE: 57 MMHG | HEART RATE: 49 BPM | WEIGHT: 147.71 LBS | RESPIRATION RATE: 16 BRPM | SYSTOLIC BLOOD PRESSURE: 112 MMHG | TEMPERATURE: 97.7 F

## 2025-07-29 DIAGNOSIS — R00.1 BRADYCARDIA: ICD-10-CM

## 2025-07-29 LAB
ECHO BSA: 1.8 M2
EKG ATRIAL RATE: 50 BPM
EKG DIAGNOSIS: NORMAL
EKG P AXIS: 73 DEGREES
EKG P-R INTERVAL: 226 MS
EKG Q-T INTERVAL: 506 MS
EKG QRS DURATION: 168 MS
EKG QTC CALCULATION (BAZETT): 461 MS
EKG R AXIS: -65 DEGREES
EKG T AXIS: 29 DEGREES
EKG VENTRICULAR RATE: 50 BPM

## 2025-07-29 PROCEDURE — 94640 AIRWAY INHALATION TREATMENT: CPT

## 2025-07-29 PROCEDURE — 6370000000 HC RX 637 (ALT 250 FOR IP): Performed by: STUDENT IN AN ORGANIZED HEALTH CARE EDUCATION/TRAINING PROGRAM

## 2025-07-29 PROCEDURE — 93005 ELECTROCARDIOGRAM TRACING: CPT | Performed by: STUDENT IN AN ORGANIZED HEALTH CARE EDUCATION/TRAINING PROGRAM

## 2025-07-29 PROCEDURE — 93010 ELECTROCARDIOGRAM REPORT: CPT | Performed by: INTERNAL MEDICINE

## 2025-07-29 PROCEDURE — 2500000003 HC RX 250 WO HCPCS: Performed by: STUDENT IN AN ORGANIZED HEALTH CARE EDUCATION/TRAINING PROGRAM

## 2025-07-29 PROCEDURE — 99233 SBSQ HOSP IP/OBS HIGH 50: CPT | Performed by: INTERNAL MEDICINE

## 2025-07-29 RX ORDER — FUROSEMIDE 40 MG/1
40 TABLET ORAL DAILY
Qty: 60 TABLET | Refills: 0
Start: 2025-07-29

## 2025-07-29 RX ADMIN — TIOTROPIUM BROMIDE AND OLODATEROL 2 PUFF: 3.124; 2.736 SPRAY, METERED RESPIRATORY (INHALATION) at 08:19

## 2025-07-29 RX ADMIN — SENNOSIDES AND DOCUSATE SODIUM 1 TABLET: 50; 8.6 TABLET ORAL at 09:42

## 2025-07-29 RX ADMIN — SODIUM CHLORIDE, PRESERVATIVE FREE 10 ML: 5 INJECTION INTRAVENOUS at 09:51

## 2025-07-29 RX ADMIN — TAMSULOSIN HYDROCHLORIDE 0.4 MG: 0.4 CAPSULE ORAL at 09:41

## 2025-07-29 RX ADMIN — TIMOLOL MALEATE 1 DROP: 5 SOLUTION OPHTHALMIC at 09:50

## 2025-07-29 RX ADMIN — FAMOTIDINE 20 MG: 20 TABLET, FILM COATED ORAL at 09:41

## 2025-07-29 RX ADMIN — AMLODIPINE BESYLATE 2.5 MG: 2.5 TABLET ORAL at 09:43

## 2025-07-29 RX ADMIN — ATORVASTATIN CALCIUM 10 MG: 10 TABLET, FILM COATED ORAL at 09:42

## 2025-07-29 RX ADMIN — Medication 400 MG: at 09:43

## 2025-07-29 RX ADMIN — PANTOPRAZOLE SODIUM 40 MG: 40 TABLET, DELAYED RELEASE ORAL at 05:59

## 2025-07-29 NOTE — CARE COORDINATION
Dr Eugene updated writer, pt likely ready for discharge today pending Cardio.  Primary RN messaged with Cardio, plan for discharge today with monitor.  Writer met with pt and Coco/daughter at bedside, confirmed discharge plan for home without skilled home care.  Coco plans to be with pt this week, as she is already off work, and then next week pt will resume nonskilled home health aide services.  Coco verbalized understanding of plan for event monitor, is looking forward to it being put on pt and having instructions.  Coco asked about VA resources for HHA, pt has not been established with OP VA services.  Writer provided VA information, how to apply for aide and attendance vs community health services.  Coco agrees with discharge plan, denies any other needs.  Pt and Coco appreciative of information provided.  JERRY Jaramillo-RN

## 2025-07-29 NOTE — PLAN OF CARE
Problem: Safety - Adult  Goal: Free from fall injury  Outcome: Progressing   Pt will remain free from falls throughout hospital stay. Fall precautions in place, bed alarm on, bed in lowest position with wheels locked and side rails 2/4 up. Room door open and hourly rounding completed. Will continue to monitor throughout shift.     Problem: ABCDS Injury Assessment  Goal: Absence of physical injury  Outcome: Progressing     Problem: Skin/Tissue Integrity  Goal: Skin integrity remains intact  Description: 1.  Monitor for areas of redness and/or skin breakdown  2.  Assess vascular access sites hourly  3.  Every 4-6 hours minimum:  Change oxygen saturation probe site  4.  Every 4-6 hours:  If on nasal continuous positive airway pressure, respiratory therapy assess nares and determine need for appliance change or resting period  Outcome: Progressing

## 2025-07-29 NOTE — PROGRESS NOTES
[facility] Medicine Progress Note  V 7.24      Date of Admission: 7/27/2025    Hospital Day: 2      Chief Admission Complaint:  Palpitations lightheadedness weakness     Subjective:  resting in bed, no complaints at this time, daughter at bedside,  had long discussion about code status    Presenting Admission History:         89 y.o. male who presented to [facility] with palpitations lightheadedness weakness.  Intermittent symptoms times months, progressively worsened.now acutely worsened x 1 week.  Patient denies history of AF arrhythmia MI CAD CHF stroke.  He is compliant with medications and follow-up.  Ambulates independently with walker.  Daughter is POA she is his main caretaker.  Daughter at bedside during my evaluation she is at bedside.  No chest pain syncope loss of consciousness head injury bleeding sx. he is not on anticoagulation.  He reports doing well overall until recently.  Denies recent illness or obvious precipitating event.  No home O2 use or CPAP at baseline.  Daughter reports + occasional mild confusion at baseline.  She has been meaning to get them checked out for this.  We discussed code status. DNRCCA-DNI.  No chest compressions or intubation in setting of arrest.  PMHx significant for HTN HLD.         Assessment/Plan:        Acutely worsened palpitations lightheadedness, weakness, intermittent x months, unclear etiology put possibly from cardiac source  - pt/ot eval ordered  -tx underlying issues  -echo pending    Irregular ht rhythm- concerns for Atrial fibrillation, new diagnosis,tele showed possible intermittent AF and bradycardia down to mid 40s, patient was sleeping, asymptomatic.  No overt confusion in hospital. [id number] score >2.  Discussed anticoagulation at least during initial management and patient and daughter were in agreement with plan.  CTH negative bleed acute abnormalities.   - held AC due to bradycardia and pending cardiology evaluation for 
4 Eyes Skin Assessment     The patient is being assess for  Admission    I agree that 2 RN's have performed a thorough Head to Toe Skin Assessment on the patient. ALL assessment sites listed below have been assessed.       Areas assessed by both nurses: Merlene RN and Zuri RN  [x]   Head, Face, and Ears   [x]   Shoulders, Back, and Chest  [x]   Arms, Elbows, and Hands   [x]   Coccyx, Sacrum, and Ischum  [x]   Legs, Feet, and Heels        Does the Patient have Skin Breakdown?  No         Louie Prevention initiated:  Yes   Wound Care Orders initiated:  No      [facility] nurse consulted for Pressure Injury (Stage 3,4, Unstageable, DTI, NWPT, and Complex wounds):  No      Nurse 1 eSignature: Electronically signed by Merlene Platt RN on 7/28/25 at 12:49 AM EDT    **SHARE this note so that the co-signing nurse is able to place an eSignature**    Nurse 2 eSignature: Electronically signed by Zuri Dexter RN on 7/28/25 at 1:18 AM EDT            
Hospitalist paged with the following message from cardiology \"Ok thank you Estephania! Seems like he felt well during the walk, heart rate did rise, which is good to see, let’s let him go home with heart monitor, and outpatient follow up \"  
Occupational Therapy  Facility/Department: [facility] A2 CARD TELEMETRY  Occupational Therapy Initial Assessment and Treatment Note     Name: Andreas Stoll  : 10/29/1925  MRN: [id number]  Date of Service: 2025    Discharge Recommendations:  24 hour supervision or assist      Patient Diagnosis(es): The primary encounter diagnosis was New onset atrial fibrillation (HCC). Diagnoses of Generalized weakness, Heart palpitations, and Atrial fibrillation, unspecified type (HCC) were also pertinent to this visit.  Past Medical History:  has a past medical history of Cancer (HCC), GERD (gastroesophageal reflux disease), Glaucoma, History of stomach ulcers, Hyperlipidemia, Hypertension, Nausea alone, Pneumonia, Psychiatric problem, S/P radiation therapy, Seasonal allergies, and Staphylococcal arthritis of left knee (HCC).  Past Surgical History:  has a past surgical history that includes Appendectomy; Cholecystectomy; skin biopsy; other surgical history (2012); Colonoscopy (10/20/1999); Colonoscopy (2002); Upper gastrointestinal endoscopy (2004); Upper gastrointestinal endoscopy (2007); Prostate biopsy (); Upper gastrointestinal endoscopy (2013); Cataract removal with implant (Right, 2016); Cataract removal with implant (Left, 2016); other surgical history (Left, 2017); Wrist surgery (Right, 2017); knee surgery (Right, 2017); eye surgery; and Endoscopy, colon, diagnostic.     Assessment  Performance deficits / Impairments: Decreased ADL status;Decreased functional mobility ;Decreased endurance;Decreased balance  Assessment: Pt seen for OT eval and tx after admission for new onset of a fib.  Pt lives alone in an accessible home and had been independent with BADLs.  He ambulates with RW.  During OT, pt required min A for toilet transfer for sit to stand and min A to manage clothing. Per daughter, pt has DME for toilet and shower at home. He was SBA to don 
Patient admitted to room 216 from ED.  Patient oriented to room, bed rails, phone, lights and bathroom.  Patient instructed about the schedule of the day including: vital sign frequency, lab draws, possible tests, frequency of MD and staff rounds, including RN/MD rounding together at bedside, daily weights, and I &O's.  Patient instructed about prescribed diet, how to use call light, and television.  Bed alarm in place, patient aware of placement and reason. Telemetry box 98 in place, patient aware of placement and reason.  Bed locked, in lowest position, side rails up 2/4, call light within reach.  Will continue to monitor.     
Physical Therapy  Facility/Department: [facility] A2 CARD TELEMETRY  Physical Therapy Initial Assessment/Treatment     Name: Andreas Stoll  : 10/29/1925  MRN: [id number]  Date of Service: 2025    Discharge Recommendations:  24 hour supervision or assist, Home with Home health PT   PT Equipment Recommendations  Equipment Needed: No      Patient Diagnosis(es): The primary encounter diagnosis was New onset atrial fibrillation (HCC). Diagnoses of Generalized weakness, Heart palpitations, Atrial fibrillation, unspecified type (HCC), and Nonrheumatic aortic valve stenosis were also pertinent to this visit.  Past Medical History:  has a past medical history of Cancer (HCC), GERD (gastroesophageal reflux disease), Glaucoma, History of stomach ulcers, Hyperlipidemia, Hypertension, Nausea alone, Pneumonia, Psychiatric problem, S/P radiation therapy, Seasonal allergies, and Staphylococcal arthritis of left knee (HCC).  Past Surgical History:  has a past surgical history that includes Appendectomy; Cholecystectomy; skin biopsy; other surgical history (2012); Colonoscopy (10/20/1999); Colonoscopy (2002); Upper gastrointestinal endoscopy (2004); Upper gastrointestinal endoscopy (2007); Prostate biopsy (); Upper gastrointestinal endoscopy (2013); Cataract removal with implant (Right, 2016); Cataract removal with implant (Left, 2016); other surgical history (Left, 2017); Wrist surgery (Right, 2017); knee surgery (Right, 2017); eye surgery; and Endoscopy, colon, diagnostic.    Assessment  Body Structures, Functions, Activity Limitations Requiring Skilled Therapeutic Intervention: Decreased functional mobility ;Decreased strength;Decreased balance;Decreased endurance;Decreased posture  Assessment: Pt to [facility] with diagnosis of palpitations. PTA pt lives alone in a single story house with a ramped entrance. Pt was independent with transfers and ambulation with a RW. Pt 
Pt was walked per Cardiology to assess HR as the pt ambulates.   Pts HR at the start of exam while resting in bed 42.   Pts HR sitting at the edge of the bed 51   Pts HR standing 58   Walked with pt down the morris and back HR laxmi to the 60s and eventually peaked at 82 by the end of the walk. The pt then sat down and maybe not even 30 seconds upon resting his HR got back down to 57.      paged with the following information  
Pts PIVs removed, TELE removed and placed in the dirty bin. All questions answered at this time. DC reviewed with the pt and his daughter.   
develop    Hyperlipidemia  Hypertension  History of cancer status post radiation therapy  History of peptic ulcer disease      If the patient feels well with walking the morris with nursing today, may be discharged with cardiac monitor in place and appointments on the books with general cardiology and EP.      52 minutes spent in history, physical exam, review of studies today including echocardiogram, discussion with the patient and daughter, formulation of treatment plan and coordination of care as well as documentation.  High MDM    Patient Active Problem List   Diagnosis    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 (HCC)    Near syncope    Sinus bradycardia    Nausea    Weight loss    EKG abnormalities    HTN (hypertension)    Hiatal hernia    PUD (peptic ulcer disease)    Localized osteoarthrosis, lower leg    Knee pain, bilateral    Primary osteoarthritis of both knees    Anterior tibialis tendinitis    Staphylococcal arthritis of multiple sites (HCC)    Swelling of knee joint    Acute arthritis    Crystal arthropathy    Calcium pyrophosphate arthropathy of multiple sites    Cellulitis of left lower extremity    Thrombophlebitis arm    Left arm cellulitis    Palpitations    PVC's (premature ventricular contractions)    SOB (shortness of breath)    Syncope and collapse    Hyponatremia    Hypokalemia    Generalized weakness    Nonrheumatic aortic valve stenosis    PAT (paroxysmal atrial tachycardia)    Bradycardia    Heart palpitations         I will address the patient's cardiac risk factors and adjusted pharmacologic treatment as needed. In addition, I have reinforced the need for patient directed risk factor modification.  All questions and concerns were addressed to the patient/family. Alternatives to my treatment were discussed.     Thank you for allowing us to participate in the care of Andreas Stoll. Please call me with any questions [phone].    Ken Ospina MD, [facility]  Cardiovascular Disease  [facility]

## 2025-07-29 NOTE — TELEPHONE ENCOUNTER
Monitor placed by [facility] in hospital  Monitor company   Length of monitor 14 days  Monitor ordered by [facility]  Device ID [id number]  Patch ID #[id number] #[id number]  Activation successful prior to pt leaving office? Yes

## 2025-07-29 NOTE — DISCHARGE SUMMARY
and pt did well/was stable    Severe AS- noted on echo  -cards on board, pt is not a candidate for structural intervention/TAVR due to age/frailty  -daughter educated on keeping precise BP control(not too low and not too high)    Irregular ht rhythm- concerns for Atrial fibrillation, new diagnosis,tele showed possible intermittent AF and bradycardia down to mid 40s, patient was sleeping, asymptomatic.  No overt confusion in hospital. [id number] score >2.  Discussed anticoagulation at least during initial management and patient and daughter were in agreement with plan.  CTH negative bleed acute abnormalities.   - held AC due to bradycardia and pending cardiology evaluation for possible pacemaker  -cards consulted, felt pt to have sinus ashley with freq PACs, runs of AT  -tele  -TTE ordered(see above)  -planned for 1 month heart monitor     Bradycardia, new diagnosis  -tele  -cards consulted, no need for pacer at this time   -monitor arranged on dc    HTN- stable  -on norvasc     HLD- on statin       Physical Exam Performed:      BP (!) 112/57   Pulse (!) 49   Temp 97.7 °F (36.5 °C) (Oral)   Resp 16   Ht 1.778 m (5' 10\")   Wt 67 kg (147 lb 11.3 oz)   SpO2 97%   BMI 21.19 kg/m²     General appearance:  No apparent distress, appears stated age and cooperative.  Respiratory:  Normal respiratory effort.   Cardiovascular:  Regular rate and rhythm.  Abdomen:  Soft, non-tender, non-distended.  Musculoskeletal:  No edema  Neurologic:  Non-focal  Psychiatric:  Alert and oriented    Patient Discharge Instructions:      Follow up:    1.  Primary Care Provider Marv Norman MD in the next 1-2 weeks.  2. Cardiology as outpatient    The patient was seen and examined on day of discharge and this discharge summary is in conjunction with any daily progress note from day of discharge. Time spent on discharge: 32 minutes in the examination, evaluation, counseling and review of medications and discharge

## 2025-07-30 LAB — TSH SERPL DL<=0.005 MIU/L-ACNC: 1.05 UIU/ML (ref 0.27–4.2)

## 2025-08-20 LAB — ECHO BSA: 1.8 M2
